# Patient Record
Sex: FEMALE
[De-identification: names, ages, dates, MRNs, and addresses within clinical notes are randomized per-mention and may not be internally consistent; named-entity substitution may affect disease eponyms.]

---

## 2017-02-23 ENCOUNTER — HOSPITAL ENCOUNTER (INPATIENT)
Dept: HOSPITAL 5 - ED | Age: 67
LOS: 2 days | Discharge: HOME | DRG: 312 | End: 2017-02-25
Attending: INTERNAL MEDICINE | Admitting: INTERNAL MEDICINE
Payer: COMMERCIAL

## 2017-02-23 DIAGNOSIS — E66.9: ICD-10-CM

## 2017-02-23 DIAGNOSIS — Z82.49: ICD-10-CM

## 2017-02-23 DIAGNOSIS — K52.9: ICD-10-CM

## 2017-02-23 DIAGNOSIS — J44.9: ICD-10-CM

## 2017-02-23 DIAGNOSIS — Z80.3: ICD-10-CM

## 2017-02-23 DIAGNOSIS — E78.5: ICD-10-CM

## 2017-02-23 DIAGNOSIS — I11.0: ICD-10-CM

## 2017-02-23 DIAGNOSIS — I50.9: ICD-10-CM

## 2017-02-23 DIAGNOSIS — Z87.891: ICD-10-CM

## 2017-02-23 DIAGNOSIS — R55: Primary | ICD-10-CM

## 2017-02-23 LAB
ALBUMIN SERPL-MCNC: 4.4 G/DL (ref 3.9–5)
ALBUMIN/GLOB SERPL: 1.4 %
ALP SERPL-CCNC: 151 UNITS/L (ref 35–129)
ALT SERPL-CCNC: 18 UNITS/L (ref 7–56)
ANION GAP SERPL CALC-SCNC: 18 MMOL/L
BACTERIA #/AREA URNS HPF: (no result) /HPF
BILIRUB SERPL-MCNC: 0.6 MG/DL (ref 0.1–1.2)
BILIRUB UR QL STRIP: (no result)
BLASTOCYTES % (MANUAL): 0 %
BLOOD UR QL VISUAL: (no result)
BUN SERPL-MCNC: 12 MG/DL (ref 7–17)
BUN/CREAT SERPL: 13.33 %
CALCIUM SERPL-MCNC: 8.9 MG/DL (ref 8.4–10.2)
CHLORIDE SERPL-SCNC: 97.1 MMOL/L (ref 98–107)
CHOLEST SERPL-MCNC: 224 MG/DL (ref 50–199)
CK SERPL-CCNC: 73 UNITS/L (ref 30–135)
CO2 SERPL-SCNC: 27 MMOL/L (ref 22–30)
GLUCOSE SERPL-MCNC: 147 MG/DL (ref 65–100)
HCT VFR BLD CALC: 45.7 % (ref 30.3–42.9)
HDLC SERPL-MCNC: 48 MG/DL (ref 40–59)
HGB BLD-MCNC: 15.2 GM/DL (ref 10.1–14.3)
INR PPP: 0.94 (ref 0.87–1.13)
KETONES UR STRIP-MCNC: (no result) MG/DL
LEUKOCYTE ESTERASE UR QL STRIP: (no result)
MAGNESIUM SERPL-MCNC: 3 MG/DL (ref 1.7–2.3)
MCH RBC QN AUTO: 29 PG (ref 28–32)
MCHC RBC AUTO-ENTMCNC: 33 % (ref 30–34)
MCV RBC AUTO: 86 FL (ref 79–97)
NITRITE UR QL STRIP: (no result)
PH UR STRIP: 7 [PH] (ref 5–7)
PLATELET # BLD: 169 K/MM3 (ref 140–440)
POTASSIUM SERPL-SCNC: 4 MMOL/L (ref 3.6–5)
PROT SERPL-MCNC: 7.5 G/DL (ref 6.3–8.2)
PROT UR STRIP-MCNC: (no result) MG/DL
RBC # BLD AUTO: 5.32 M/MM3 (ref 3.65–5.03)
RBC #/AREA URNS HPF: 1 /HPF (ref 0–6)
SODIUM SERPL-SCNC: 138 MMOL/L (ref 137–145)
TOTAL CELLS COUNTED PERCENT: 2
TRIGL SERPL-MCNC: 143 MG/DL (ref 2–149)
UROBILINOGEN UR-MCNC: < 2 MG/DL (ref ?–2)
WBC # BLD AUTO: 7.7 K/MM3 (ref 4.5–11)
WBC #/AREA URNS HPF: < 1 /HPF (ref 0–6)

## 2017-02-23 PROCEDURE — 84443 ASSAY THYROID STIM HORMONE: CPT

## 2017-02-23 PROCEDURE — 71010: CPT

## 2017-02-23 PROCEDURE — 70450 CT HEAD/BRAIN W/O DYE: CPT

## 2017-02-23 PROCEDURE — 93017 CV STRESS TEST TRACING ONLY: CPT

## 2017-02-23 PROCEDURE — 93306 TTE W/DOPPLER COMPLETE: CPT

## 2017-02-23 PROCEDURE — 80061 LIPID PANEL: CPT

## 2017-02-23 PROCEDURE — 80053 COMPREHEN METABOLIC PANEL: CPT

## 2017-02-23 PROCEDURE — A9502 TC99M TETROFOSMIN: HCPCS

## 2017-02-23 PROCEDURE — 36415 COLL VENOUS BLD VENIPUNCTURE: CPT

## 2017-02-23 PROCEDURE — 83880 ASSAY OF NATRIURETIC PEPTIDE: CPT

## 2017-02-23 PROCEDURE — 85007 BL SMEAR W/DIFF WBC COUNT: CPT

## 2017-02-23 PROCEDURE — 82550 ASSAY OF CK (CPK): CPT

## 2017-02-23 PROCEDURE — 83735 ASSAY OF MAGNESIUM: CPT

## 2017-02-23 PROCEDURE — 85379 FIBRIN DEGRADATION QUANT: CPT

## 2017-02-23 PROCEDURE — 71275 CT ANGIOGRAPHY CHEST: CPT

## 2017-02-23 PROCEDURE — 78452 HT MUSCLE IMAGE SPECT MULT: CPT

## 2017-02-23 PROCEDURE — 93970 EXTREMITY STUDY: CPT

## 2017-02-23 PROCEDURE — 85025 COMPLETE CBC W/AUTO DIFF WBC: CPT

## 2017-02-23 PROCEDURE — 93010 ELECTROCARDIOGRAM REPORT: CPT

## 2017-02-23 PROCEDURE — 84484 ASSAY OF TROPONIN QUANT: CPT

## 2017-02-23 PROCEDURE — 81001 URINALYSIS AUTO W/SCOPE: CPT

## 2017-02-23 PROCEDURE — 82553 CREATINE MB FRACTION: CPT

## 2017-02-23 PROCEDURE — 85610 PROTHROMBIN TIME: CPT

## 2017-02-23 PROCEDURE — 93005 ELECTROCARDIOGRAM TRACING: CPT

## 2017-02-23 PROCEDURE — 93880 EXTRACRANIAL BILAT STUDY: CPT

## 2017-02-23 RX ADMIN — FAMOTIDINE SCH MG: 20 TABLET ORAL at 21:11

## 2017-02-23 RX ADMIN — SODIUM CHLORIDE SCH MLS/HR: 0.9 INJECTION, SOLUTION INTRAVENOUS at 21:53

## 2017-02-23 RX ADMIN — ENOXAPARIN SODIUM SCH MG: 100 INJECTION SUBCUTANEOUS at 21:11

## 2017-02-23 NOTE — EMERGENCY DEPARTMENT REPORT
ED General Adult HPI





- General


Chief complaint: Dizziness


Stated complaint: DIZZY


Time Seen by Provider: 17 12:21


Source: patient, EMS (ems notes not available at time of  chart dictation), RN 

notes reviewed


Mode of arrival: Stretcher


Limitations: No Limitations, Physical Limitation





- History of Present Illness


Initial comments: 





This is a 66-year-old female.  She is previously unknown to me.  Her primary 

care doctor is Dr. Hale past medical history includes hypertension, thyroid 

disease, peripheral edema.





Patient presents to the ER with atraumatic left lower extremity pain and 

swelling.





Patient also complains of chest pain last week which since resolved, and 

episode of syncope today.  She also complains of dizziness.  Dizziness is 

described as a sensation of room spinning.  It is constant.





Patient reports that she did not fall or hit her head, she reports that 

somebody caught her.  There is no focal extremity weakness.  There is no focal 

extremity numbness.  Positive mild cough, mild shortness of breath, mild 

wheezing.  Symptoms have been constant.  Worse with physical exertion, they 

decrease with rest.


























-: Gradual


Location: chest, left, lower extremity


Severity scale (0 -10): 0


Quality: aching


Consistency: intermittent


Improves with: rest


Worsens with: movement


Associated Symptoms: chest pain, cough, loss of appetite, shortness of breath, 

syncope, weakness





- Related Data


 Home Medications











 Medication  Instructions  Recorded  Confirmed  Last Taken


 


Unobtainable  17 Unknown











 Allergies











Allergy/AdvReac Type Severity Reaction Status Date / Time


 


No Known Allergies Allergy   Verified 17 13:05














ED Review of Systems


ROS: 


Stated complaint: DIZZY


Other details as noted in HPI





Constitutional: malaise, weakness


Eyes: denies: vision change


ENT: denies: epistaxis


Respiratory: shortness of breath, wheezing


Cardiovascular: chest pain, syncope


Gastrointestinal: denies: abdominal pain


Genitourinary: as per HPI, dysuria


Skin: denies: lesions


Neurological: weakness


Psychiatric: anxiety





ED Past Medical Hx





- Past Medical History


Hx Hypertension: Yes


Hx Congestive Heart Failure: Yes


Hx COPD: Yes





- Surgical History


Past Surgical History?: No





- Social History


Smoking Status: Former Smoker


Substance Use Type: None





- Medications


Home Medications: 


 Home Medications











 Medication  Instructions  Recorded  Confirmed  Last Taken  Type


 


Unobtainable  17 Unknown History














ED Physical Exam





- General


Limitations: Physical Limitation


General appearance: alert, in distress





- Eye


Eye exam: Present: normal appearance, PERRL, EOMI.  Absent: nystagmus





- ENT


ENT exam: Present: normal exam, normal orophraynx, normal external ear exam





- Neck


Neck exam: Present: normal inspection, full ROM.  Absent: tenderness, 

meningismus





- Respiratory


Respiratory exam: Present: wheezes, rhonchi





- Cardiovascular


Cardiovascular Exam: Present: regular rate, normal rhythm, normal heart sounds.

  Absent: bradycardia, tachycardia, irregular rhythm, systolic murmur, 

diastolic murmur, rubs, gallop





- GI/Abdominal


GI/Abdominal exam: Present: soft, normal bowel sounds.  Absent: distended, 

tenderness, guarding, rebound, rigid, pulsatile mass





- Extremities Exam


Extremities exam: Present: full ROM, tenderness, normal capillary refill, pedal 

edema, calf tenderness





- Back Exam


Back exam: Present: normal inspection, full ROM.  Absent: tenderness, CVA 

tenderness (R), CVA tenderness (L), muscle spasm, paraspinal tenderness, 

vertebral tenderness





- Neurological Exam


Neurological exam: Present: alert, oriented X3, other (Extraocular movements 

intact.  Tongue midline.  No facial droop.  Facial sensation intact to light 

touch in the V1, V2, V3 distribution bilaterally.  5 and 5 strength in 4 

extremities..  Sensation is intact to light touch in 4 extremities.).  Absent: 

motor sensory deficit





- Psychiatric


Psychiatric exam: Present: anxious





ED Course


 Vital Signs











  17





  11:43 11:45 16:33


 


Temperature 98 F  


 


Pulse Rate 67  67


 


Respiratory 16 18 16





Rate   


 


Blood Pressure 156/82  139/89





[Left]   


 


O2 Sat by Pulse 95 95 97





Oximetry   














- Reevaluation(s)


Reevaluation #1: 





17 15:00








Differential diagnosis: DVT, pulmonary embolus, acute coronary syndrome, COPD 

exacerbation, pneumonia, peripheral vertigo, subacute stroke





Assessment and plan: 66-year-old female with left lower extremity pain and 

swelling, chest pain, syncope, peripheral vertigo.  Has a GCS of 15, NIH score 

is 0.  No obvious cranial nerve deficits.  Vertigo and symptoms have been 

present for greater than 4.5 hours, therefore not a TPA candidate.  Left lower 

extremity DVT study was negative, CT  of  the chest was negative.  Patient was 

wheezing, was given albuterol, Atrovent, steroids by EMS.  Patient will be 

admitted to the hospital for syncope, chest pain, shortness of breath.  


 case is discussed with the Hospital physician, Dr. Martínez, who accepts the 

patient to his service.





CT scan of the head is negative








17 17:18








ED Medical Decision Making





- Lab Data


Result diagrams: 


 17 12:50





 17 12:50








 Vital Signs











  17





  11:43 11:45


 


Temperature 98 F 


 


Pulse Rate 67 


 


Respiratory 16 18





Rate  


 


Blood Pressure 156/82 





[Left]  


 


O2 Sat by Pulse 95 95





Oximetry  














 Lab Results











  17 Range/Units





  12:50 12:50 12:50 


 


WBC  7.7    (4.5-11.0)  K/mm3


 


RBC  5.32 H    (3.65-5.03)  M/mm3


 


Hgb  15.2 H    (10.1-14.3)  gm/dl


 


Hct  45.7 H    (30.3-42.9)  %


 


MCV  86    (79-97)  fl


 


MCH  29    (28-32)  pg


 


MCHC  33    (30-34)  %


 


RDW  13.9    (13.2-15.2)  %


 


Plt Count  169    (140-440)  K/mm3


 


Add Manual Diff  Complete    


 


Total Counted  100    


 


Seg Neutrophils %  Np    


 


Seg Neuts % (Manual)  87.0 H    (40.0-70.0)  %


 


Band Neutrophils %  8.0    %


 


Lymphocytes % (Manual)  3.0 L    (13.4-35.0)  %


 


Reactive Lymphs % (Man)  0    %


 


Monocytes % (Manual)  1.0    (0.0-7.3)  %


 


Eosinophils % (Manual)  1.0    (0.0-4.3)  %


 


Basophils % (Manual)  0    (0.0-1.8)  %


 


Metamyelocytes %  0    %


 


Myelocytes %  0    %


 


Promyelocytes %  0    %


 


Blast Cells %  0    %


 


Nucleated RBC %  Not Reportable    


 


Seg Neutrophils # Man  6.7    (1.8-7.7)  K/mm3


 


Band Neutrophils #  0.6    K/mm3


 


Lymphocytes # (Manual)  0.2 L    (1.2-5.4)  K/mm3


 


Abs React Lymphs (Man)  0.0    K/mm3


 


Monocytes # (Manual)  0.1    (0.0-0.8)  K/mm3


 


Eosinophils # (Manual)  0.1    (0.0-0.4)  K/mm3


 


Basophils # (Manual)  0.0    (0.0-0.1)  K/mm3


 


Metamyelocytes #  0.0    K/mm3


 


Myelocytes #  0.0    K/mm3


 


Promyelocytes #  0.0    K/mm3


 


Blast Cells #  0.0    K/mm3


 


WBC Morphology  Not Reportable    


 


Hypersegmented Neuts  Not Reportable    


 


Hyposegmented Neuts  Not Reportable    


 


Hypogranular Neuts  Not Reportable    


 


Smudge Cells  Not Reportable    


 


Toxic Granulation  Not Reportable    


 


Toxic Vacuolation  Not Reportable    


 


Dohle Bodies  Not Reportable    


 


Pelger-Huet Anomaly  Not Reportable    


 


Chanelle Rods  Not Reportable    


 


Platelet Estimate  Appears decreased    


 


Clumped Platelets  Not Reportable    


 


Plt Clumps, EDTA  Not Reportable    


 


Large Platelets  Not Reportable    


 


Giant Platelets  Not Reportable    


 


Platelet Satelliting  Not Reportable    


 


Plt Morphology Comment  Not Reportable    


 


RBC Morphology  Normal    


 


Dimorphic RBCs  Not Reportable    


 


Polychromasia  Not Reportable    


 


Hypochromasia  Not Reportable    


 


Poikilocytosis  Not Reportable    


 


Anisocytosis  Not Reportable    


 


Microcytosis  Not Reportable    


 


Macrocytosis  Not Reportable    


 


Spherocytes  Not Reportable    


 


Pappenheimer Bodies  Not Reportable    


 


Sickle Cells  Not Reportable    


 


Target Cells  Not Reportable    


 


Tear Drop Cells  Not Reportable    


 


Ovalocytes  Not Reportable    


 


Helmet Cells  Not Reportable    


 


[place] Bodies  Not Reportable    


 


Cabot Rings  Not Reportable    


 


[place] Cells  Not Reportable    


 


Bite Cells  Not Reportable    


 


Crenated Cell  Not Reportable    


 


Elliptocytes  Not Reportable    


 


Acanthocytes (Spur)  Not Reportable    


 


Rouleaux  Not Reportable    


 


Hemoglobin C Crystals  Not Reportable    


 


Schistocytes  Not Reportable    


 


Malaria parasites  Not Reportable    


 


Charly Bodies  Not Reportable    


 


Hem Pathologist Commnt  No    


 


PT   12.5   (12.2-14.9)  Sec.


 


INR   0.94   (0.87-1.13)  


 


D-Dimer   282.05 H   (0-234)  ng/mlDDU


 


Sodium    138  (137-145)  mmol/L


 


Potassium    4.0  (3.6-5.0)  mmol/L


 


Chloride    97.1 L  ()  mmol/L


 


Carbon Dioxide    27  (22-30)  mmol/L


 


Anion Gap    18  mmol/L


 


BUN    12  (7-17)  mg/dL


 


Creatinine    0.9  (0.7-1.2)  mg/dL


 


Estimated GFR    > 60  ml/min


 


BUN/Creatinine Ratio    13.33  %


 


Glucose    147 H  ()  mg/dL


 


Calcium    8.9  (8.4-10.2)  mg/dL


 


Magnesium    3.0 H  (1.7-2.3)  mg/dL


 


Total Bilirubin    0.6  (0.1-1.2)  mg/dL


 


AST    16  (5-40)  units/L


 


ALT    18  (7-56)  units/L


 


Alkaline Phosphatase    151 H  ()  units/L


 


Troponin T    < 0.010  (0.00-0.029)  ng/mL


 


NT-Pro-B Natriuret Pep     (0-900)  pg/mL


 


Total Protein    7.5  (6.3-8.2)  g/dL


 


Albumin    4.4  (3.9-5)  g/dL


 


Albumin/Globulin Ratio    1.4  %


 


TSH     (0.270-4.200)  mlU/mL


 


Urine Color     (Yellow)  


 


Urine Turbidity     (Clear)  


 


Urine pH     (5.0-7.0)  


 


Ur Specific Gravity     (1.003-1.030)  


 


Urine Protein     (Negative)  mg/dL


 


Urine Glucose (UA)     (Negative)  mg/dL


 


Urine Ketones     (Negative)  mg/dL


 


Urine Blood     (Negative)  


 


Urine Nitrite     (Negative)  


 


Urine Bilirubin     (Negative)  


 


Urine Urobilinogen     (<2.0)  mg/dL


 


Ur Leukocyte Esterase     (Negative)  


 


Urine WBC (Auto)     (0.0-6.0)  /HPF


 


Urine RBC (Auto)     (0.0-6.0)  /HPF


 


U Epithel Cells (Auto)     (0-13.0)  /HPF


 


Urine Bacteria (Auto)     (Negative)  /HPF














  17 Range/Units





  12:50 12:50 13:11 


 


WBC     (4.5-11.0)  K/mm3


 


RBC     (3.65-5.03)  M/mm3


 


Hgb     (10.1-14.3)  gm/dl


 


Hct     (30.3-42.9)  %


 


MCV     (79-97)  fl


 


MCH     (28-32)  pg


 


MCHC     (30-34)  %


 


RDW     (13.2-15.2)  %


 


Plt Count     (140-440)  K/mm3


 


Add Manual Diff     


 


Total Counted     


 


Seg Neutrophils %     


 


Seg Neuts % (Manual)     (40.0-70.0)  %


 


Band Neutrophils %     %


 


Lymphocytes % (Manual)     (13.4-35.0)  %


 


Reactive Lymphs % (Man)     %


 


Monocytes % (Manual)     (0.0-7.3)  %


 


Eosinophils % (Manual)     (0.0-4.3)  %


 


Basophils % (Manual)     (0.0-1.8)  %


 


Metamyelocytes %     %


 


Myelocytes %     %


 


Promyelocytes %     %


 


Blast Cells %     %


 


Nucleated RBC %     


 


Seg Neutrophils # Man     (1.8-7.7)  K/mm3


 


Band Neutrophils #     K/mm3


 


Lymphocytes # (Manual)     (1.2-5.4)  K/mm3


 


Abs React Lymphs (Man)     K/mm3


 


Monocytes # (Manual)     (0.0-0.8)  K/mm3


 


Eosinophils # (Manual)     (0.0-0.4)  K/mm3


 


Basophils # (Manual)     (0.0-0.1)  K/mm3


 


Metamyelocytes #     K/mm3


 


Myelocytes #     K/mm3


 


Promyelocytes #     K/mm3


 


Blast Cells #     K/mm3


 


WBC Morphology     


 


Hypersegmented Neuts     


 


Hyposegmented Neuts     


 


Hypogranular Neuts     


 


Smudge Cells     


 


Toxic Granulation     


 


Toxic Vacuolation     


 


Dohle Bodies     


 


Pelger-Huet Anomaly     


 


Chanelle Rods     


 


Platelet Estimate     


 


Clumped Platelets     


 


Plt Clumps, EDTA     


 


Large Platelets     


 


Giant Platelets     


 


Platelet Satelliting     


 


Plt Morphology Comment     


 


RBC Morphology     


 


Dimorphic RBCs     


 


Polychromasia     


 


Hypochromasia     


 


Poikilocytosis     


 


Anisocytosis     


 


Microcytosis     


 


Macrocytosis     


 


Spherocytes     


 


Pappenheimer Bodies     


 


Sickle Cells     


 


Target Cells     


 


Tear Drop Cells     


 


Ovalocytes     


 


Helmet Cells     


 


Graves-[place]ac [place] Bodies     


 


Cabot Rings     


 


[place] Cells     


 


Bite Cells     


 


Crenated Cell     


 


Elliptocytes     


 


Acanthocytes (Spur)     


 


Rouleaux     


 


Hemoglobin C Crystals     


 


Schistocytes     


 


Malaria parasites     


 


Charly Bodies     


 


Hem Pathologist Commnt     


 


PT     (12.2-14.9)  Sec.


 


INR     (0.87-1.13)  


 


D-Dimer     (0-234)  ng/mlDDU


 


Sodium     (137-145)  mmol/L


 


Potassium     (3.6-5.0)  mmol/L


 


Chloride     ()  mmol/L


 


Carbon Dioxide     (22-30)  mmol/L


 


Anion Gap     mmol/L


 


BUN     (7-17)  mg/dL


 


Creatinine     (0.7-1.2)  mg/dL


 


Estimated GFR     ml/min


 


BUN/Creatinine Ratio     %


 


Glucose     ()  mg/dL


 


Calcium     (8.4-10.2)  mg/dL


 


Magnesium     (1.7-2.3)  mg/dL


 


Total Bilirubin     (0.1-1.2)  mg/dL


 


AST     (5-40)  units/L


 


ALT     (7-56)  units/L


 


Alkaline Phosphatase     ()  units/L


 


Troponin T     (0.00-0.029)  ng/mL


 


NT-Pro-B Natriuret Pep  422.9    (0-900)  pg/mL


 


Total Protein     (6.3-8.2)  g/dL


 


Albumin     (3.9-5)  g/dL


 


Albumin/Globulin Ratio     %


 


TSH   2.310   (0.270-4.200)  mlU/mL


 


Urine Color    Straw  (Yellow)  


 


Urine Turbidity    Clear  (Clear)  


 


Urine pH    7.0  (5.0-7.0)  


 


Ur Specific Gravity    1.006  (1.003-1.030)  


 


Urine Protein    <15 mg/dl  (Negative)  mg/dL


 


Urine Glucose (UA)    Neg  (Negative)  mg/dL


 


Urine Ketones    Neg  (Negative)  mg/dL


 


Urine Blood    Neg  (Negative)  


 


Urine Nitrite    Neg  (Negative)  


 


Urine Bilirubin    Neg  (Negative)  


 


Urine Urobilinogen    < 2.0  (<2.0)  mg/dL


 


Ur Leukocyte Esterase    Neg  (Negative)  


 


Urine WBC (Auto)    < 1.0  (0.0-6.0)  /HPF


 


Urine RBC (Auto)    1.0  (0.0-6.0)  /HPF


 


U Epithel Cells (Auto)    < 1.0  (0-13.0)  /HPF


 


Urine Bacteria (Auto)    1+  (Negative)  /HPF

















- EKG Data


When compared to previous EKG there are: previous EKG unavailable





17 15:01 normal sinus, 79 bpm, left axis deviation, premature ventricular 

contractions, low voltage, abnormal EKG, not morphologically consistent with 

STEMI.




















- Radiology Data


Radiology results: report reviewed, image reviewed








   [facility]   [facility] : 1950  MedRec# [id number]





17 13:38 - Radiology Dept. Note by ES BEASLEY


   [facility] Num: [id number]  : 1950  Patient Age: 66





VASCULAR LAB.PRELIMINARY REPORT.BLE VENOUS DUPLEX DONE.NO EVIDENCE OF DVT/SVT 

IN VESSELS VISUALIZED.





Initialized on 17 13:38 - END OF NOTE














CT angiogram of the chest negative for acute disease.





Chest x-ray negative, chronic findings noted.








Critical care attestation.: 


If time is entered above; I have spent that time in minutes in the direct care 

of this critically ill patient, excluding procedure time.








ED Disposition


Clinical Impression: 


 Syncope, Chest pain, Dizziness





Disposition: OP ADMITTED AS IP TO THIS HOSP


Is pt being admited?: Yes


Condition: Good

## 2017-02-23 NOTE — CAT SCAN REPORT
CT HEAD WITHOUT CONTRAST:



HISTORY:  Syncope.



Serial contiguous axial images were obtained through the cranium.

Intravenous contrast material was not administered.  The ventricles are 

normal in size and appearance.  There is no mass effect or midline 

shift.  No areas of abnormally increased or decreased attenuation are 

seen.  Minimal nonspecific chronic white matter changes are noted in 

both frontal lobes. No mass lesion is seen.



The mastoid air cells and visualized portions of the sinuses are

normal.



IMPRESSION:

Cranial CT scan within normal limits.

## 2017-02-23 NOTE — ADMIT CRITERIA FORM
Admission Criteria Documentation: 





                                                CHEST PAIN





Clinical Indications for Admission to Inpatient Care





                                                                         (Place 

'X' for any and all applicable criteria): 





Admission is indicated for chest pain and ANY ONE of the following(1)(2)(3)(4)(5

): 





[ ]I.    Angina with acute coronary syndrome (Also use Myocardial Infarction or 

Angina guideline)


[ ]II.   Hemodynamic instability


[ ]III.  Angina needing acute intervention as indicated by ALL of the following(

11)(12):


        [ ]a)  Unstable angina is present as indicated by angina that is ANY ONE

 of the following:


                [ ]i)     New onset   


                [ ]ii)    Nocturnal   


                [ ]iii)   Prolonged at rest   


                [ ]iv)   Progressive


        [ ]b)  Angina warrants acute intervention as indicated by ANY ONE of 

the following:


                [ ]i)     Recurrent angina (e.g, not responding as previously 

to treatment)


                [ ]ii)     Angina at rest or with low-level activities despite 

initial medical therapy


                [ ]iii)    New or presumably new ST-segment depression on ECG


                [ ]iv)    Signs or symptoms of heart failure (eg, dyspnea, 

pulmonary edema)


                [ ]v)     New or worsening mitral regurgitation


                [ ]vi)    Hemodynamic instability


                [ ]vii)   Dangerous arrhythmia (eg, sustained ventricular 

tachycardia)          


                [ ]viii)   History of percutaneous coronary intervention within 

6 months          


                [ ]ix)    History of coronary artery bypass graft surgery


                [ ]x)    PANCHITO risk score of 2 or greater[A]


                [ ]xi)    History of Diabetes(14)


                [ ]xii)   High-risk cardiac ischemia findings on noninvasive 

testing (e.g, echocardiogram,


                          treadmill testing, nuclear scan)


                [ ]xiii)  Chronic renal insufficiency (ie, estimated GFR less 

than 60 mL/min/1.732m)


                [ ]xiv)  Left ventricular ejection fraction less than 40%


              


[ ]IV.   Evidence of MI (eg, cardiac biomarkers positive, ST-segment elevation 

on ECG) also use Myocardial Infarction Criteria Form.


[ ]V.    Pulmonary edema 


[ ]VI.   Respiratory distress


[ ]VII.  Chest pain indicative of serious diagnosis other than coronary artery 

disease (eg, aortic dissection)





[ ]VIII. Contraindications and/or Inappropriate clinical situations for 

Observational Care in patients


          with Chest Pain, when ANY ONE of the following is required:


          [ ]a)   Patient with risk factor for pulmonary embolism, acute 

coronary syndrome and myocardial infarction (18)


          [ ]b)   Patient with Pulmonary embolism require an average LOS of 4.3 

days, therefore emergency 


                   department observation management is inappropriate 18,23


          [ ]c)   Painful condition/s in the elderly, have the highest rate of 

recidivism after emergency department observation management (10.8%)  20,21,22


          [ ]d)   Elevated cardiac biomarker requires intensive and exhaustive 

care (19)


[X ]IX.  General contraindications and/or Inappropriate clinical situations for 

Observational Care in patients


          with Chest Pain, when ANY ONE of the following is required:


           [X ]a)   Prediction of prolongation of LOS based on ANY ONE of the 

following may be considered as 


                    a contraindication for observational care 2, 3, 4, 5, 6, 7, 

8, 9, 10, 11


                    [ ]i)    Age > 65 yrs.


                    [X ]ii)   Patient arriving by ambulance


                    [ ]iii)  Patient with high acuity


                    [ ]iv)  Patient requiring vital sign monitoring


                    [ ]v)   Patient on IV medication


           [ ]b)   Systolic blood pressures  180mmHg  3,12


           [ ]c)   Patient with altered mental status including delirium and 

other alteration of consciousness, (3)


           [ ]d)   Patient whose discharge disposition will be to a skilled 

nursing home or rehabilitation home should 


                    not be managed in Emergency Department Observation Unit. 

CMS rule requires 3 days hospital stay before such placement. 3,13


           [ ]e)   Patient with failure to thrive due to broad array of 

etiologies  3,16,17


           [ ]f)    Inability to ambulate  3,14








Extended stay beyond goal length of stay may be needed for (1)(28):


[ ]a)   Specific condition diagnosed after evaluation (eg, pulmonary embolism, 

aortic dissection) 


[ ]b)   Unstable angina


[ ]c)   Continued suspicion of acute coronary syndrome with inability to 

complete needed cardiac evaluation


         (eg, patient clinically unable to undergo stress testing)


[ ]d)   Myocardial infarction








(Contents from ANGINA and CHEST PAIN clinical indications for admission to 

inpatient care have been integrated in this form) 








The original [facility] content created by [facility] has been revised. 


The portions of the content which have been revised are identified through the 

use of italic text or in bold, and [facility] [employer]


has neither reviewed nor approved the modified material. All other unmodified 

content is copyright  [facility].





Please see references footnoted in the original [facility] edition 

2016





Admission Criteria Met: Yes

## 2017-02-23 NOTE — CAT SCAN REPORT
CT angiography of the chest with 3-D reconstructed images.



Findings: There is no evidence of pulmonary emboli. The lungs are 

clear. Mild to moderate emphysematous changes are noted especially in 

the upper lobes. An isolated bleb is seen in the right upper lobe 

measuring 1.9 cm in diameter. There is no pleural fluid.



The mediastinum and hilar structures are normal.



Impression: No evidence of pulmonary emboli. COPD is present.

## 2017-02-23 NOTE — XRAY REPORT
AP CHEST:



HISTORY: Syncope



AP view of the chest demonstrates a normal mediastinal and

cardiac contour with clear lungs and normal bony and soft tissue

structures.



IMPRESSION:

Unremarkable AP chest.

## 2017-02-23 NOTE — VASCULAR LAB REPORT
LOWER EXTREMITY VENOUS DUPLEX:



REASON FOR EXAM: Pain and swelling of the lower extremities.



COMMENTS ON THE RIGHT:

All veins visualized are freely compressible without evidence of

internal echogenicity.  Flow is spontaneous and phasic throughout.



COMMENTS ON THE LEFT:

All veins visualized are freely compressible without evidence of

internal echogenicity.  Flow is spontaneous and phasic throughout.



IMPRESSION:

No evidence of acute or chronic deep venous thrombosis in either

lower extremity.

## 2017-02-23 NOTE — HISTORY AND PHYSICAL REPORT
History of Present Illness


Date of examination: 02/23/17


Date of admission: 


02/23/17 15:02





Chief complaint: 





Syncope


History of present illness: 





This is a  67 y/o  female with h/o COPD, HTN  presented with an episode of 

syncope this morning. She also c/o diarrhea for last couple days. patient 

states that she has been feeling lightheaded for last couple weeks when she 

gets up from sitting and walks aaround, symptom was off and on. today ricky 

were having furniture delivery to their house and she was standing near the 

door when she felt lightheaded, passed out, slided along the wall and fell on 

the floor. She denies any head injury. She states that lasy couple days she was 

having loose stool, without ant rectal bleeding. She c/o nausea but no 

vomiting. She also reported in the ER that she had left leg pain and swelling. 

CT head in the ER was unremarkable. She also also evaluated by doppler US of 

the LLE and CT with PE protocol was normal. She is getting admitted for further 

evaluation of syncope.





Past medical History: h/o COPD, HTN





Past surgical History: None





Social History: Lives with family, denies any smoking, drinking and elicit drug 

abuse. Quit smoking on 2012.





Family History: Significant for HD in parents, and breast cancer in mother.





Review of System:


Constitutional: no fever, no chills, no weight loss, + lightheadedness


Ears, eyes, nose, mouth and throat: no nasal congestion, no nasal discharge, no 

sinus pressure, no vision change, no red eye. + left earache


Neck: No neck pain or rigidity.


Cardiovascular: No chest pain, no orthopnea, no palpitations, no leg swelling


Respiratory: No shortness of breath, no cough, no congestion, no wheezing


Gastrointestinal: no abdominal pain, + nausea, no vomiting, + diarrhea


Genitourinary : no dysuria, no hematuria


Musculoskeletal: no joint swelling or muscle ache 


Integumentary: no rash, no pruritis


Neurological: no parathesias, no numbness, no tingling


Endocrine: no cold or heat intolerance, no polyuria or polydipsia


Hematologic/Lymphatic: no easy bruising, no easy bleeding, no gland swelling


Allergic/Immunologic: no urticaria, no angioedema.





Medications and Allergies


 Allergies











Allergy/AdvReac Type Severity Reaction Status Date / Time


 


No Known Allergies Allergy   Verified 02/23/17 13:05











 Home Medications











 Medication  Instructions  Recorded  Confirmed  Last Taken  Type


 


ALBUTEROL Inhaler [ProAir HFA 2 puff IH QID PRN 02/23/17 02/23/17 02/23/17 

History





Inhaler]     


 


Atenolol [Tenormin] 100 mg PO DAILY 02/23/17 02/23/17 02/23/17 History


 


Furosemide [Lasix TAB] 40 mg PO QDAY 02/23/17 02/23/17 02/23/17 History


 


Lisinopril [Zestril TAB] 40 mg PO QDAY 02/23/17 02/23/17 02/23/17 History


 


Omeprazole 20 mg PO DAILY 02/23/17 02/23/17 02/23/17 History


 


Potassium Chloride [K-Dur] 10 meq PO QDAY 02/23/17 02/23/17 02/23/17 History


 


Umeclidinium Brm/Vilanterol Tr 1 puff PO DAILY 02/23/17 02/23/17 02/23/17 

History





[Anoro Ellipta 62.5-25 Mcg INH]     


 


amLODIPine [Norvasc] 10 mg PO DAILY 02/23/17 02/23/17 02/23/17 History














Exam





- Physical Exam


Narrative exam: 





GENERAL: This is well-developed obese WF lying on bed appeared to be in no 

discomfort. 


HEENT: Normocephalic.  Atraumatic.  Extraocular motions are intact. No 

conjunctival congestion or icterus. Patient has moist mucous membranes. 

External auditory canal and nares patent bilaterally.


NECK: Supple.  Trachea midline. No JVD, thyromagaly or lymphadenopathy.


CHEST/LUNGS: Clear to auscultated bilaterally.  There is no respiratory 

distress noted, breathing nonlabored. No wheezes crackles or rhonchi.


HEART/CARDIOVASCULAR: Regular in rate and rhythm.  PMI at the apex.  There is 

no gallop rub or murmur.


ABDOMEN: Abdomen is soft, nontender.  Patient has normal bowel sounds.  There 

is no abdominal distention. No organomagaly or rigidity.


SKIN: There is no rash,  no erythrema.  There is no diaphoresis. Warm and dry.


NEUROLOGY: The patient is awake, alert, and oriented.  The patient is 

cooperative.   The patient has normal speech. No focal motor deficit.


MUSCULOSKELETAL: No joint effusion or tenderness. Muscle strength equal 

bilaterally. No muscle wasting. 


EXTRIMITY: No edema, cyanosis or clubbing.


PSYCH: No depression or anxiety noted. Cooperative.





- Constitutional


Vitals: 


 











Temp Pulse Resp BP Pulse Ox


 


 98 F   67   16   139/89   97 


 


 02/23/17 11:43  02/23/17 16:33  02/23/17 16:33  02/23/17 16:33  02/23/17 16:33














Results





- Labs


CBC & Chem 7: 


 02/23/17 12:50





 02/23/17 12:50


Labs: 


 Laboratory Last Values











WBC  7.7 K/mm3 (4.5-11.0)   02/23/17  12:50    


 


RBC  5.32 M/mm3 (3.65-5.03)  H  02/23/17  12:50    


 


Hgb  15.2 gm/dl (10.1-14.3)  H  02/23/17  12:50    


 


Hct  45.7 % (30.3-42.9)  H  02/23/17  12:50    


 


MCV  86 fl (79-97)   02/23/17  12:50    


 


MCH  29 pg (28-32)   02/23/17  12:50    


 


MCHC  33 % (30-34)   02/23/17  12:50    


 


RDW  13.9 % (13.2-15.2)   02/23/17  12:50    


 


Plt Count  169 K/mm3 (140-440)   02/23/17  12:50    


 


Add Manual Diff  Complete   02/23/17  12:50    


 


Total Counted  100   02/23/17  12:50    


 


Seg Neutrophils %  Np   02/23/17  12:50    


 


Seg Neuts % (Manual)  87.0 % (40.0-70.0)  H  02/23/17  12:50    


 


Band Neutrophils %  8.0 %  02/23/17  12:50    


 


Lymphocytes % (Manual)  3.0 % (13.4-35.0)  L  02/23/17  12:50    


 


Reactive Lymphs % (Man)  0 %  02/23/17  12:50    


 


Monocytes % (Manual)  1.0 % (0.0-7.3)   02/23/17  12:50    


 


Eosinophils % (Manual)  1.0 % (0.0-4.3)   02/23/17  12:50    


 


Basophils % (Manual)  0 % (0.0-1.8)   02/23/17  12:50    


 


Metamyelocytes %  0 %  02/23/17  12:50    


 


Myelocytes %  0 %  02/23/17  12:50    


 


Promyelocytes %  0 %  02/23/17  12:50    


 


Blast Cells %  0 %  02/23/17  12:50    


 


Nucleated RBC %  Not Reportable   02/23/17  12:50    


 


Seg Neutrophils # Man  6.7 K/mm3 (1.8-7.7)   02/23/17  12:50    


 


Band Neutrophils #  0.6 K/mm3  02/23/17  12:50    


 


Lymphocytes # (Manual)  0.2 K/mm3 (1.2-5.4)  L  02/23/17  12:50    


 


Abs React Lymphs (Man)  0.0 K/mm3  02/23/17  12:50    


 


Monocytes # (Manual)  0.1 K/mm3 (0.0-0.8)   02/23/17  12:50    


 


Eosinophils # (Manual)  0.1 K/mm3 (0.0-0.4)   02/23/17  12:50    


 


Basophils # (Manual)  0.0 K/mm3 (0.0-0.1)   02/23/17  12:50    


 


Metamyelocytes #  0.0 K/mm3  02/23/17  12:50    


 


Myelocytes #  0.0 K/mm3  02/23/17  12:50    


 


Promyelocytes #  0.0 K/mm3  02/23/17  12:50    


 


Blast Cells #  0.0 K/mm3  02/23/17  12:50    


 


WBC Morphology  Not Reportable   02/23/17  12:50    


 


Hypersegmented Neuts  Not Reportable   02/23/17  12:50    


 


Hyposegmented Neuts  Not Reportable   02/23/17  12:50    


 


Hypogranular Neuts  Not Reportable   02/23/17  12:50    


 


Smudge Cells  Not Reportable   02/23/17  12:50    


 


Toxic Granulation  Not Reportable   02/23/17  12:50    


 


Toxic Vacuolation  Not Reportable   02/23/17  12:50    


 


Dohle Bodies  Not Reportable   02/23/17  12:50    


 


Pelger-Huet Anomaly  Not Reportable   02/23/17  12:50    


 


Chanelle Rods  Not Reportable   02/23/17  12:50    


 


Platelet Estimate  Appears decreased   02/23/17  12:50    


 


Clumped Platelets  Not Reportable   02/23/17  12:50    


 


Plt Clumps, EDTA  Not Reportable   02/23/17  12:50    


 


Large Platelets  Not Reportable   02/23/17  12:50    


 


Giant Platelets  Not Reportable   02/23/17  12:50    


 


Platelet Satelliting  Not Reportable   02/23/17  12:50    


 


Plt Morphology Comment  Not Reportable   02/23/17  12:50    


 


RBC Morphology  Normal   02/23/17  12:50    


 


Dimorphic RBCs  Not Reportable   02/23/17  12:50    


 


Polychromasia  Not Reportable   02/23/17  12:50    


 


Hypochromasia  Not Reportable   02/23/17  12:50    


 


Poikilocytosis  Not Reportable   02/23/17  12:50    


 


Anisocytosis  Not Reportable   02/23/17  12:50    


 


Microcytosis  Not Reportable   02/23/17  12:50    


 


Macrocytosis  Not Reportable   02/23/17  12:50    


 


Spherocytes  Not Reportable   02/23/17  12:50    


 


Pappenheimer Bodies  Not Reportable   02/23/17  12:50    


 


Sickle Cells  Not Reportable   02/23/17  12:50    


 


Target Cells  Not Reportable   02/23/17  12:50    


 


Tear Drop Cells  Not Reportable   02/23/17  12:50    


 


Ovalocytes  Not Reportable   02/23/17  12:50    


 


Helmet Cells  Not Reportable   02/23/17  12:50    


 


[place]ves-[place] Bodies  Not Reportable   02/23/17  12:50    


 


Cabot Rings  Not Reportable   02/23/17  12:50    


 


Brad Cells  Not Reportable   02/23/17  12:50    


 


Bite Cells  Not Reportable   02/23/17  12:50    


 


Crenated Cell  Not Reportable   02/23/17  12:50    


 


Elliptocytes  Not Reportable   02/23/17  12:50    


 


Acanthocytes (Spur)  Not Reportable   02/23/17  12:50    


 


Rouleaux  Not Reportable   02/23/17  12:50    


 


Hemoglobin C Crystals  Not Reportable   02/23/17  12:50    


 


Schistocytes  Not Reportable   02/23/17  12:50    


 


Malaria parasites  Not Reportable   02/23/17  12:50    


 


Charly Bodies  Not Reportable   02/23/17  12:50    


 


Hem Pathologist Commnt  No   02/23/17  12:50    


 


PT  12.5 Sec. (12.2-14.9)   02/23/17  12:50    


 


INR  0.94  (0.87-1.13)   02/23/17  12:50    


 


D-Dimer  282.05 ng/mlDDU (0-234)  H  02/23/17  12:50    


 


Sodium  138 mmol/L (137-145)   02/23/17  12:50    


 


Potassium  4.0 mmol/L (3.6-5.0)   02/23/17  12:50    


 


Chloride  97.1 mmol/L ()  L  02/23/17  12:50    


 


Carbon Dioxide  27 mmol/L (22-30)   02/23/17  12:50    


 


Anion Gap  18 mmol/L  02/23/17  12:50    


 


BUN  12 mg/dL (7-17)   02/23/17  12:50    


 


Creatinine  0.9 mg/dL (0.7-1.2)   02/23/17  12:50    


 


Estimated GFR  > 60 ml/min  02/23/17  12:50    


 


BUN/Creatinine Ratio  13.33 %  02/23/17  12:50    


 


Glucose  147 mg/dL ()  H  02/23/17  12:50    


 


Calcium  8.9 mg/dL (8.4-10.2)   02/23/17  12:50    


 


Magnesium  3.0 mg/dL (1.7-2.3)  H  02/23/17  12:50    


 


Total Bilirubin  0.6 mg/dL (0.1-1.2)   02/23/17  12:50    


 


AST  16 units/L (5-40)   02/23/17  12:50    


 


ALT  18 units/L (7-56)   02/23/17  12:50    


 


Alkaline Phosphatase  151 units/L ()  H  02/23/17  12:50    


 


Troponin T  < 0.010 ng/mL (0.00-0.029)   02/23/17  12:50    


 


NT-Pro-B Natriuret Pep  422.9 pg/mL (0-900)   02/23/17  12:50    


 


Total Protein  7.5 g/dL (6.3-8.2)   02/23/17  12:50    


 


Albumin  4.4 g/dL (3.9-5)   02/23/17  12:50    


 


Albumin/Globulin Ratio  1.4 %  02/23/17  12:50    


 


TSH  2.310 mlU/mL (0.270-4.200)   02/23/17  12:50    


 


Urine Color  Straw  (Yellow)   02/23/17  13:11    


 


Urine Turbidity  Clear  (Clear)   02/23/17  13:11    


 


Urine pH  7.0  (5.0-7.0)   02/23/17  13:11    


 


Ur Specific Gravity  1.006  (1.003-1.030)   02/23/17  13:11    


 


Urine Protein  <15 mg/dl mg/dL (Negative)   02/23/17  13:11    


 


Urine Glucose (UA)  Neg mg/dL (Negative)   02/23/17  13:11    


 


Urine Ketones  Neg mg/dL (Negative)   02/23/17  13:11    


 


Urine Blood  Neg  (Negative)   02/23/17  13:11    


 


Urine Nitrite  Neg  (Negative)   02/23/17  13:11    


 


Urine Bilirubin  Neg  (Negative)   02/23/17  13:11    


 


Urine Urobilinogen  < 2.0 mg/dL (<2.0)   02/23/17  13:11    


 


Ur Leukocyte Esterase  Neg  (Negative)   02/23/17  13:11    


 


Urine WBC (Auto)  < 1.0 /HPF (0.0-6.0)   02/23/17  13:11    


 


Urine RBC (Auto)  1.0 /HPF (0.0-6.0)   02/23/17  13:11    


 


U Epithel Cells (Auto)  < 1.0 /HPF (0-13.0)   02/23/17  13:11    


 


Urine Bacteria (Auto)  1+ /HPF (Negative)   02/23/17  13:11    














- Imaging and Cardiology


CT scan - chest: report reviewed (negative for PE)


CT Scan - head: report reviewed (no acute intracranial process)


Venous US: report reviewed (lower extremity venous Doppler was negative for DVT)





Assessment and Plan


Assessment and plan: 





Syncope, likely vasovagal


H/o COPD not in exacerbation


HTN, benign essential


Obesity due to access calorie


Acute diarrhea, likely gastroenteritis








Plan:


Patient will be admitted to medicine


Monitor serial cardiac enzyme and EKG


IV fluid, stool study


Obtain 2-D echocardiogram and carotid Doppler


Cardiology consult


Supportive care, hold BP meds, orthostatic vitals


Provide GI and dvt prophylaxis











Advance Directives: Yes


VTE prophylaxis?: Chemical


Plan of care discussed with patient/family: Yes

## 2017-02-24 LAB — CK SERPL-CCNC: 82 UNITS/L (ref 30–135)

## 2017-02-24 RX ADMIN — SODIUM CHLORIDE SCH MLS/HR: 0.9 INJECTION, SOLUTION INTRAVENOUS at 10:47

## 2017-02-24 RX ADMIN — FAMOTIDINE SCH MG: 20 TABLET ORAL at 10:47

## 2017-02-24 RX ADMIN — ASPIRIN SCH MG: 325 TABLET, COATED ORAL at 10:47

## 2017-02-24 RX ADMIN — FAMOTIDINE SCH MG: 20 TABLET ORAL at 21:34

## 2017-02-24 RX ADMIN — SODIUM CHLORIDE SCH MLS/HR: 0.9 INJECTION, SOLUTION INTRAVENOUS at 21:35

## 2017-02-24 RX ADMIN — ENOXAPARIN SODIUM SCH MG: 100 INJECTION SUBCUTANEOUS at 21:34

## 2017-02-24 NOTE — PROGRESS NOTE
Assessment and Plan


Assessment and plan: 





Syncope, likely vasovagal


H/o COPD not in exacerbation


HTN, benign essential


Obesity due to access calorie


Acute diarrhea, likely gastroenteritis








Plan:


serial cardiac enzyme and EKG normal


IV fluid, stool study pending


follow 2-D echocardiogram report 


carotid Doppler showed <50% stenosis b/l


Cardiology recommended stress test in the am


Supportive care, hold BP meds, orthostatic vitals


Provide GI and dvt prophylaxis














History


Interval history: 





Pt seen and examined


denies any lightheadedness today, no chest pain, diarrhea resolved








Hospitalist Physical





- Physical exam


Narrative exam: 





GENERAL: This is well-developed obese WF lying on bed appeared to be in no 

discomfort. 


HEENT: Normocephalic. No conjunctival congestion or icterus. Patient has moist 

mucous membranes. External auditory canal and nares patent bilaterally.


NECK: Supple.  Trachea midline. 


CHEST/LUNGS: Clear to auscultated bilaterally.  breathing nonlabored. 


HEART/CARDIOVASCULAR: Regular in rate and rhythm.  PMI at the apex.  .


ABDOMEN: Abdomen is soft, nontender.  Patient has normal bowel sounds. 


SKIN: There is no rash, Warm and dry.


NEUROLOGY: The patient is awake, alert, and oriented.  No focal motor deficit.


MUSCULOSKELETAL: No joint effusion or tenderness. 


EXTRIMITY: No edema, cyanosis or clubbing.


PSYCH: No depression or anxiety noted. Cooperative.





- Constitutional


Vitals: 


 











Temp Pulse Resp BP Pulse Ox


 


 98.2 F   69   18   115/59   95 


 


 02/24/17 20:41  02/24/17 20:41  02/24/17 20:59  02/24/17 20:41  02/24/17 20:41











General appearance: Present: no acute distress, obese





Results





- Labs


CBC & Chem 7: 


 02/23/17 12:50





 02/23/17 12:50


Labs: 


 Laboratory Last Values











WBC  7.7 K/mm3 (4.5-11.0)   02/23/17  12:50    


 


RBC  5.32 M/mm3 (3.65-5.03)  H  02/23/17  12:50    


 


Hgb  15.2 gm/dl (10.1-14.3)  H  02/23/17  12:50    


 


Hct  45.7 % (30.3-42.9)  H  02/23/17  12:50    


 


MCV  86 fl (79-97)   02/23/17  12:50    


 


MCH  29 pg (28-32)   02/23/17  12:50    


 


MCHC  33 % (30-34)   02/23/17  12:50    


 


RDW  13.9 % (13.2-15.2)   02/23/17  12:50    


 


Plt Count  169 K/mm3 (140-440)   02/23/17  12:50    


 


Add Manual Diff  Complete   02/23/17  12:50    


 


Total Counted  100   02/23/17  12:50    


 


Seg Neutrophils %  Np   02/23/17  12:50    


 


Seg Neuts % (Manual)  87.0 % (40.0-70.0)  H  02/23/17  12:50    


 


Band Neutrophils %  8.0 %  02/23/17  12:50    


 


Lymphocytes % (Manual)  3.0 % (13.4-35.0)  L  02/23/17  12:50    


 


Reactive Lymphs % (Man)  0 %  02/23/17  12:50    


 


Monocytes % (Manual)  1.0 % (0.0-7.3)   02/23/17  12:50    


 


Eosinophils % (Manual)  1.0 % (0.0-4.3)   02/23/17  12:50    


 


Basophils % (Manual)  0 % (0.0-1.8)   02/23/17  12:50    


 


Metamyelocytes %  0 %  02/23/17  12:50    


 


Myelocytes %  0 %  02/23/17  12:50    


 


Promyelocytes %  0 %  02/23/17  12:50    


 


Blast Cells %  0 %  02/23/17  12:50    


 


Nucleated RBC %  Not Reportable   02/23/17  12:50    


 


Seg Neutrophils # Man  6.7 K/mm3 (1.8-7.7)   02/23/17  12:50    


 


Band Neutrophils #  0.6 K/mm3  02/23/17  12:50    


 


Lymphocytes # (Manual)  0.2 K/mm3 (1.2-5.4)  L  02/23/17  12:50    


 


Abs React Lymphs (Man)  0.0 K/mm3  02/23/17  12:50    


 


Monocytes # (Manual)  0.1 K/mm3 (0.0-0.8)   02/23/17  12:50    


 


Eosinophils # (Manual)  0.1 K/mm3 (0.0-0.4)   02/23/17  12:50    


 


Basophils # (Manual)  0.0 K/mm3 (0.0-0.1)   02/23/17  12:50    


 


Metamyelocytes #  0.0 K/mm3  02/23/17  12:50    


 


Myelocytes #  0.0 K/mm3  02/23/17  12:50    


 


Promyelocytes #  0.0 K/mm3  02/23/17  12:50    


 


Blast Cells #  0.0 K/mm3  02/23/17  12:50    


 


WBC Morphology  Not Reportable   02/23/17  12:50    


 


Hypersegmented Neuts  Not Reportable   02/23/17  12:50    


 


Hyposegmented Neuts  Not Reportable   02/23/17  12:50    


 


Hypogranular Neuts  Not Reportable   02/23/17  12:50    


 


Smudge Cells  Not Reportable   02/23/17  12:50    


 


Toxic Granulation  Not Reportable   02/23/17  12:50    


 


Toxic Vacuolation  Not Reportable   02/23/17  12:50    


 


Dohle Bodies  Not Reportable   02/23/17  12:50    


 


Pelger-Huet Anomaly  Not Reportable   02/23/17  12:50    


 


Chanelle Rods  Not Reportable   02/23/17  12:50    


 


Platelet Estimate  Appears decreased   02/23/17  12:50    


 


Clumped Platelets  Not Reportable   02/23/17  12:50    


 


Plt Clumps, EDTA  Not Reportable   02/23/17  12:50    


 


Large Platelets  Not Reportable   02/23/17  12:50    


 


Giant Platelets  Not Reportable   02/23/17  12:50    


 


Platelet Satelliting  Not Reportable   02/23/17  12:50    


 


Plt Morphology Comment  Not Reportable   02/23/17  12:50    


 


RBC Morphology  Normal   02/23/17  12:50    


 


Dimorphic RBCs  Not Reportable   02/23/17  12:50    


 


Polychromasia  Not Reportable   02/23/17  12:50    


 


Hypochromasia  Not Reportable   02/23/17  12:50    


 


Poikilocytosis  Not Reportable   02/23/17  12:50    


 


Anisocytosis  Not Reportable   02/23/17  12:50    


 


Microcytosis  Not Reportable   02/23/17  12:50    


 


Macrocytosis  Not Reportable   02/23/17  12:50    


 


Spherocytes  Not Reportable   02/23/17  12:50    


 


Pappenheimer Bodies  Not Reportable   02/23/17  12:50    


 


Sickle Cells  Not Reportable   02/23/17  12:50    


 


Target Cells  Not Reportable   02/23/17  12:50    


 


Tear Drop Cells  Not Reportable   02/23/17  12:50    


 


Ovalocytes  Not Reportable   02/23/17  12:50    


 


Helmet Cells  Not Reportable   02/23/17  12:50    


 


[place] Bodies  Not Reportable   02/23/17  12:50    


 


Cabot Rings  Not Reportable   02/23/17  12:50    


 


[place] Cells  Not Reportable   02/23/17  12:50    


 


Bite Cells  Not Reportable   02/23/17  12:50    


 


Crenated Cell  Not Reportable   02/23/17  12:50    


 


Elliptocytes  Not Reportable   02/23/17  12:50    


 


Acanthocytes (Spur)  Not Reportable   02/23/17  12:50    


 


Rouleaux  Not Reportable   02/23/17  12:50    


 


Hemoglobin C Crystals  Not Reportable   02/23/17  12:50    


 


Schistocytes  Not Reportable   02/23/17  12:50    


 


Malaria parasites  Not Reportable   02/23/17  12:50    


 


Charly Bodies  Not Reportable   02/23/17  12:50    


 


Hem Pathologist Commnt  No   02/23/17  12:50    


 


PT  12.5 Sec. (12.2-14.9)   02/23/17  12:50    


 


INR  0.94  (0.87-1.13)   02/23/17  12:50    


 


D-Dimer  282.05 ng/mlDDU (0-234)  H  02/23/17  12:50    


 


Sodium  138 mmol/L (137-145)   02/23/17  12:50    


 


Potassium  4.0 mmol/L (3.6-5.0)   02/23/17  12:50    


 


Chloride  97.1 mmol/L ()  L  02/23/17  12:50    


 


Carbon Dioxide  27 mmol/L (22-30)   02/23/17  12:50    


 


Anion Gap  18 mmol/L  02/23/17  12:50    


 


BUN  12 mg/dL (7-17)   02/23/17  12:50    


 


Creatinine  0.9 mg/dL (0.7-1.2)   02/23/17  12:50    


 


Estimated GFR  > 60 ml/min  02/23/17  12:50    


 


BUN/Creatinine Ratio  13.33 %  02/23/17  12:50    


 


Glucose  147 mg/dL ()  H  02/23/17  12:50    


 


Calcium  8.9 mg/dL (8.4-10.2)   02/23/17  12:50    


 


Magnesium  3.0 mg/dL (1.7-2.3)  H  02/23/17  12:50    


 


Total Bilirubin  0.6 mg/dL (0.1-1.2)   02/23/17  12:50    


 


AST  16 units/L (5-40)   02/23/17  12:50    


 


ALT  18 units/L (7-56)   02/23/17  12:50    


 


Alkaline Phosphatase  151 units/L ()  H  02/23/17  12:50    


 


Total Creatine Kinase  82 units/L ()   02/23/17  23:33    


 


CK-MB (CK-2)  1.5 ng/mL (0.0-4.0)   02/23/17  20:38    


 


CK-MB (CK-2) Rel Index  1.7  (0-4)   02/23/17  23:33    


 


Troponin T  < 0.010 ng/mL (0.00-0.029)   02/23/17  23:33    


 


NT-Pro-B Natriuret Pep  422.9 pg/mL (0-900)   02/23/17  12:50    


 


Total Protein  7.5 g/dL (6.3-8.2)   02/23/17  12:50    


 


Albumin  4.4 g/dL (3.9-5)   02/23/17  12:50    


 


Albumin/Globulin Ratio  1.4 %  02/23/17  12:50    


 


Triglycerides  143 mg/dL (2-149)   02/23/17  19:56    


 


Cholesterol  224 mg/dL ()  H  02/23/17  19:56    


 


LDL Cholesterol Direct  148 mg/dL ()  H  02/23/17  19:56    


 


HDL Cholesterol  48 mg/dL (40-59)   02/23/17  19:56    


 


Cholesterol/HDL Ratio  4.66 %  02/23/17  19:56    


 


TSH  2.310 mlU/mL (0.270-4.200)   02/23/17  12:50    


 


Urine Color  Straw  (Yellow)   02/23/17  13:11    


 


Urine Turbidity  Clear  (Clear)   02/23/17  13:11    


 


Urine pH  7.0  (5.0-7.0)   02/23/17  13:11    


 


Ur Specific Gravity  1.006  (1.003-1.030)   02/23/17  13:11    


 


Urine Protein  <15 mg/dl mg/dL (Negative)   02/23/17  13:11    


 


Urine Glucose (UA)  Neg mg/dL (Negative)   02/23/17  13:11    


 


Urine Ketones  Neg mg/dL (Negative)   02/23/17  13:11    


 


Urine Blood  Neg  (Negative)   02/23/17  13:11    


 


Urine Nitrite  Neg  (Negative)   02/23/17  13:11    


 


Urine Bilirubin  Neg  (Negative)   02/23/17  13:11    


 


Urine Urobilinogen  < 2.0 mg/dL (<2.0)   02/23/17  13:11    


 


Ur Leukocyte Esterase  Neg  (Negative)   02/23/17  13:11    


 


Urine WBC (Auto)  < 1.0 /HPF (0.0-6.0)   02/23/17  13:11    


 


Urine RBC (Auto)  1.0 /HPF (0.0-6.0)   02/23/17  13:11    


 


U Epithel Cells (Auto)  < 1.0 /HPF (0-13.0)   02/23/17  13:11    


 


Urine Bacteria (Auto)  1+ /HPF (Negative)   02/23/17  13:11

## 2017-02-24 NOTE — CONSULTATION
History of Present Illness


Consult date: 02/24/17


Requesting physician: SANTANA NOEL


Consult reason: syncope


History of present illness: 


The patient is a 66 year old female with a history of hypertension, 

hyperlipidemia, COPD who presented following a syncopal episode at home. She 

states that recently her blood pressure has been running low and her PCP made 

some adjustments to her blood pressure medications last week. Since that time, 

she has experienced intermittent dizziness and lightheadness, especially when 

moving from a sitting to standing position. Yesterday, she was standing in the 

kitchen and as she was walking to answer the front door, she felt very 

lightheaded, leaned back against the wall and slid down to the floor. Her 

 reports that she was out for several minutes. She denies any chest pain

, palpitations, shortness of breath, nausea or vomiting. She does report 

several days of diarrhea earlier this week. Troponin negative x 3. Head CT 

unremarkable. Chest CTA negative for PE. Carotids negative. No recent ischemic 

evaluation. 





Past History


Past Medical History: COPD, hypertension, hyperlipidemia


Past Surgical History: No surgical history


Social history: .  denies: smoking, alcohol abuse, prescription drug 

abuse, IV drug use


Family history: CAD





Medications and Allergies


 Allergies











Allergy/AdvReac Type Severity Reaction Status Date / Time


 


No Known Allergies Allergy   Verified 02/23/17 13:05











 Home Medications











 Medication  Instructions  Recorded  Confirmed  Last Taken  Type


 


ALBUTEROL Inhaler [ProAir HFA 2 puff IH QID PRN 02/23/17 02/23/17 02/23/17 

History





Inhaler]     


 


Atenolol [Tenormin] 100 mg PO DAILY 02/23/17 02/23/17 02/23/17 History


 


Furosemide [Lasix TAB] 40 mg PO QDAY 02/23/17 02/23/17 02/23/17 History


 


Lisinopril [Zestril TAB] 40 mg PO QDAY 02/23/17 02/23/17 02/23/17 History


 


Omeprazole 20 mg PO DAILY 02/23/17 02/23/17 02/23/17 History


 


Potassium Chloride [K-Dur] 10 meq PO QDAY 02/23/17 02/23/17 02/23/17 History


 


Umeclidinium Brm/Vilanterol Tr 1 puff PO DAILY 02/23/17 02/23/17 02/23/17 

History





[Anoro Ellipta 62.5-25 Mcg INH]     


 


amLODIPine [Norvasc] 10 mg PO DAILY 02/23/17 02/23/17 02/23/17 History











Active Meds: 


Active Medications





Aspirin (Ecotrin)  325 mg PO QDAY [facility]


   Last Admin: 02/24/17 10:47 Dose:  325 mg


Enoxaparin Sodium (Lovenox)  40 mg SUB-Q QDAY@2200 [facility]


   Last Admin: 02/23/17 21:11 Dose:  40 mg


Famotidine (Pepcid)  20 mg PO BID [facility]


   Last Admin: 02/24/17 10:47 Dose:  20 mg


Sodium Chloride (Nacl 0.9% 1000 Ml)  1,000 mls @ 100 mls/hr IV AS DIRECT [facility]


   Last Admin: 02/24/17 10:47 Dose:  100 mls/hr


Morphine Sulfate (Morphine)  2 mg IV Q5MIN PRN


   PRN Reason: Chest Pain


Sodium Chloride (Sodium Chloride Flush Syringe 10 Ml)  10 ml IV PRN PRN


   PRN Reason: LINE FLUSH











Review of Systems


Constitutional: fatigue, no fever, no chills


Ears, nose, mouth and throat: no nasal congestion, no nasal discharge, no sinus 

pressure


Cardiovascular: syncope, leg edema, no chest pain, no palpitations, no 

shortness of breath


Respiratory: no cough, no shortness of breath, no congestion, no wheezing


Gastrointestinal: diarrhea, no abdominal pain, no nausea, no vomiting


Genitourinary Female: no dysuria, no urgency


Musculoskeletal: no neck stiffness, no neck pain, no myalgias


Integumentary: no rash, no pruritis


Neurological: syncope, no parathesias, no numbness, no tingling, no headaches


Endocrine: no cold intolerance, no heat intolerance


Hematologic/Lymphatic: no easy bruising, no easy bleeding


Allergic/Immunologic: no urticaria, no wheezing





Physical Examination


 Vital Signs











Temp Pulse Resp BP Pulse Ox


 


 98 F   67   16   156/82   95 


 


 02/23/17 11:43  02/23/17 11:43  02/23/17 11:43  02/23/17 11:43  02/23/17 11:43











General appearance: no acute distress, obese


HEENT: Positive: PERRL, Normocephaly, Mucus Membranes Moist


Neck: Positive: neck supple, trachea midline


Cardiac: Positive: Reg Rate and Rhythm, S1/S2


Lungs: Positive: clear to auscultation


Neuro: Positive: Grossly Intact


Abdomen: Positive: Soft, Active Bowel Sounds.  Negative: Tender


Skin: Positive: Clear.  Negative: Rash


Extremities: Present: +1 Edema (left lower leg)





Results





 02/23/17 12:50





 02/23/17 12:50


 Cardiac Enzymes











  02/23/17 Range/Units





  20:38 


 


CK-MB (CK-2)  1.5  (0.0-4.0)  ng/mL








 Lipids











  02/23/17 Range/Units





  19:56 


 


Triglycerides  143  (2-149)  mg/dL


 


Cholesterol  224 H  ()  mg/dL


 


HDL Cholesterol  48  (40-59)  mg/dL


 


Cholesterol/HDL Ratio  4.66  %














- Imaging and Cardiology


Echo: pending


EKG: image reviewed





EKG interpretations





- Telemetry


EKG Rhythm: Sinus Rhythm





- EKG


Sinus rhythms and dysrhythmias: sinus rhythm





Assessment and Plan


Syncope


   head CT negative


   chest CTA negative for PE


   carotids negative


   obtain orthostatics


   await echo findings


   Lexiscan thallium stress test in am


   will continue to observe on the monitor





Hypertension





Hyperlipidemia





COPD








Obtain orthostatics. Await echo findings. Lexiscan thallium stress test in am. 

Will continue to observe on the monitor. 





The patient has been seen in conjunction with Dr. West who agrees with the 

assessment and plan of care.





Thank you Dr. Noel for allowing us to participate in the care of this 

patient.

## 2017-02-25 VITALS — DIASTOLIC BLOOD PRESSURE: 81 MMHG | SYSTOLIC BLOOD PRESSURE: 164 MMHG

## 2017-02-25 RX ADMIN — ASPIRIN SCH MG: 325 TABLET, COATED ORAL at 12:54

## 2017-02-25 RX ADMIN — FAMOTIDINE SCH MG: 20 TABLET ORAL at 12:54

## 2017-02-25 NOTE — DISCHARGE SUMMARY
Providers





- Providers


Date of Admission: 


02/23/17 15:02





Date of discharge: 02/25/17


Attending physician: 


SANTANA NOEL





 





02/23/17


Consult to Cardiac Rehabilitation [CONS] Routine 


   Reason For Exam: Phase I





02/24/17 12:45


Consult to Physician [CONS] Routine 


   Consulting Provider: IFEANYI MENA


   Reason For Exam: syncope


   Place consult to:: cardiology on call


   Notified:: TED


   Was contact made?: Yes











Primary care physician: 


PRIMARY CARE MD








Hospitalization


Condition: Good


Hospital course: 





Discharge Diagnosis:


Syncope with prodrome, likley vasovagal:


   head CT negative


   chest CTA negative for PE


   carotids negative


   tte - nl lv fxn, no sig valvulopathy or pericardial effusion


   Lexiscan thallium stress test this reveals no ischemia/infarct and nl lv fxn


   tele unremarkable


H/o COPD not in exacerbation


HTN, benign essential


Obesity due to access calorie


Acute diarrhea, likely gastroenteritis, resolved











Disposition: DISCHARGED TO HOME OR SELFCARE


Time spent for discharge: 32 minutes





Core Measure Documentation





- Palliative Care


Palliative Care/ Comfort Measures: Not Applicable





- Core Measures


Any of the following diagnoses?: none





Exam





- Physical Exam


Narrative exam: 





GENERAL: This is well-developed obese WF lying on bed appeared to be in no 

discomfort. 


HEENT: Normocephalic. No conjunctival congestion or icterus. Patient has moist 

mucous membranes. External auditory canal and nares patent bilaterally.


NECK: Supple.  Trachea midline. 


CHEST/LUNGS: Clear to auscultated bilaterally.  breathing nonlabored. 


HEART/CARDIOVASCULAR: Regular in rate and rhythm.  PMI at the apex.  .


ABDOMEN: Abdomen is soft, nontender.  Patient has normal bowel sounds. 


SKIN: There is no rash, Warm and dry.


NEUROLOGY: The patient is awake, alert, and oriented.  No focal motor deficit.


MUSCULOSKELETAL: No joint effusion or tenderness. 


EXTRIMITY: No edema, cyanosis or clubbing.


PSYCH: No depression or anxiety noted. Cooperative.





- Constitutional


Vitals: 


 











Temp Pulse Resp BP Pulse Ox


 


 97.6 F   95 H  20   164/81   95 


 


 02/25/17 08:00  02/25/17 12:00  02/25/17 08:00  02/25/17 11:07  02/25/17 08:00














Plan


Activity: advance as tolerated, fall precautions


Weight Bearing Status: Non-Weight Bearing


Diet: low cholesterol, low salt


Follow up with: 


PRIMARY CARE,MD [Primary Care Provider] - 3-5 Days

## 2017-02-25 NOTE — PROGRESS NOTE
Assessment and Plan





67yo WF:





Syncope with prodrome:


   head CT negative


   chest CTA negative for PE


   carotids negative


   tte - nl lv fxn, no sig valvulopathy or pericardial effusion


   Lexiscan thallium stress test this reveals no ischemia/infarct and nl lv fxn


   tele unremarkable





Hypertension





Hyperlipidemia





COPD





stable cv status


f/u with us in office in 2 weeks








Subjective


Date of service: 02/25/17


Interval history: 


seen in stress lab 


feels well








Objective


 Vital Signs











  Temp Pulse Pulse Resp BP Pulse Ox


 


 02/25/17 08:00  97.6 F   67  20  144/75  95


 


 02/25/17 04:58  97.4 F L   65  20  108/60  96


 


 02/25/17 00:16  98.0 F   72  20  113/60  92


 


 02/25/17 00:00   80    


 


 02/24/17 20:59     18  


 


 02/24/17 20:41  98.2 F   69  18  115/59  95


 


 02/24/17 16:00  97.9 F   81  20  119/68  92


 


 02/24/17 12:15   86    


 


 02/24/17 12:00  98.0 F   78  18  118/59  93














- Physical Examination


HEENT: Positive: PERRL, Normocephaly, Mucus Membranes Moist


Neck: Positive: neck supple, trachea midline


Neuro: Positive: Grossly Intact


Abdomen: Positive: Soft, Active Bowel Sounds.  Negative: Tender


Skin: Positive: Clear.  Negative: Rash


Extremities: Present: +1 Edema (left lower leg)





- Imaging and Cardiology


EKG: image reviewed


Echo: pending





- EKG


Sinus rhythms and dysrhythmias: sinus rhythm

## 2017-02-25 NOTE — TREADMILL REPORT
NUCLEAR PERFUSION SCAN



REFERRING PHYSICIAN:  Hospitalist service _____.



PROTOCOL:  The patient was brought to the stress lab in a post-absorptive state,

given 10 mCi of technetium 99m at rest.  The patient underwent rest imaging. 

The patient underwent Lexiscan stress test.  At peak stress, the patient was

given 26 mCi of technetium 99m.  Shortly thereafter, the patient underwent

stress imaging.  Raw imaging reveals mild GI artifact.  No significant motion

artifact.  SPECT imaging examined carefully in the horizontal long axis,

vertical long axis, and short axis views.  There is normal homogenous uptake of

radioisotope in all reported segments.  No evidence of significant fixed or

reversible perfusion defect suggestive of prior infarction or ischemia.  Gated

wall motion reveals normal systolic thickening.  Calculated ejection fraction of

61%.  No TID.



CONCLUSIONS:

1.  Normal myocardial perfusion scan without evidence of active ischemia or

prior infarction.

2.  Normal left ventricular systolic performance without evidence of transient

ischemic dilatation or stress-induced segmental wall motion abnormalities.

3.  Lexiscan stress test is reported separately.





DD: 02/25/2017 11:58

DT: 02/25/2017 12:49

JOB# [id number]  [id number]

SBM/NTS

## 2018-04-20 ENCOUNTER — HOSPITAL ENCOUNTER (OUTPATIENT)
Dept: HOSPITAL 5 - MAMMO | Age: 68
Discharge: HOME | End: 2018-04-20
Attending: HOSPITALIST
Payer: MEDICARE

## 2018-04-20 DIAGNOSIS — Z12.31: Primary | ICD-10-CM

## 2018-04-20 PROCEDURE — 77067 SCR MAMMO BI INCL CAD: CPT

## 2018-04-20 NOTE — MAMMOGRAPHY REPORT
BILATERAL DIGITAL SCREENING MAMMOGRAM with CAD: 04/20/18 08:27:00



CLINICAL: Routine screening.



COMPARISON:06/29/15



FINDINGS: The breasts are almost entirely fatty.Stable bilateral 

heavily calcified oval circumscribed masses. No new mass, architectural 

distortion or suspicious calcifications.



IMPRESSION: No mammographic evidence of malignancy.



BI-RADS CATEGORY:  2 -- Benign



RECOMMENDATION: Routine mammographic screening in one year.





COMMENT:

Patient follow-up letters are generated by our [employer] application.

## 2019-02-28 ENCOUNTER — HOSPITAL ENCOUNTER (INPATIENT)
Dept: HOSPITAL 5 - ED | Age: 69
LOS: 7 days | Discharge: NURSING, REHAB OR LONG-TERM CARE | DRG: 61 | End: 2019-03-07
Attending: INTERNAL MEDICINE | Admitting: INTERNAL MEDICINE
Payer: MEDICARE

## 2019-02-28 DIAGNOSIS — I50.9: ICD-10-CM

## 2019-02-28 DIAGNOSIS — Z79.899: ICD-10-CM

## 2019-02-28 DIAGNOSIS — G81.91: ICD-10-CM

## 2019-02-28 DIAGNOSIS — E66.9: ICD-10-CM

## 2019-02-28 DIAGNOSIS — R47.01: ICD-10-CM

## 2019-02-28 DIAGNOSIS — I11.0: ICD-10-CM

## 2019-02-28 DIAGNOSIS — K80.20: ICD-10-CM

## 2019-02-28 DIAGNOSIS — J44.9: ICD-10-CM

## 2019-02-28 DIAGNOSIS — Z99.81: ICD-10-CM

## 2019-02-28 DIAGNOSIS — Z82.49: ICD-10-CM

## 2019-02-28 DIAGNOSIS — E78.5: ICD-10-CM

## 2019-02-28 DIAGNOSIS — J96.01: ICD-10-CM

## 2019-02-28 DIAGNOSIS — I63.9: Primary | ICD-10-CM

## 2019-02-28 LAB
ALBUMIN SERPL-MCNC: 4.4 G/DL (ref 3.9–5)
ALT SERPL-CCNC: 14 UNITS/L (ref 7–56)
APTT BLD: 20.1 SEC. (ref 24.2–36.6)
BASOPHILS # (AUTO): 0 K/MM3 (ref 0–0.1)
BASOPHILS NFR BLD AUTO: 0.1 % (ref 0–1.8)
BENZODIAZEPINES SCREEN,URINE: (no result)
BILIRUB UR QL STRIP: (no result)
BLOOD UR QL VISUAL: (no result)
BUN SERPL-MCNC: 24 MG/DL (ref 7–17)
BUN/CREAT SERPL: 24 %
CALCIUM SERPL-MCNC: 9.3 MG/DL (ref 8.4–10.2)
EOSINOPHIL # BLD AUTO: 0 K/MM3 (ref 0–0.4)
EOSINOPHIL NFR BLD AUTO: 0 % (ref 0–4.3)
HCT VFR BLD CALC: 48.3 % (ref 30.3–42.9)
HEMOLYSIS INDEX: 34
HGB BLD-MCNC: 16.3 GM/DL (ref 10.1–14.3)
INR PPP: 0.96 (ref 0.87–1.13)
LYMPHOCYTES # BLD AUTO: 0.6 K/MM3 (ref 1.2–5.4)
LYMPHOCYTES NFR BLD AUTO: 6.8 % (ref 13.4–35)
MCHC RBC AUTO-ENTMCNC: 34 % (ref 30–34)
MCV RBC AUTO: 86 FL (ref 79–97)
METHADONE SCREEN,URINE: (no result)
MONOCYTES # (AUTO): 0.4 K/MM3 (ref 0–0.8)
MONOCYTES % (AUTO): 4.4 % (ref 0–7.3)
MUCOUS THREADS #/AREA URNS HPF: (no result) /HPF
OPIATE SCREEN,URINE: (no result)
PH UR STRIP: 7 [PH] (ref 5–7)
PLATELET # BLD: 214 K/MM3 (ref 140–440)
PROT UR STRIP-MCNC: (no result) MG/DL
RBC # BLD AUTO: 5.62 M/MM3 (ref 3.65–5.03)
RBC #/AREA URNS HPF: 1 /HPF (ref 0–6)
UROBILINOGEN UR-MCNC: < 2 MG/DL (ref ?–2)
WBC #/AREA URNS HPF: < 1 /HPF (ref 0–6)

## 2019-02-28 PROCEDURE — 71045 X-RAY EXAM CHEST 1 VIEW: CPT

## 2019-02-28 PROCEDURE — 84484 ASSAY OF TROPONIN QUANT: CPT

## 2019-02-28 PROCEDURE — 96361 HYDRATE IV INFUSION ADD-ON: CPT

## 2019-02-28 PROCEDURE — 80048 BASIC METABOLIC PNL TOTAL CA: CPT

## 2019-02-28 PROCEDURE — 93306 TTE W/DOPPLER COMPLETE: CPT

## 2019-02-28 PROCEDURE — 36415 COLL VENOUS BLD VENIPUNCTURE: CPT

## 2019-02-28 PROCEDURE — 70551 MRI BRAIN STEM W/O DYE: CPT

## 2019-02-28 PROCEDURE — 82803 BLOOD GASES ANY COMBINATION: CPT

## 2019-02-28 PROCEDURE — 36600 WITHDRAWAL OF ARTERIAL BLOOD: CPT

## 2019-02-28 PROCEDURE — 74176 CT ABD & PELVIS W/O CONTRAST: CPT

## 2019-02-28 PROCEDURE — 82550 ASSAY OF CK (CPK): CPT

## 2019-02-28 PROCEDURE — 96374 THER/PROPH/DIAG INJ IV PUSH: CPT

## 2019-02-28 PROCEDURE — 3E05317 INTRODUCTION OF OTHER THROMBOLYTIC INTO PERIPHERAL ARTERY, PERCUTANEOUS APPROACH: ICD-10-PCS | Performed by: INTERNAL MEDICINE

## 2019-02-28 PROCEDURE — 70496 CT ANGIOGRAPHY HEAD: CPT

## 2019-02-28 PROCEDURE — 82553 CREATINE MB FRACTION: CPT

## 2019-02-28 PROCEDURE — 94640 AIRWAY INHALATION TREATMENT: CPT

## 2019-02-28 PROCEDURE — 93010 ELECTROCARDIOGRAM REPORT: CPT

## 2019-02-28 PROCEDURE — 74018 RADEX ABDOMEN 1 VIEW: CPT

## 2019-02-28 PROCEDURE — 80307 DRUG TEST PRSMV CHEM ANLYZR: CPT

## 2019-02-28 PROCEDURE — 93005 ELECTROCARDIOGRAM TRACING: CPT

## 2019-02-28 PROCEDURE — 85670 THROMBIN TIME PLASMA: CPT

## 2019-02-28 PROCEDURE — 85610 PROTHROMBIN TIME: CPT

## 2019-02-28 PROCEDURE — 99292 CRITICAL CARE ADDL 30 MIN: CPT

## 2019-02-28 PROCEDURE — 76700 US EXAM ABDOM COMPLETE: CPT

## 2019-02-28 PROCEDURE — 80061 LIPID PANEL: CPT

## 2019-02-28 PROCEDURE — 96375 TX/PRO/DX INJ NEW DRUG ADDON: CPT

## 2019-02-28 PROCEDURE — 81001 URINALYSIS AUTO W/SCOPE: CPT

## 2019-02-28 PROCEDURE — 85730 THROMBOPLASTIN TIME PARTIAL: CPT

## 2019-02-28 PROCEDURE — 70450 CT HEAD/BRAIN W/O DYE: CPT

## 2019-02-28 PROCEDURE — 83036 HEMOGLOBIN GLYCOSYLATED A1C: CPT

## 2019-02-28 PROCEDURE — 94660 CPAP INITIATION&MGMT: CPT

## 2019-02-28 PROCEDURE — 80053 COMPREHEN METABOLIC PANEL: CPT

## 2019-02-28 PROCEDURE — 85025 COMPLETE CBC W/AUTO DIFF WBC: CPT

## 2019-02-28 PROCEDURE — 70498 CT ANGIOGRAPHY NECK: CPT

## 2019-02-28 PROCEDURE — 93880 EXTRACRANIAL BILAT STUDY: CPT

## 2019-02-28 PROCEDURE — 70544 MR ANGIOGRAPHY HEAD W/O DYE: CPT

## 2019-02-28 PROCEDURE — 94760 N-INVAS EAR/PLS OXIMETRY 1: CPT

## 2019-02-28 PROCEDURE — 83690 ASSAY OF LIPASE: CPT

## 2019-02-28 NOTE — CONSULTATION
History of Present Illness


Consult date: 02/28/19


Chief complaint: 





right sided weakness


History of present illness: 





This is a 67 YO F who sudeenly became weak at her pulmonologists's office and 

was brought to the ED.  Pt evaluated by teleneuro and given Alteplase.  On my 

arrival pt is sleepy but is moving all extremities.  Family is at bedside but 

not much additional history given.





Past History


Past Medical History: COPD, heart failure, hypertension


Past Surgical History: No surgical history, Other (reviewed)


Social history: , lives with family.  denies: smoking, alcohol abuse, 

prescription drug abuse


Family history: hypertension





Medications and Allergies


                                    Allergies











Allergy/AdvReac Type Severity Reaction Status Date / Time


 


No Known Allergies Allergy   Verified 02/23/17 13:05











                                Home Medications











 Medication  Instructions  Recorded  Confirmed  Last Taken  Type


 


Furosemide [Lasix TAB] 40 mg PO DAILY 02/23/17 02/28/19 02/23/17 History


 


Potassium Chloride [K-Dur] 10 meq PO DAILY 02/23/17 02/28/19 02/23/17 History


 


Albuterol Sulfate [Ventolin HFA] 1 puff IH PRN PRN 02/28/19 02/28/19 Unknown 

History


 


Atenolol [Tenormin] 100 mg PO DAILY 02/28/19 02/28/19 Unknown History


 


Lisinopril [Zestril] 40 mg PO DAILY 02/28/19 02/28/19 Unknown History


 


Loratadine [Claritin] 10 mg PO DAILY 02/28/19 02/28/19 Unknown History


 


Omeprazole 40 mg PO DAILY 02/28/19 02/28/19 Unknown History


 


levoFLOXacin [Levaquin] 750 mg PO DAILY 02/28/19 02/28/19 Unknown History


 


predniSONE [Deltasone] 60 mg PO DAILY 02/28/19 02/28/19 Unknown History











Active Meds: 


Active Medications





Acetaminophen (Tylenol)  650 mg PO Q4H PRN


   PRN Reason: Pain, Mild (1-3)


Aspirin (Aspirin)  325 mg PO QDAY [facility]


Atorvastatin Calcium (Lipitor)  40 mg PO QHS [facility]


Bisacodyl (Dulcolax)  10 mg GA QDAY PRN


   PRN Reason: Constipation


Furosemide (Lasix)  40 mg PO DAILY [facility]


Magnesium Hydroxide (Milk Of Magnesia)  30 ml PO Q4H PRN


   PRN Reason: Constipation


Metoclopramide HCl (Reglan)  10 mg PO Q6H PRN


   PRN Reason: Nausea And Vomiting


Ondansetron HCl (Zofran)  4 mg IV Q8H PRN


   PRN Reason: Nausea And Vomiting


Potassium Chloride (K-Dur)  10 meq PO DAILY [facility]


Prednisone (Deltasone)  60 mg PO DAILY [facility]


Promethazine HCl (Phenergan)  25 mg GA Q6H PRN


   PRN Reason: Nausea And Vomiting


Sodium Chloride (Sodium Chloride Flush Syringe 10 Ml)  10 ml IV PRN PRN


   PRN Reason: LINE FLUSH











Review of Systems


ROS unobtainable: due to mental status





Physical Examination





- Vital Signs


Vital Signs: 


                                   Vital Signs











Temp Pulse Resp BP Pulse Ox


 


 98.3 F   74   26 H  167/93   98 


 


 02/28/19 13:10  02/28/19 13:10  02/28/19 13:10  02/28/19 13:10  02/28/19 13:10














- EENT


EENT: Present: PERRL, mucous membranes moist





- Respiratory


Respiratory: Present: lungs clear





- Cardiovascular


Cardiovascular: Present: regular rate


Extremities: Present: no peripheral edema bilatateraly





- Gastrointestinal


Gastrointestinal: Present: normoactive bowel sounds





- Neurologic


Cranial nerve examination: PERRL, EOMI, face symmetric, tongue midline


Speech examination: other (sparse speech but follows commands)


Detailed motor examination: other (will move all extremities just against 

gravity, might be slightly weaker on the right)


Reflexes: 1+: ankle, bicep, knee, tricep





- Assessment


Assessment Interval: Baseline





- Level of Consciousness


1a. Level of Consciousness: arousable/minor stimuli





- LOC Questions


1b. LOC Questions: answers no questions correctly





- LOC Command


1c. LOC Commands: performs tasks correctly





- Best Gaze


2. Best Gaze: normal





- Visual


3. Visual: no visual loss





- Facial Palsy


4. Facial Palsy: normal symmetrical movement





- Motor Arm


5b. Motor Arm Right: drift


5a. Motor Arm Left: drift





- Motor Leg


6b. Motor Leg Right: drift


6a. Motor Leg Left: drift





- Limb Ataxia


7. Limb Ataxia: present 2 limbs





- Sensory


8. Sensory: normal





- Best Language


9. Best Language: mute/global aphasia





- Dysarthria


10. Dysarthria: mute/anarrthric





- Extinction and Inattention


11. Extinction/Inattention: no abnormality





- Scoring


Total Score: 14


Stroke Severity: Moderate Stroke





Results





- Laboratory Findings


CBC and BMP: 


                                 02/28/19 13:20





                                 02/28/19 13:20


Abnormal Lab Findings: 


                                  Abnormal Labs











  02/28/19 02/28/19 02/28/19





  13:20 13:20 13:20


 


RBC  5.62 H  


 


Hgb  16.3 H  


 


Hct  48.3 H  


 


Lymph % (Auto)  6.8 L  


 


Lymph #  0.6 L  


 


Seg Neutrophils %  88.7 H  


 


Seg Neutrophils #  8.1 H  


 


APTT   20.1 L 


 


Chloride    96.9 L


 


BUN    24 H


 


Glucose    163 H














- Diagnostic Findings


Additional findings: 





CT head no bleed





Assessment and Plan





This is a 67 YO F with acute onset of right sided weakness, treated with 

Alteplase


REcommend:


ICU admission with post Alteplase protocol


Repeat CT head 24 hrs post Alteplase


MRI Brain, CTA head, neck noted, echo pending


PT/OT/ST


VTE prophylaxis


No anticoagulation or antiplatelet therapy until pt is cleared with 24 hr CT 

head


COntinue care for all medical issues as you are doing


POC discussed with pt and fmaily at bedside


Call with questions.

## 2019-02-28 NOTE — CAT SCAN REPORT
CT HEAD WITHOUT CONTRAST:



HISTORY:  Stroke symptoms.



TECHNIQUE:  Sequential 2.5mm CT images.



COMPARISON: 2/23/17.



FINDINGS:



Cerebral Parenchyma: Mild nonspecific chronic white matter changes are 

again noted. The remaining brain parenchyma demonstrates normal 

attenuation.



Cerebellum:  Within normal limits.



Brainstem:  Within normal limits.



Ventricles: Normal.



Sella:  Normal.



Extra-axial spaces:  Normal.



Basal Cisterns:  Normal.



Intracranial Hemorrhage:  None.



Midline Shift:  None.



Calvarium: Normal.



Sinuses: Normal.



Mastoid Air Cells:  Normal.



Visualized Orbits:  Normal.







IMPRESSION:

Nonspecific chronic white matter changes most likely representing 

chronic microangiopathy.

No acute intracranial process is identified.



These findings were discussed with Dr. Mejia in the emergency 

department at 1318 hrs.

## 2019-02-28 NOTE — CAT SCAN REPORT
PROCEDURE: CT ANGIO NECK 

 

TECHNIQUE:  Following administration of IV contrast axial helical imaging was performed through the n
shaji with sagittal and coronal reformatted images and maximum intensity projection images obtained. 

 

HISTORY: CVA 

 

COMPARISONS: CT angiogram brain also performed today  

 

FINDINGS: 

There is normal enhancement of the cervical carotid and vertebral arteries without evidence of occlus
ion, aneurysm or dissection. 

There is approximately 60% stenosis of the proximal left internal carotid artery. 

The right internal carotid artery is tortuous. 

There is no other evidence of stenosis. 

There is atherosclerotic vascular calcification at the carotid bifurcation bilaterally. 

There is normal enhancement of the major cervical venous structures. 

The bony structures are notable for cervical spondylosis. 

The visualized portions of the lungs are notable for the appearance of pulmonary emphysema. 

 

IMPRESSION: 

1. Approximately 60% stenosis proximal left internal carotid artery. 

2. Atherosclerotic vascular calcification carotid bifurcation bilaterally. 

3. Cervical spondylosis. 

4. Pulmonary emphysema. 

 

This document is electronically signed by Isi Ash MD., February 28 2019 06:45:50 PM ET

## 2019-02-28 NOTE — CAT SCAN REPORT
PROCEDURE: CT HEAD/BRAIN WO CON 

 

TECHNIQUE:  Axial images of the head obtained without intravenous contrast 

 

HISTORY: CVA, worsening post-TPA 

 

COMPARISONS: Prior examination from 1:08 PM 

 

FINDINGS: 

 

There is mild contrast enhancement secondary to residual contrast from prior CTA of the head and neck
. 

Ventricles are normal in size and configuration. 

No intracranial hemorrhage or hematoma. 

No evidence of intracranial mass or mass effect. 

No edema or sulcal effacement. Small air-fluid level in the left maxillary sinus again noted. 

No other significant interval change. 

 

IMPRESSION:  

 

No evidence of acute intracranial pathology. 

Mild contrast enhancement secondary to residual contrast from prior CTA of the head and neck.. 

 

This document is electronically signed by Micky Elaine MD., February 28 2019 10:06:57 PM ET

## 2019-02-28 NOTE — HISTORY AND PHYSICAL REPORT
History of Present Illness


Chief complaint: 





She had a stroke


History of present illness: 


67 YO Female with Obesity, HTN, COPD, CHF presents to ED for evaluation. Pt is 

lethargic and unable to provide history. Pt family is at bedside during exam and

interview and provides history. As per family, the patient experienced an acute 

onset of right arm and leg weakness, and became unresponsive while at a routine 

appointment at her pulmonologist's office. EMS was notified and upon arrival the

patient was found to have neurologic deficit. A code stroke was called and the 

patient was transported to [facility]. Pt seen and evaluated in ED and found to have 

Acute CVA and was terated with TPA in ED. Teleneurology consulted. Pt admitted 

to ICU and initiated on CVA protocol. No reports of fever, chills, CP, 

Palpitations, NVD, Trauma, productive cough, or recent ill contacts. 





Past History


Past Medical History: COPD, heart failure, hypertension


Past Surgical History: No surgical history, Other (reviewed)


Social history: , lives with family.  denies: smoking, alcohol abuse, 

prescription drug abuse


Family history: hypertension





Medications and Allergies


                                    Allergies











Allergy/AdvReac Type Severity Reaction Status Date / Time


 


No Known Allergies Allergy   Verified 02/23/17 13:05











                                Home Medications











 Medication  Instructions  Recorded  Confirmed  Last Taken  Type


 


Furosemide [Lasix TAB] 40 mg PO DAILY 02/23/17 02/28/19 02/23/17 History


 


Potassium Chloride [K-Dur] 10 meq PO DAILY 02/23/17 02/28/19 02/23/17 History


 


Albuterol Sulfate [Ventolin HFA] 1 puff IH PRN PRN 02/28/19 02/28/19 Unknown 

History


 


Atenolol [Tenormin] 100 mg PO DAILY 02/28/19 02/28/19 Unknown History


 


Lisinopril [Zestril] 40 mg PO DAILY 02/28/19 02/28/19 Unknown History


 


Loratadine [Claritin] 10 mg PO DAILY 02/28/19 02/28/19 Unknown History


 


Omeprazole 40 mg PO DAILY 02/28/19 02/28/19 Unknown History


 


levoFLOXacin [Levaquin] 750 mg PO DAILY 02/28/19 02/28/19 Unknown History


 


predniSONE [Deltasone] 60 mg PO DAILY 02/28/19 02/28/19 Unknown History














Review of Systems


ROS unobtainable: due to mental status





Exam





- Constitutional


Vitals: 


                                        











Temp Pulse Resp BP Pulse Ox


 


 98.3 F   84   19   128/71   94 


 


 02/28/19 13:10  02/28/19 15:31  02/28/19 15:31  02/28/19 15:31  02/28/19 15:31











General appearance: Present: mild distress, obese





- EENT


Eyes: Present: miosis





- Neck


Neck: Present: supple, normal ROM





- Respiratory


Respiratory effort: normal


Respiratory: bilateral: CTA





- Cardiovascular


Heart Sounds: Present: S1 & S2.  Absent: rub, click





- Extremities


Extremities: pulses symmetrical, No edema


Peripheral Pulses: within normal limits





- Abdominal


General gastrointestinal: Present: soft, non-tender, non-distended, normal bowel

 sounds


Female genitourinary: Present: normal





- Integumentary


Integumentary: Present: clear, dry, decreased turgor





- Musculoskeletal


Musculoskeletal: right sided weakness





- Psychiatric


Psychiatric: no appropriate mood/affect, no intact judgment & insight, no memory

 intact





- Neurologic


Neurologic: no CNII-XII intact, focal deficits, no moves all extremities, no 

gait normal





Results





- Labs


CBC & Chem 7: 


                                 02/28/19 13:20





                                 02/28/19 13:20


Labs: 


                              Abnormal lab results











  02/28/19 02/28/19 02/28/19 Range/Units





  13:20 13:20 13:20 


 


RBC  5.62 H    (3.65-5.03)  M/mm3


 


Hgb  16.3 H    (10.1-14.3)  gm/dl


 


Hct  48.3 H    (30.3-42.9)  %


 


Lymph % (Auto)  6.8 L    (13.4-35.0)  %


 


Lymph #  0.6 L    (1.2-5.4)  K/mm3


 


Seg Neutrophils %  88.7 H    (40.0-70.0)  %


 


Seg Neutrophils #  8.1 H    (1.8-7.7)  K/mm3


 


APTT   20.1 L   (24.2-36.6)  Sec.


 


Chloride    96.9 L  ()  mmol/L


 


BUN    24 H  (7-17)  mg/dL


 


Glucose    163 H  ()  mg/dL














Assessment and Plan





- Patient Problems


(1) CVA (cerebral vascular accident)


Current Visit: Yes   Status: Acute   


Qualifiers: 


   Precerebral and cerebral artery: middle cerebral artery   Laterality of 

affected vessel: left 


Plan to address problem: 


Admit to ICU, S/P TPA, Neurology consulted, neuro checks, CT head, MRI Brain, 

MRA Brain, Echo, Carotid doppler, statin therapy, lipid panel, PT/OT/Speech 

Therapy. Teleneurology consulted in ED





The high probability of a clinically significant, sudden or life threatening 

deterioration of the [neuro, respiratory] system(s) required my full and direct 

attention, intervention and personal management. The aggregate critical care 

time was [65] minutes. This time is in addition to time spent performing 

reported procedures but includes the following: 





[x] Data Review and interpretation 





[x] Patient assessment and monitoring of vital signs 





[x] Documentation 





[x] Medication orders and management








(2) HTN (hypertension)


Current Visit: Yes   Status: Acute   


Qualifiers: 


   Hypertension type: essential hypertension   Qualified Code(s): I10 - 

Essential (primary) hypertension   


Plan to address problem: 


monitor bp q shift, permissive htn overnight. 








(3) Heart failure


Current Visit: Yes   Status: Acute   


Qualifiers: 


   Heart failure chronicity: chronic 


Plan to address problem: 


strict I/O, daily weight, afterload reduction, 








(4) COPD (chronic obstructive pulmonary disease)


Current Visit: Yes   Status: Acute   


Qualifiers: 


   COPD type: chronic bronchitis 


Plan to address problem: 


supplemental oxygen, nebulizer therapy, chest x ray, pulse oximetry.








(5) DVT prophylaxis


Current Visit: Yes   Status: Acute   


Plan to address problem: 


SCD to ble while in bed.

## 2019-02-28 NOTE — XRAY REPORT
PROCEDURE: XR CHEST 1V AP 

 

TECHNIQUE:  Single frontal view of the chest 

 

HISTORY: SOB 

 

COMPARISONS: None.  

 

FINDINGS: 

 

The cardiomediastinal silhouette is normal in appearance. 

 

There is blunting of the left costophrenic angle. There is no focal consolidation. 

 

No acute bony or soft tissue abnormality. 

 

IMPRESSION:  

 

Blunting of the left costophrenic angle that may represent a small pleural effusion. 

 

This document is electronically signed by Sneha Raza MD., February 28 2019 04:37:41 PM ET

## 2019-02-28 NOTE — CAT SCAN REPORT
PROCEDURE: CT ANGIO HEAD 

 

TECHNIQUE:  Following administration of IV contrast axial helical imaging was performed through the b
rain with sagittal and coronal reformatted images and maximum intensity projection images obtained. 

 

HISTORY: CVA 

 

COMPARISONS: CT angiogram neck also performed today  

 

FINDINGS:  

There is normal enhancement of the major intracranial arteries without evidence of occlusion or aneur
ysm and no definite evidence of hemodynamically significant stenosis. However, segments of stenoses c
ould be missed due to technique. 

The predominant supply to the posterior cerebral arteries bilaterally is from the internal carotid ar
teries via the posterior communicating arteries (normal variant). 

There is atherosclerotic vascular calcification of the cavernous and supraclinoid segments of the int
ernal carotid arteries 

There is normal enhancement of the major intracranial venous structures. 

 

IMPRESSION: 

1. No evidence of occlusion or aneurysm and no definite evidence of hemodynamically significant steno
sis of the major intracranial arteries. However, segments of stenoses could be missed due to techniqu
e. 

 

This document is electronically signed by Isi Ash MD., February 28 2019 06:07:06 PM ET

## 2019-02-28 NOTE — EMERGENCY DEPARTMENT REPORT
HPI





- General


Time Seen by Provider: 02/28/19 13:10





- HPI


HPI: 





68-year-old  female presents to the emergency department via EMS from 

her pulmonologist office as a code stroke.  Apparently, at 12:29 PM, the patient

had some acute right-sided weakness and decreased responsiveness.  She has a 

past medical history of hypertension and COPD.  She last smoked in 2012.  No pre

vious history of any CVA.  The patient is currently a poor historian secondary 

to her acute medical condition.  She received some albuterol in route with EMS 

secondary to her wheezing.  She doesn't having a few days of wheezing and 

shortness of breath, which is what brought her to the pulmonologist office.  

Some of this information was obtained through EMS and from previous records at 

this hospital.





ED Past Medical Hx





- Past Medical History


Hx Hypertension: Yes


Hx Congestive Heart Failure: Yes


Hx COPD: Yes





- Social History


Smoking Status: Former Smoker





- Medications


Home Medications: 


                                Home Medications











 Medication  Instructions  Recorded  Confirmed  Last Taken  Type


 


Furosemide [Lasix TAB] 40 mg PO DAILY 02/23/17 02/28/19 02/23/17 History


 


Potassium Chloride [K-Dur] 10 meq PO DAILY 02/23/17 02/28/19 02/23/17 History


 


Albuterol Sulfate [Ventolin HFA] 1 puff IH PRN PRN 02/28/19 02/28/19 Unknown 

History


 


Atenolol [Tenormin] 100 mg PO DAILY 02/28/19 02/28/19 Unknown History


 


Lisinopril [Zestril] 40 mg PO DAILY 02/28/19 02/28/19 Unknown History


 


Loratadine [Claritin] 10 mg PO DAILY 02/28/19 02/28/19 Unknown History


 


Omeprazole 40 mg PO DAILY 02/28/19 02/28/19 Unknown History


 


levoFLOXacin [Levaquin] 750 mg PO DAILY 02/28/19 02/28/19 Unknown History


 


predniSONE [Deltasone] 60 mg PO DAILY 02/28/19 02/28/19 Unknown History














ED Review of Systems


ROS: 


Stated complaint: STROKE


Other details as noted in HPI





Comment: Unobtainable due to pts medical conditions





Physical Exam





- Physical Exam


Physical Exam: 





GENERAL: The patient is well-developed well-nourished.


HEENT: Normocephalic.  Atraumatic.   Patient has moist mucous membranes.


EYES:  Extraocular motions are intact.  Pupils are equal and reactive to light 

bilaterally.


NECK: Supple.  Trachea is midline.


CHEST/LUNGS: Clear to auscultation.  There is no respiratory distress noted.  


HEART/CARDIOVASCULAR: Regular.  There is no tachycardia.  There is no obvious 

murmur.


ABDOMEN: Abdomen is soft, nontender.  Patient has normal bowel sounds.  There is

 no abdominal distention.


SKIN: Skin is warm and dry.


NEURO: Patient is awake but otherwise not following commands.  She is nonverbal.

  Patient has right-sided facial droop.  She has right-sided hemiparesis with 

some left lower extremity weakness.


MUSCULOSKELETAL: There is no tenderness or deformity.  There is no evidence of 

acute injury.





ED Course





- Consultations


Consultation #1: 





02/28/19 13:30


I spoke with the telemedicine neurologist, Dr Fox, who will beam into the 

monitor for evaluation of this patient for CVA and possible TPA.


02/28/19 13:53


Patient has been seen by Dr. Fox.  The family is at bedside.  The decision 

to give TPA has been made by the family, neurologist and myself.  Family is 

aware of the risks and benefits of TPA administration.  The patient will have a 

CT angiography of the head and neck after the bolus of TPA has been given.





ED Medical Decision Making





- Lab Data


Result diagrams: 


                                 02/28/19 13:20





                                 02/28/19 13:20





- EKG Data


-: EKG Interpreted by Me


EKG shows normal: sinus rhythm, axis, intervals (prolonged QT and QTC 

intervals), QRS complexes (Q waves to the septal leads), ST-T waves


Rate: normal





- EKG Data


When compared to previous EKG there are: previous EKG unavailable


Interpretation: other (sinus rhythm, Q waves to the septal leads, prolonged QT 

and QTC intervals)





- Radiology Data


Radiology results: report reviewed





CT HEAD WITHOUT CONTRAST: 





HISTORY: Stroke symptoms. 





TECHNIQUE: Sequential 2.5mm CT images. 





COMPARISON: 2/23/17. 





FINDINGS: 





Cerebral Parenchyma: Mild nonspecific chronic white matter changes are 


again noted. The remaining brain parenchyma demonstrates normal 


attenuation. 





Cerebellum: Within normal limits. 





Brainstem: Within normal limits. 





Ventricles: Normal. 





Sella: Normal. 





Extra-axial spaces: Normal. 





Basal Cisterns: Normal. 





Intracranial Hemorrhage: None. 





Midline Shift: None. 





Calvarium: Normal. 





Sinuses: Normal. 





Mastoid Air Cells: Normal. 





Visualized Orbits: Normal. 











IMPRESSION: 


Nonspecific chronic white matter changes most likely representing 


chronic microangiopathy. 


No acute intracranial process is identified. 





These findings were discussed with Dr. Mejia in the emergency 


department at 1318 hrs. 





Transcribed By: TTR 


Dictated By: RAINA COOPER JR, MD 


Electronically Authenticated By: RAINA COOPER JR, MD 


Signed Date/Time: 02/28/19 1320 








PROCEDURE: CT ANGIO NECK 





TECHNIQUE: Following administration of IV contrast axial helical imaging was 

performed through the


neck with sagittal and coronal reformatted images and maximum intensity 

projection images obtained. 





HISTORY: CVA 





COMPARISONS: CT angiogram brain also performed today 





FINDINGS: 


There is normal enhancement of the cervical carotid and vertebral arteries 

without evidence of 


occlusion, aneurysm or dissection. 


There is approximately 60% stenosis of the proximal left internal carotid artery

. 


The right internal carotid artery is tortuous. 


There is no other evidence of stenosis. 


There is atherosclerotic vascular calcification at the carotid bifurcation 

bilaterally. 


There is normal enhancement of the major cervical venous structures. 


The bony structures are notable for cervical spondylosis. 


The visualized portions of the lungs are notable for the appearance of pulmonary

 emphysema. 





IMPRESSION: 


1. Approximately 60% stenosis proximal left internal carotid artery. 


2. Atherosclerotic vascular calcification carotid bifurcation bilaterally. 


3. Cervical spondylosis. 


4. Pulmonary emphysema. 





This document is electronically signed by Isi Ash MD., February 28 2019 06:45:50 PM ET 





Transcribed By: ED 


Dictated By: ISI ASH MD 


Electronically Authenticated By: ISI ASH MD 


Signed Date/Time: 02/28/19 9047 











PROCEDURE: CT ANGIO HEAD 





TECHNIQUE: Following administration of IV contrast axial helical imaging was 

performed through the


brain with sagittal and coronal reformatted images and maximum intensity 

projection images obtained.








HISTORY: CVA 





COMPARISONS: CT angiogram neck also performed today 





FINDINGS: 


There is normal enhancement of the major intracranial arteries without evidence 

of occlusion or 


aneurysm and no definite evidence of hemodynamically significant stenosis. 

However, segments of 


stenoses could be missed due to technique. 


The predominant supply to the posterior cerebral arteries bilaterally is from 

the internal carotid 


arteries via the posterior communicating arteries (normal variant). 


There is atherosclerotic vascular calcification of the cavernous and 

supraclinoid segments of the 


internal carotid arteries 


There is normal enhancement of the major intracranial venous structures. 





IMPRESSION: 


1. No evidence of occlusion or aneurysm and no definite evidence of 

hemodynamically significant 


stenosis of the major intracranial arteries. However, segments of stenoses could

 be missed due to 


technique. 





This document is electronically signed by Isi Ash MD., February 28 2019 06:07:06 PM ET 





Transcribed By: ED 


Dictated By: ISI ASH MD 


Electronically Authenticated By: ISI ASH MD 


Signed Date/Time: 02/28/19 1809 








- Medical Decision Making


Patient went to the pulmonologist today secondary to some bronchospasm and/or 

possible COPD exacerbation.  While there the patient started having strokelike 

symptoms including right-sided weakness, aphasia, a right-sided facial droop. 

She has a NIHSS of 22. CT head did not show any bleed, shift, mass, ischemia or 

any acute process. The patient was within the TPA window without any obvious 

contraindications. She was evaluated by myself, the telemedicine neurologist and

 after discussion with the family regarding risks vs benefits, TPA was 

administered.  After the TPA was given, the patient went for a CT angiography of

 the head and neck.  The patient was reevaluated after the CT angiography 

imaging and appeared to show some improvement.  She began moving all her 

extremities and speaking to family.  The CT angiography imaging was read by 

neurosurgery/stroke attending did not see any obvious signs of a large vessel 

occlusion.  About this time, the patient started having some regression back to 

her original strokelike symptoms.  Therefore, the patient was sent for another C

T scan of the head without contrast to make sure that there was no hemorrhagic 

conversion but there does not appear to be any acute bleed.  Patient's labs have

 been unremarkable and do not show any etiology for symptoms.  She was given 

some breathing treatments earlier for the bronchospasm.  Patient will be 

admitted to the hospital for MRI, carotid ultrasounds, further evaluation and 

treatment and the patient was accepted for admission by the hospitalist, Dr. Montilla.





Critical Care Time: Yes


Critical care time in (mins) excluding proc time.: 75


Critical care attestation.: 


If time is entered above; I have spent that time in minutes in the direct care 

of this critically ill patient, excluding procedure time. Critical care time was

 spent on this patient in doing her initial evaluation, multiple re-evaluations,

 ordering and interpretation of labs and imaging, TPA administration, discussion

 with the patient and her family, consult with telemedicine neurology. 





Critical Care Time: 





75 minutes





ED Disposition


Clinical Impression: 


CVA (cerebral vascular accident)


Qualifiers:


 Precerebral and cerebral artery: middle cerebral artery Laterality of affected 

vessel: left 





HTN (hypertension)


Qualifiers:


 Hypertension type: essential hypertension Qualified Code(s): I10 - Essential 

(primary) hypertension





COPD (chronic obstructive pulmonary disease)


Qualifiers:


 COPD type: chronic bronchitis 





Disposition: DC-09 OP ADMIT IP TO THIS HOSP


Is pt being admited?: Yes


Condition: Serious


Time of Disposition: 20:26





- Assessment


Assessment Interval: Baseline





- Level of Consciousness


1a. Level of Consciousness: alert/keenly responsive





- LOC Questions


1b. LOC Questions: aphasic





- LOC Command


1c. LOC Commands: performs no tasks correctly





- Best Gaze


2. Best Gaze: normal





- Visual


3. Visual: no visual loss





- Facial Palsy


4. Facial Palsy: partial paralysis





- Motor Arm


5b. Motor Arm Right: some gravity effort


5a. Motor Arm Left: some gravity effort





- Motor Leg


6b. Motor Leg Right: no movement


6a. Motor Leg Left: drift





- Limb Ataxia


7. Limb Ataxia: absent





- Sensory


8. Sensory: normal





- Best Language


9. Best Language: mute/global aphasia





- Dysarthria


10. Dysarthria: mute/anarrthric





- Extinction and Inattention


11. Extinction/Inattention: profound inattention





- Scoring


Total Score: 22


Stroke Severity: Severe Stroke

## 2019-03-01 LAB — HDLC SERPL-MCNC: 30 MG/DL (ref 40–59)

## 2019-03-01 RX ADMIN — IPRATROPIUM BROMIDE AND ALBUTEROL SULFATE SCH AMPUL: .5; 3 SOLUTION RESPIRATORY (INHALATION) at 16:23

## 2019-03-01 RX ADMIN — IPRATROPIUM BROMIDE AND ALBUTEROL SULFATE SCH AMPUL: .5; 3 SOLUTION RESPIRATORY (INHALATION) at 19:27

## 2019-03-01 RX ADMIN — IPRATROPIUM BROMIDE AND ALBUTEROL SULFATE SCH AMPUL: .5; 3 SOLUTION RESPIRATORY (INHALATION) at 07:45

## 2019-03-01 NOTE — CAT SCAN REPORT
PROCEDURE:  CT HEAD/BRAIN WO CON 

 

TECHNIQUE:  Computerized tomography of the head was performed without contrast material.  

 

HISTORY: follow up post TPA rule out hemorrhage 

 

FINDINGS: Unenhanced CT of the brain was performed and compared to the prior head CT of February 28 a
s well as to the MRI examination of March 1. 

 

These images demonstrate no acute intracranial hemorrhage, extra-axial fluid collection, midline shif
t or mass effect. The ventricles and basal cisterns are not effaced. 

 

The patient's left basal ganglia and left periventricular white matter infarct is now seen on CT. It 
was not previously apparent on the prior CT examination, but is not significantly changed from the MR
I examination measuring approximately 2.5 x 1.9 cm. The very small left cerebellar infarcts also note
d on MRI are not seen on CT. 

 

There is an old right basilar lacunar infarct. 

 

The right mastoid air cells appear clear. There is partial opacification left mastoid air cells. Ther
e is an air-fluid level in the left maxillary sinus consistent with acute sinusitis. 

 

IMPRESSION: No acute intracranial hemorrhage 

Subacute left basal ganglia and left periventricular white matter infarct, as depicted on MRI of bruce
ier in the day 

 

This document is electronically signed by Sam Suárez MD., March 1 2019 07:53:01 PM ET

## 2019-03-01 NOTE — VASCULAR LAB REPORT
PROCEDURE:  VL CAROTID DUPLEX BILAT 

 

TECHNIQUE:  Duplex Doppler ultrasound of the common, internal and external carotid arteries and the v
ertebral arteries was performed bilaterally. Gray scale imaging, velocity spectral waveform analysis,
 and color flow Doppler were employed.  

 

HISTORY: stroke 

 

COMPARISONS:  None . 

 

Note: Measurement of carotid stenosis is based on flow velocity values that correlate with the [place] 
[place] Symptomatic Carotid Endarterectomy Trial (NASCET) based stenosis criteria using the internal
 carotid artery diameter as the denominator for stenosis calculation. 

 

FINDINGS:  

 

RIGHT carotid artery: 

Velocities:  ICA PSV: 115 cm/sec  ICA End diastolic: 34 cm/sec  CCA PSV: 51 cm/sec   IC/CC ratio: 2.2
5     

Plaque: Mild heterogeneous plaque noted. 

 

RIGHT vertebral artery:  Antegrade systolic and diastolic flow 

 

LEFT carotid artery: 

Velocities:  ICA PSV: 85 cm/sec  ICA End diastolic: 48 cm/sec  CCA PSV: 60 cm/sec   IC/CC ratio: 1.4.
     

Plaque:  Mild degree of calcified plaque is noted. 

 

LEFT vertebral artery:  Antegrade systolic and diastolic flow 

 

IMPRESSION:   

 

1.  RIGHT carotid:  50-69% internal carotid artery stenosis. 

2.  LEFT carotid:  <50% internal carotid arterial stenosis. 

3.  Vertebral arteries:  Bilaterally antegrade. 

 

 

 

This document is electronically signed by Justino Weaver MD., March 1 2019 08:30:54 PM ET

## 2019-03-01 NOTE — PROGRESS NOTE
Assessment and Plan





This is a 67 YO F with acute onset of right sided weakness, treated with 

Alteplase, looks like L MCA stroke.  Also L ICA stenosis


REcommend:


Urgent follow up CT head to rule out hemorrhage then start antiplatelet therapy.

 high intensity statin once taking PO, LDL > 100


MRI Brain, CTA head, neck noted, echo all reviewed, A1C pending


PT/OT/ST


VTE prophylaxis, ok for heparin/lovenox once CT reviewed


No anticoagulation or antiplatelet therapy until pt is cleared with 24 hr CT 

head


Will need continued surveillance with vascular surgery outpatient for ICA 

stenosis


COntinue care for all medical issues as you are doing


POC discussed with pt and family at bedside.








Subjective


Date of service: 03/01/19


Interval history: 





Pt is about the same as yesterday.  Might be moving the right side a little 

more.  Continues with expressive aphasia, might have a little receptive aphasia 

as well.





Objective





- Vital Sign


                               Vital Signs - 12hr











  03/01/19 03/01/19 03/01/19





  05:41 05:51 06:01


 


Temperature   


 


Pulse Rate 79 79 72


 


Pulse Rate [   





Anterior   





Bilateral   





Throughout]   


 


Pulse Rate [   





Left Arm]   


 


Respiratory 24 23 21





Rate   


 


Respiratory   





Rate [Anterior   





Bilateral   





Throughout]   


 


Respiratory   





Rate [Left Arm]   


 


Blood Pressure 159/91 159/91 159/91


 


Blood Pressure   





[Left Arm]   


 


O2 Sat by Pulse 96 96 97





Oximetry   


 


O2 Sat by Pulse   





Oximetry [Left   





Arm]   














  03/01/19 03/01/19 03/01/19





  06:03 06:11 06:21


 


Temperature   


 


Pulse Rate  78 73


 


Pulse Rate [   





Anterior   





Bilateral   





Throughout]   


 


Pulse Rate [   





Left Arm]   


 


Respiratory  19 21





Rate   


 


Respiratory   





Rate [Anterior   





Bilateral   





Throughout]   


 


Respiratory   





Rate [Left Arm]   


 


Blood Pressure  159/91 159/91


 


Blood Pressure 166/86  





[Left Arm]   


 


O2 Sat by Pulse  96 96





Oximetry   


 


O2 Sat by Pulse   





Oximetry [Left   





Arm]   














  03/01/19 03/01/19 03/01/19





  06:31 06:41 06:51


 


Temperature   


 


Pulse Rate 74 74 65


 


Pulse Rate [   





Anterior   





Bilateral   





Throughout]   


 


Pulse Rate [   





Left Arm]   


 


Respiratory 21 19 19





Rate   


 


Respiratory   





Rate [Anterior   





Bilateral   





Throughout]   


 


Respiratory   





Rate [Left Arm]   


 


Blood Pressure 166/86 166/86 166/86


 


Blood Pressure   





[Left Arm]   


 


O2 Sat by Pulse 97 97 96





Oximetry   


 


O2 Sat by Pulse   





Oximetry [Left   





Arm]   














  03/01/19 03/01/19 03/01/19





  07:00 07:01 07:11


 


Temperature   


 


Pulse Rate  65 76


 


Pulse Rate [   





Anterior   





Bilateral   





Throughout]   


 


Pulse Rate [ 68  





Left Arm]   


 


Respiratory  20 20





Rate   


 


Respiratory   





Rate [Anterior   





Bilateral   





Throughout]   


 


Respiratory 14  





Rate [Left Arm]   


 


Blood Pressure  166/86 166/86


 


Blood Pressure 166/85  





[Left Arm]   


 


O2 Sat by Pulse  97 97





Oximetry   


 


O2 Sat by Pulse 97  





Oximetry [Left   





Arm]   














  03/01/19 03/01/19 03/01/19





  07:21 07:31 07:41


 


Temperature   


 


Pulse Rate 65 68 65


 


Pulse Rate [   





Anterior   





Bilateral   





Throughout]   


 


Pulse Rate [   





Left Arm]   


 


Respiratory 20 13 19





Rate   


 


Respiratory   





Rate [Anterior   





Bilateral   





Throughout]   


 


Respiratory   





Rate [Left Arm]   


 


Blood Pressure 166/86 166/86 186/78


 


Blood Pressure   





[Left Arm]   


 


O2 Sat by Pulse 97 95 98





Oximetry   


 


O2 Sat by Pulse   





Oximetry [Left   





Arm]   














  03/01/19 03/01/19 03/01/19





  07:47 07:51 08:00


 


Temperature   


 


Pulse Rate  73 


 


Pulse Rate [ 71  77





Anterior   





Bilateral   





Throughout]   


 


Pulse Rate [   67





Left Arm]   


 


Respiratory  21 20





Rate   


 


Respiratory 19  20





Rate [Anterior   





Bilateral   





Throughout]   


 


Respiratory   19





Rate [Left Arm]   


 


Blood Pressure  186/78 


 


Blood Pressure   186/78





[Left Arm]   


 


O2 Sat by Pulse 95 98 96





Oximetry   


 


O2 Sat by Pulse   95





Oximetry [Left   





Arm]   














  03/01/19 03/01/19 03/01/19





  08:01 08:07 08:11


 


Temperature  98.3 F 


 


Pulse Rate 72  79


 


Pulse Rate [   





Anterior   





Bilateral   





Throughout]   


 


Pulse Rate [   





Left Arm]   


 


Respiratory 20  20





Rate   


 


Respiratory   





Rate [Anterior   





Bilateral   





Throughout]   


 


Respiratory   





Rate [Left Arm]   


 


Blood Pressure 178/73  178/73


 


Blood Pressure   





[Left Arm]   


 


O2 Sat by Pulse 95  97





Oximetry   


 


O2 Sat by Pulse   





Oximetry [Left   





Arm]   














  03/01/19 03/01/19 03/01/19





  08:21 08:31 08:41


 


Temperature   


 


Pulse Rate 82 79 76


 


Pulse Rate [   





Anterior   





Bilateral   





Throughout]   


 


Pulse Rate [   





Left Arm]   


 


Respiratory 19 21 16





Rate   


 


Respiratory   





Rate [Anterior   





Bilateral   





Throughout]   


 


Respiratory   





Rate [Left Arm]   


 


Blood Pressure 178/73 178/73 178/73


 


Blood Pressure   





[Left Arm]   


 


O2 Sat by Pulse 96 95 96





Oximetry   


 


O2 Sat by Pulse   





Oximetry [Left   





Arm]   














  03/01/19 03/01/19 03/01/19





  08:51 09:00 09:11


 


Temperature   


 


Pulse Rate 81 78 87


 


Pulse Rate [   





Anterior   





Bilateral   





Throughout]   


 


Pulse Rate [  83 





Left Arm]   


 


Respiratory 20 14 23





Rate   


 


Respiratory   





Rate [Anterior   





Bilateral   





Throughout]   


 


Respiratory  20 





Rate [Left Arm]   


 


Blood Pressure 178/73 178/73 170/72


 


Blood Pressure  170/72 





[Left Arm]   


 


O2 Sat by Pulse 97 96 96





Oximetry   


 


O2 Sat by Pulse  95 





Oximetry [Left   





Arm]   














  03/01/19 03/01/19 03/01/19





  09:21 09:31 09:41


 


Temperature   


 


Pulse Rate 81 81 80


 


Pulse Rate [   





Anterior   





Bilateral   





Throughout]   


 


Pulse Rate [   





Left Arm]   


 


Respiratory 18 20 24





Rate   


 


Respiratory   





Rate [Anterior   





Bilateral   





Throughout]   


 


Respiratory   





Rate [Left Arm]   


 


Blood Pressure 170/72 170/72 170/72


 


Blood Pressure   





[Left Arm]   


 


O2 Sat by Pulse 97 96 94





Oximetry   


 


O2 Sat by Pulse   





Oximetry [Left   





Arm]   














  03/01/19 03/01/19 03/01/19





  09:51 10:00 10:01


 


Temperature   


 


Pulse Rate 79 89 87


 


Pulse Rate [   





Anterior   





Bilateral   





Throughout]   


 


Pulse Rate [  86 





Left Arm]   


 


Respiratory 23  25 H





Rate   


 


Respiratory   





Rate [Anterior   





Bilateral   





Throughout]   


 


Respiratory  23 





Rate [Left Arm]   


 


Blood Pressure 170/72  182/90


 


Blood Pressure  182/90 





[Left Arm]   


 


O2 Sat by Pulse 95  93





Oximetry   


 


O2 Sat by Pulse  98 





Oximetry [Left   





Arm]   














  03/01/19 03/01/19 03/01/19





  10:11 10:21 10:31


 


Temperature   


 


Pulse Rate 78 82 81


 


Pulse Rate [   





Anterior   





Bilateral   





Throughout]   


 


Pulse Rate [   





Left Arm]   


 


Respiratory 24 23 24





Rate   


 


Respiratory   





Rate [Anterior   





Bilateral   





Throughout]   


 


Respiratory   





Rate [Left Arm]   


 


Blood Pressure 182/90 182/90 182/90


 


Blood Pressure   





[Left Arm]   


 


O2 Sat by Pulse 95 96 95





Oximetry   


 


O2 Sat by Pulse   





Oximetry [Left   





Arm]   














  03/01/19 03/01/19 03/01/19





  10:41 10:50 11:00


 


Temperature   


 


Pulse Rate 83 83 


 


Pulse Rate [   





Anterior   





Bilateral   





Throughout]   


 


Pulse Rate [   84





Left Arm]   


 


Respiratory 23 21 





Rate   


 


Respiratory   





Rate [Anterior   





Bilateral   





Throughout]   


 


Respiratory   22





Rate [Left Arm]   


 


Blood Pressure 182/90 182/90 


 


Blood Pressure   198/95





[Left Arm]   


 


O2 Sat by Pulse 96 96 





Oximetry   


 


O2 Sat by Pulse   95





Oximetry [Left   





Arm]   














  03/01/19 03/01/19 03/01/19





  11:01 11:11 11:21


 


Temperature   


 


Pulse Rate 82 86 79


 


Pulse Rate [   





Anterior   





Bilateral   





Throughout]   


 


Pulse Rate [   





Left Arm]   


 


Respiratory 21 23 23





Rate   


 


Respiratory   





Rate [Anterior   





Bilateral   





Throughout]   


 


Respiratory   





Rate [Left Arm]   


 


Blood Pressure 194/94 182/90 182/90


 


Blood Pressure   





[Left Arm]   


 


O2 Sat by Pulse 94 96 96





Oximetry   


 


O2 Sat by Pulse   





Oximetry [Left   





Arm]   














  03/01/19 03/01/19 03/01/19





  11:31 11:41 11:51


 


Temperature   


 


Pulse Rate 85 87 82


 


Pulse Rate [   





Anterior   





Bilateral   





Throughout]   


 


Pulse Rate [   





Left Arm]   


 


Respiratory 21 26 H 17





Rate   


 


Respiratory   





Rate [Anterior   





Bilateral   





Throughout]   


 


Respiratory   





Rate [Left Arm]   


 


Blood Pressure 198/95 198/95 198/95


 


Blood Pressure   





[Left Arm]   


 


O2 Sat by Pulse 95 96 96





Oximetry   


 


O2 Sat by Pulse   





Oximetry [Left   





Arm]   














  03/01/19 03/01/19 03/01/19





  12:00 12:01 12:11


 


Temperature   


 


Pulse Rate  83 81


 


Pulse Rate [   





Anterior   





Bilateral   





Throughout]   


 


Pulse Rate [ 81  





Left Arm]   


 


Respiratory  23 21





Rate   


 


Respiratory   





Rate [Anterior   





Bilateral   





Throughout]   


 


Respiratory 21  





Rate [Left Arm]   


 


Blood Pressure  171/83 171/83


 


Blood Pressure 171/83  





[Left Arm]   


 


O2 Sat by Pulse  95 97





Oximetry   


 


O2 Sat by Pulse 96  





Oximetry [Left   





Arm]   














  03/01/19 03/01/19 03/01/19





  12:13 12:21 13:00


 


Temperature 98.8 F  


 


Pulse Rate  79 


 


Pulse Rate [   





Anterior   





Bilateral   





Throughout]   


 


Pulse Rate [   93 H





Left Arm]   


 


Respiratory  21 





Rate   


 


Respiratory   





Rate [Anterior   





Bilateral   





Throughout]   


 


Respiratory   24





Rate [Left Arm]   


 


Blood Pressure  160/79 


 


Blood Pressure   160/79





[Left Arm]   


 


O2 Sat by Pulse  95 





Oximetry   


 


O2 Sat by Pulse   92





Oximetry [Left   





Arm]   














  03/01/19 03/01/19 03/01/19





  16:24 16:27 16:34


 


Temperature   


 


Pulse Rate   


 


Pulse Rate [ 90  89





Anterior   





Bilateral   





Throughout]   


 


Pulse Rate [   





Left Arm]   


 


Respiratory   





Rate   


 


Respiratory 23  23





Rate [Anterior   





Bilateral   





Throughout]   


 


Respiratory   





Rate [Left Arm]   


 


Blood Pressure   


 


Blood Pressure   





[Left Arm]   


 


O2 Sat by Pulse  97 





Oximetry   


 


O2 Sat by Pulse   





Oximetry [Left   





Arm]   














- General Apperance


Constitutional: comfortable





- EENT


EENT: PERRL, mucous membranes dry





- Respiratory


Respiratory: lungs clear





- Cardiovascular


Cardiovascular: regular rate


Extremities: no peripheral edema bilat





- Gastrointestinal


Gastrointestinal: normoactive bowel sounds





- Neurologic


Cranial nerve examination: PERRL, EOMI, tongue midline, facial droop


Motor examination - right side: 3/5: biceps, triceps, wrist flexion, wrist 

extension, , hip flexors, knee extensors, 4/5: dorsiflexion, toe extension 

(EHL), plantarflexion


Motor examination - left side: 4/5: biceps, triceps, wrist flexion, wrist 

extension, , 5/5: hip flexors, knee extensors, dorsiflexion, toe extension 

(EHL), plantarflexion


Detailed sensory examination: light touch


Reflexes: 1+: ankle, bicep, knee, tricep





- Laboratory Findings


CBC and BMP: 


                                 02/28/19 13:20





                                 02/28/19 13:20


Abnormal Lab Findings: 


                                  Abnormal Labs











  02/28/19 02/28/19 02/28/19





  13:20 13:20 13:20


 


RBC  5.62 H  


 


Hgb  16.3 H  


 


Hct  48.3 H  


 


Lymph % (Auto)  6.8 L  


 


Lymph #  0.6 L  


 


Seg Neutrophils %  88.7 H  


 


Seg Neutrophils #  8.1 H  


 


APTT   20.1 L 


 


Chloride    96.9 L


 


BUN    24 H


 


Glucose    163 H


 


Triglycerides   


 


LDL Cholesterol Direct   


 


HDL Cholesterol   














  03/01/19





  05:30


 


RBC 


 


Hgb 


 


Hct 


 


Lymph % (Auto) 


 


Lymph # 


 


Seg Neutrophils % 


 


Seg Neutrophils # 


 


APTT 


 


Chloride 


 


BUN 


 


Glucose 


 


Triglycerides  277 H


 


LDL Cholesterol Direct  137 H


 


HDL Cholesterol  30 L














- Diagnostic Findings


Additional findings: 





MRI official report pending, personally reviewed, left MCA stroke, primarily 

subcortical


echo- ? bubble, good EF no thrombus


head CTA- no LVO


Neck CTA- 60% L ICA stenosis

## 2019-03-02 LAB
BASOPHILS # (AUTO): 0 K/MM3 (ref 0–0.1)
BASOPHILS NFR BLD AUTO: 0.2 % (ref 0–1.8)
BUN SERPL-MCNC: 20 MG/DL (ref 7–17)
BUN/CREAT SERPL: 29 %
CALCIUM SERPL-MCNC: 8.1 MG/DL (ref 8.4–10.2)
EOSINOPHIL # BLD AUTO: 0 K/MM3 (ref 0–0.4)
EOSINOPHIL NFR BLD AUTO: 0.5 % (ref 0–4.3)
HCT VFR BLD CALC: 49.1 % (ref 30.3–42.9)
HEMOLYSIS INDEX: 64
HGB BLD-MCNC: 16.7 GM/DL (ref 10.1–14.3)
LYMPHOCYTES # BLD AUTO: 0.6 K/MM3 (ref 1.2–5.4)
LYMPHOCYTES NFR BLD AUTO: 6.4 % (ref 13.4–35)
MCHC RBC AUTO-ENTMCNC: 34 % (ref 30–34)
MCV RBC AUTO: 86 FL (ref 79–97)
MONOCYTES # (AUTO): 0.7 K/MM3 (ref 0–0.8)
MONOCYTES % (AUTO): 7.8 % (ref 0–7.3)
PLATELET # BLD: 169 K/MM3 (ref 140–440)
RBC # BLD AUTO: 5.68 M/MM3 (ref 3.65–5.03)

## 2019-03-02 PROCEDURE — 5A09457 ASSISTANCE WITH RESPIRATORY VENTILATION, 24-96 CONSECUTIVE HOURS, CONTINUOUS POSITIVE AIRWAY PRESSURE: ICD-10-PCS | Performed by: INTERNAL MEDICINE

## 2019-03-02 PROCEDURE — 4A033R1 MEASUREMENT OF ARTERIAL SATURATION, PERIPHERAL, PERCUTANEOUS APPROACH: ICD-10-PCS | Performed by: INTERNAL MEDICINE

## 2019-03-02 RX ADMIN — IPRATROPIUM BROMIDE AND ALBUTEROL SULFATE SCH AMPUL: .5; 3 SOLUTION RESPIRATORY (INHALATION) at 19:40

## 2019-03-02 RX ADMIN — IPRATROPIUM BROMIDE AND ALBUTEROL SULFATE SCH AMPUL: .5; 3 SOLUTION RESPIRATORY (INHALATION) at 07:26

## 2019-03-02 RX ADMIN — ALBUTEROL SULFATE PRN MG: 2.5 SOLUTION RESPIRATORY (INHALATION) at 01:49

## 2019-03-02 RX ADMIN — ASPIRIN SCH: 300 SUPPOSITORY RECTAL at 02:51

## 2019-03-02 RX ADMIN — ASPIRIN SCH MG: 300 SUPPOSITORY RECTAL at 09:40

## 2019-03-02 RX ADMIN — IPRATROPIUM BROMIDE AND ALBUTEROL SULFATE SCH AMPUL: .5; 3 SOLUTION RESPIRATORY (INHALATION) at 14:27

## 2019-03-02 NOTE — EVENT NOTE
Date: 03/02/19





Patient admitted yesterday to ICU for Post TPA therapy.  Consult not placed so 

patient did not appear on my list but we did round on her yesterday.  Called 

last evening about respiratory distress, no desaturations.  Patient has 

diastolic dysfunction on echo and was hypertensive yesterday.  Gave lasix IV 20 

last night and responded well.  Will give an additional 40 today.  Patient is 

stable for transfer out of unit.  If any further pulmonary issues arise, please 

place consult and will be happy to see patient.

## 2019-03-02 NOTE — CAT SCAN REPORT
CT HEAD/BRAIN WO CON 

 

CLINICAL INDICATION: 

Female, 68 years of age. cva s/p tpa 

 

COMPARISON: 

CT of the head and MRI of the brain from yesterday. 

 

TECHNIQUE: 

Contiguous axial images were obtained from the vertex through the skull base.This CT exam was perform
ed using one or more of the following dose reduction techniques: automated exposure control, adjustme
nt of the mA and/or kV according to patient size, or use of iterative reconstruction technique.  

 

FINDINGS: 

Persistent focal area of low attenuation left basal ganglia corresponding to patient's acute or early
 subacute infarct. No acute intracranial hemorrhage. Stable configuration of the ventricular system a
nd mild chronic small vessel ischemic disease. Remainder of the gray-white differentiation maintained
. Prior cataract surgery. Calvarium is grossly intact. 

 

Small air-fluid level left maxillary sinus. Mastoid air cells are clear. 

 

IMPRESSION: 

 

 

1. Stable appearance of subacute left basal ganglia infarct. No acute intracranial hemorrhage. 

2. Stable mild chronic small vessel ischemic disease. 

 

  

 

This document is electronically signed by Genoveva Abad DO., March 2 2019 07:54:14 PM ET

## 2019-03-02 NOTE — PROGRESS NOTE
Assessment and Plan


Assessment and plan: 


Patient is a 67 yo woman with a history of Obesity, HTN, COPD, and CHF who 

presented with acute onset of right sided weakness, treated with Alteplase





-Acute Ischemic Stroke with Right hemiparesis and expressive aphasia s/p tPA: 

stroke protocol but hold oral statin due to speech/swallow impairment, use 

rectal ASA unless passes swallow evaluation, Neurology is following


-Accelerated HTN due to above: permissive for now, Neurology is following 


-Dyslipidemia: liptor


-Chronic Heart failure: strict I/O, daily weight, afterload reduction, 


-COPD (chronic obstructive pulmonary disease): supplemental oxygen as needed, 

nebulizer therapy prn





repeat CT head


passed bedside swallow today. start diet


transfer out of ICU





History


Interval history: 


Patient was seen and examined. Follow-up on current diagnosis of CVA. Overnight 

uneventful. Patient denies any chest pain, shortness breath, nausea/vomiting or 

severe headaches. Imaging, nursing note, chart, labs and old chart reviewed. 

Discussed with patient. 





Hospitalist Physical





- Physical exam


Narrative exam: 


Gen: WDWN, NAD, Awake, Alert, appears Orientated 


HEENT: NCAT, EOMI, PERRL, OP Clear 


Neck: supple, no adenopathy, no thyromegaly, no JVD 


CVS/Heart: RRR, normal S1S2, pulses present bilaterally 


Chest/Lungs: CTA B, Symmetrical chest expansion, good air entry bilaterally 


GI/Abdomen: soft, NTND, good bowel sounds, no guarding or rebound 


/Bladder: no suprapubic tenderness, no CVA or paraspinal tenderness 


Extermity/Skin: no c/c/e, no obvious rash 


MSK: FROM x 4, but weaker on right ext


Neuro: CN 2-12 grossly intact except speech, right sided hemiparesis, upper and 

lower


Psych: calm 








- Constitutional


Vitals: 


                                        











Temp Pulse Resp BP Pulse Ox


 


 98.8 F   112 H  21   161/92   95 


 


 03/02/19 03:08  03/02/19 10:01  03/02/19 10:01  03/02/19 10:01  03/02/19 10:01











General appearance: Present: obese





Results





- Labs


CBC & Chem 7: 


                                 03/02/19 02:00





                                 03/02/19 02:00


Labs: 


                             Laboratory Last Values











WBC  9.5 K/mm3 (4.5-11.0)   03/02/19  02:00    


 


RBC  5.68 M/mm3 (3.65-5.03)  H  03/02/19  02:00    


 


Hgb  16.7 gm/dl (10.1-14.3)  H  03/02/19  02:00    


 


Hct  49.1 % (30.3-42.9)  H  03/02/19  02:00    


 


MCV  86 fl (79-97)   03/02/19  02:00    


 


MCH  29 pg (28-32)   03/02/19  02:00    


 


MCHC  34 % (30-34)   03/02/19  02:00    


 


RDW  14.9 % (13.2-15.2)   03/02/19  02:00    


 


Plt Count  169 K/mm3 (140-440)   03/02/19  02:00    


 


Lymph % (Auto)  6.4 % (13.4-35.0)  L  03/02/19  02:00    


 


Mono % (Auto)  7.8 % (0.0-7.3)  H  03/02/19  02:00    


 


Eos % (Auto)  0.5 % (0.0-4.3)   03/02/19  02:00    


 


Baso % (Auto)  0.2 % (0.0-1.8)   03/02/19  02:00    


 


Lymph #  0.6 K/mm3 (1.2-5.4)  L  03/02/19  02:00    


 


Mono #  0.7 K/mm3 (0.0-0.8)   03/02/19  02:00    


 


Eos #  0.0 K/mm3 (0.0-0.4)   03/02/19  02:00    


 


Baso #  0.0 K/mm3 (0.0-0.1)   03/02/19  02:00    


 


Seg Neutrophils %  85.1 % (40.0-70.0)  H  03/02/19  02:00    


 


Seg Neutrophils #  8.1 K/mm3 (1.8-7.7)  H  03/02/19  02:00    


 


PT  13.4 Sec. (12.2-14.9)   02/28/19  13:20    


 


INR  0.96  (0.87-1.13)   02/28/19  13:20    


 


APTT  20.1 Sec. (24.2-36.6)  L  02/28/19  13:20    


 


Thrombin Time  19.6 Sec. (15.1-19.6)   02/28/19  13:20    


 


POC ABG pH  7.394  (7.35-7.45)   03/02/19  02:26    


 


POC ABG pCO2  46.6  (35-45)  H  03/02/19  02:26    


 


POC ABG pO2  90  ()   03/02/19  02:26    


 


POC ABG HCO3  28.5   03/02/19  02:26    


 


POC ABG Total CO2  30   03/02/19  02:26    


 


POC ABG O2 Sat  97   03/02/19  02:26    


 


POC ABG Base Excess  4   03/02/19  02:26    


 


FiO2  35 %  03/02/19  02:26    


 


Sodium  145 mmol/L (137-145)   03/02/19  02:00    


 


Potassium  3.7 mmol/L (3.6-5.0)   03/02/19  02:00    


 


Chloride  105.0 mmol/L ()   03/02/19  02:00    


 


Carbon Dioxide  27 mmol/L (22-30)   03/02/19  02:00    


 


Anion Gap  17 mmol/L  03/02/19  02:00    


 


BUN  20 mg/dL (7-17)  H  03/02/19  02:00    


 


Creatinine  0.7 mg/dL (0.7-1.2)   03/02/19  02:00    


 


Estimated GFR  > 60 ml/min  03/02/19  02:00    


 


BUN/Creatinine Ratio  29 %  03/02/19  02:00    


 


Glucose  128 mg/dL ()  H  03/02/19  02:00    


 


Hemoglobin A1c  6.7 % (4-6)  H  03/01/19  18:00    


 


Calcium  8.1 mg/dL (8.4-10.2)  L  03/02/19  02:00    


 


Total Bilirubin  0.60 mg/dL (0.1-1.2)   02/28/19  13:20    


 


AST  16 units/L (5-40)   02/28/19  13:20    


 


ALT  14 units/L (7-56)   02/28/19  13:20    


 


Alkaline Phosphatase  120 units/L ()   02/28/19  13:20    


 


Total Creatine Kinase  69 units/L ()   02/28/19  13:20    


 


CK-MB (CK-2)  1.9 ng/mL (0.0-4.0)   02/28/19  13:20    


 


CK-MB (CK-2) Rel Index  2.7  (0-4)   02/28/19  13:20    


 


Troponin T  < 0.010 ng/mL (0.00-0.029)   02/28/19  13:20    


 


Total Protein  7.0 g/dL (6.3-8.2)   02/28/19  13:20    


 


Albumin  4.4 g/dL (3.9-5)   02/28/19  13:20    


 


Albumin/Globulin Ratio  1.7 %  02/28/19  13:20    


 


Triglycerides  277 mg/dL (2-149)  H  03/01/19  05:30    


 


Cholesterol  193 mg/dL ()   03/01/19  05:30    


 


LDL Cholesterol Direct  137 mg/dL ()  H  03/01/19  05:30    


 


HDL Cholesterol  30 mg/dL (40-59)  L  03/01/19  05:30    


 


Cholesterol/HDL Ratio  6.43 %  03/01/19  05:30    


 


Urine Color  Straw  (Yellow)   02/28/19  15:41    


 


Urine Turbidity  Clear  (Clear)   02/28/19  15:41    


 


Urine pH  7.0  (5.0-7.0)   02/28/19  15:41    


 


Ur Specific Gravity  1.018  (1.003-1.030)   02/28/19  15:41    


 


Urine Protein  <15 mg/dl mg/dL (Negative)   02/28/19  15:41    


 


Urine Glucose (UA)  Neg mg/dL (Negative)   02/28/19  15:41    


 


Urine Ketones  Neg mg/dL (Negative)   02/28/19  15:41    


 


Urine Blood  Neg  (Negative)   02/28/19  15:41    


 


Urine Nitrite  Neg  (Negative)   02/28/19  15:41    


 


Urine Bilirubin  Neg  (Negative)   02/28/19  15:41    


 


Urine Urobilinogen  < 2.0 mg/dL (<2.0)   02/28/19  15:41    


 


Ur Leukocyte Esterase  Neg  (Negative)   02/28/19  15:41    


 


Urine WBC (Auto)  < 1.0 /HPF (0.0-6.0)   02/28/19  15:41    


 


Urine RBC (Auto)  1.0 /HPF (0.0-6.0)   02/28/19  15:41    


 


Urine Mucus  Few /HPF  02/28/19  15:41    


 


Urine Opiates Screen  Presumptive negative   02/28/19  15:41    


 


Urine Methadone Screen  Presumptive negative   02/28/19  15:41    


 


Ur Barbiturates Screen  Presumptive negative   02/28/19  15:41    


 


Ur Phencyclidine Scrn  Presumptive negative   02/28/19  15:41    


 


Ur Amphetamines Screen  Presumptive negative   02/28/19  15:41    


 


U Benzodiazepines Scrn  Presumptive negative   02/28/19  15:41    


 


Urine Cocaine Screen  Presumptive negative   02/28/19  15:41    


 


U Marijuana (THC) Screen  Presumptive negative   02/28/19  15:41    


 


Drugs of Abuse Note  Disclamer   02/28/19  15:41

## 2019-03-03 RX ADMIN — BISACODYL PRN MG: 10 SUPPOSITORY RECTAL at 15:48

## 2019-03-03 RX ADMIN — IPRATROPIUM BROMIDE AND ALBUTEROL SULFATE SCH AMPUL: .5; 3 SOLUTION RESPIRATORY (INHALATION) at 12:38

## 2019-03-03 RX ADMIN — IPRATROPIUM BROMIDE AND ALBUTEROL SULFATE SCH AMPUL: .5; 3 SOLUTION RESPIRATORY (INHALATION) at 06:29

## 2019-03-03 RX ADMIN — IPRATROPIUM BROMIDE AND ALBUTEROL SULFATE SCH AMPUL: .5; 3 SOLUTION RESPIRATORY (INHALATION) at 19:41

## 2019-03-03 RX ADMIN — IPRATROPIUM BROMIDE AND ALBUTEROL SULFATE SCH AMPUL: .5; 3 SOLUTION RESPIRATORY (INHALATION) at 07:31

## 2019-03-03 RX ADMIN — IPRATROPIUM BROMIDE AND ALBUTEROL SULFATE SCH: .5; 3 SOLUTION RESPIRATORY (INHALATION) at 13:03

## 2019-03-03 RX ADMIN — ALBUTEROL SULFATE PRN MG: 2.5 SOLUTION RESPIRATORY (INHALATION) at 03:13

## 2019-03-03 NOTE — PROGRESS NOTE
Assessment and Plan


Assessment and plan: 


Patient is a 67 yo woman with a history of Obesity, HTN, COPD, and CHF who 

presented with acute onset of right sided weakness, treated with Alteplase





-Acute Ischemic Stroke with Right hemiparesis and expressive aphasia s/p tPA: 

stroke protocol but hold oral statin due to speech/swallow impairment, use 

rectal ASA unless passes swallow evaluation, Neurology is following


-Accelerated HTN due to above: permissive for now, Neurology is following 


-Dyslipidemia: liptor


-Chronic Heart failure: strict I/O, daily weight, afterload reduction, 


-COPD (chronic obstructive pulmonary disease): supplemental oxygen as needed, 

nebulizer therapy prn





repeat CT head


passed bedside swallow today. start diet


transferred out of ICU





History


Interval history: 


Patient was seen and examined. Follow-up on current diagnosis of CVA. Overnight 

uneventful. Patient denies any chest pain, shortness breath, nausea/vomiting or 

severe headaches. Imaging, nursing note, chart, labs and old chart reviewed. 

Discussed with patient. 





Hospitalist Physical





- Physical exam


Narrative exam: 


Gen: WDWN, NAD, Awake, Alert, appears Orientated 


HEENT: NCAT, EOMI, PERRL, OP Clear 


Neck: supple, no adenopathy, no thyromegaly, no JVD 


CVS/Heart: RRR, normal S1S2, pulses present bilaterally 


Chest/Lungs: CTA B, Symmetrical chest expansion, good air entry bilaterally 


GI/Abdomen: soft, NTND, good bowel sounds, no guarding or rebound 


/Bladder: no suprapubic tenderness, no CVA or paraspinal tenderness 


Extermity/Skin: no c/c/e, no obvious rash 


MSK: FROM x 4, but weaker on right ext


Neuro: CN 2-12 grossly intact except speech, right sided hemiparesis, upper and 

lower


Psych: calm 








- Constitutional


Vitals: 


                                        











Temp Pulse Resp BP Pulse Ox


 


 97.1 F L  102 H  22   134/89   92 


 


 03/03/19 07:21  03/03/19 07:32  03/03/19 07:40  03/03/19 07:21  03/03/19 07:32











General appearance: Present: obese





Results





- Labs


CBC & Chem 7: 


                                 03/02/19 02:00





                                 03/02/19 02:00


Labs: 


                             Laboratory Last Values











WBC  9.5 K/mm3 (4.5-11.0)   03/02/19  02:00    


 


RBC  5.68 M/mm3 (3.65-5.03)  H  03/02/19  02:00    


 


Hgb  16.7 gm/dl (10.1-14.3)  H  03/02/19  02:00    


 


Hct  49.1 % (30.3-42.9)  H  03/02/19  02:00    


 


MCV  86 fl (79-97)   03/02/19  02:00    


 


MCH  29 pg (28-32)   03/02/19  02:00    


 


MCHC  34 % (30-34)   03/02/19  02:00    


 


RDW  14.9 % (13.2-15.2)   03/02/19  02:00    


 


Plt Count  169 K/mm3 (140-440)   03/02/19  02:00    


 


Lymph % (Auto)  6.4 % (13.4-35.0)  L  03/02/19  02:00    


 


Mono % (Auto)  7.8 % (0.0-7.3)  H  03/02/19  02:00    


 


Eos % (Auto)  0.5 % (0.0-4.3)   03/02/19  02:00    


 


Baso % (Auto)  0.2 % (0.0-1.8)   03/02/19  02:00    


 


Lymph #  0.6 K/mm3 (1.2-5.4)  L  03/02/19  02:00    


 


Mono #  0.7 K/mm3 (0.0-0.8)   03/02/19  02:00    


 


Eos #  0.0 K/mm3 (0.0-0.4)   03/02/19  02:00    


 


Baso #  0.0 K/mm3 (0.0-0.1)   03/02/19  02:00    


 


Seg Neutrophils %  85.1 % (40.0-70.0)  H  03/02/19  02:00    


 


Seg Neutrophils #  8.1 K/mm3 (1.8-7.7)  H  03/02/19  02:00    


 


PT  13.4 Sec. (12.2-14.9)   02/28/19  13:20    


 


INR  0.96  (0.87-1.13)   02/28/19  13:20    


 


APTT  20.1 Sec. (24.2-36.6)  L  02/28/19  13:20    


 


Thrombin Time  19.6 Sec. (15.1-19.6)   02/28/19  13:20    


 


POC ABG pH  7.440  (7.35-7.45)   03/03/19  03:39    


 


POC ABG pCO2  42.0  (35-45)   03/03/19  03:39    


 


POC ABG pO2  73  ()  L  03/03/19  03:39    


 


POC ABG HCO3  28.5   03/03/19  03:39    


 


POC ABG Total CO2  30   03/03/19  03:39    


 


POC ABG O2 Sat  95   03/03/19  03:39    


 


POC ABG Base Excess  4   03/03/19  03:39    


 


FiO2  40 %  03/03/19  03:39    


 


Sodium  145 mmol/L (137-145)   03/02/19  02:00    


 


Potassium  3.7 mmol/L (3.6-5.0)   03/02/19  02:00    


 


Chloride  105.0 mmol/L ()   03/02/19  02:00    


 


Carbon Dioxide  27 mmol/L (22-30)   03/02/19  02:00    


 


Anion Gap  17 mmol/L  03/02/19  02:00    


 


BUN  20 mg/dL (7-17)  H  03/02/19  02:00    


 


Creatinine  0.7 mg/dL (0.7-1.2)   03/02/19  02:00    


 


Estimated GFR  > 60 ml/min  03/02/19  02:00    


 


BUN/Creatinine Ratio  29 %  03/02/19  02:00    


 


Glucose  128 mg/dL ()  H  03/02/19  02:00    


 


Hemoglobin A1c  6.7 % (4-6)  H  03/01/19  18:00    


 


Calcium  8.1 mg/dL (8.4-10.2)  L  03/02/19  02:00    


 


Total Bilirubin  0.60 mg/dL (0.1-1.2)   02/28/19  13:20    


 


AST  16 units/L (5-40)   02/28/19  13:20    


 


ALT  14 units/L (7-56)   02/28/19  13:20    


 


Alkaline Phosphatase  120 units/L ()   02/28/19  13:20    


 


Total Creatine Kinase  69 units/L ()   02/28/19  13:20    


 


CK-MB (CK-2)  1.9 ng/mL (0.0-4.0)   02/28/19  13:20    


 


CK-MB (CK-2) Rel Index  2.7  (0-4)   02/28/19  13:20    


 


Troponin T  < 0.010 ng/mL (0.00-0.029)   02/28/19  13:20    


 


Total Protein  7.0 g/dL (6.3-8.2)   02/28/19  13:20    


 


Albumin  4.4 g/dL (3.9-5)   02/28/19  13:20    


 


Albumin/Globulin Ratio  1.7 %  02/28/19  13:20    


 


Triglycerides  277 mg/dL (2-149)  H  03/01/19  05:30    


 


Cholesterol  193 mg/dL ()   03/01/19  05:30    


 


LDL Cholesterol Direct  137 mg/dL ()  H  03/01/19  05:30    


 


HDL Cholesterol  30 mg/dL (40-59)  L  03/01/19  05:30    


 


Cholesterol/HDL Ratio  6.43 %  03/01/19  05:30    


 


Urine Color  Straw  (Yellow)   02/28/19  15:41    


 


Urine Turbidity  Clear  (Clear)   02/28/19  15:41    


 


Urine pH  7.0  (5.0-7.0)   02/28/19  15:41    


 


Ur Specific Gravity  1.018  (1.003-1.030)   02/28/19  15:41    


 


Urine Protein  <15 mg/dl mg/dL (Negative)   02/28/19  15:41    


 


Urine Glucose (UA)  Neg mg/dL (Negative)   02/28/19  15:41    


 


Urine Ketones  Neg mg/dL (Negative)   02/28/19  15:41    


 


Urine Blood  Neg  (Negative)   02/28/19  15:41    


 


Urine Nitrite  Neg  (Negative)   02/28/19  15:41    


 


Urine Bilirubin  Neg  (Negative)   02/28/19  15:41    


 


Urine Urobilinogen  < 2.0 mg/dL (<2.0)   02/28/19  15:41    


 


Ur Leukocyte Esterase  Neg  (Negative)   02/28/19  15:41    


 


Urine WBC (Auto)  < 1.0 /HPF (0.0-6.0)   02/28/19  15:41    


 


Urine RBC (Auto)  1.0 /HPF (0.0-6.0)   02/28/19  15:41    


 


Urine Mucus  Few /HPF  02/28/19  15:41    


 


Urine Opiates Screen  Presumptive negative   02/28/19  15:41    


 


Urine Methadone Screen  Presumptive negative   02/28/19  15:41    


 


Ur Barbiturates Screen  Presumptive negative   02/28/19  15:41    


 


Ur Phencyclidine Scrn  Presumptive negative   02/28/19  15:41    


 


Ur Amphetamines Screen  Presumptive negative   02/28/19  15:41    


 


U Benzodiazepines Scrn  Presumptive negative   02/28/19  15:41    


 


Urine Cocaine Screen  Presumptive negative   02/28/19  15:41    


 


U Marijuana (THC) Screen  Presumptive negative   02/28/19  15:41    


 


Drugs of Abuse Note  Disclamer   02/28/19  15:41

## 2019-03-04 RX ADMIN — IPRATROPIUM BROMIDE AND ALBUTEROL SULFATE SCH AMPUL: .5; 3 SOLUTION RESPIRATORY (INHALATION) at 07:44

## 2019-03-04 RX ADMIN — IPRATROPIUM BROMIDE AND ALBUTEROL SULFATE SCH AMPUL: .5; 3 SOLUTION RESPIRATORY (INHALATION) at 19:45

## 2019-03-04 RX ADMIN — BISACODYL PRN MG: 10 SUPPOSITORY RECTAL at 20:35

## 2019-03-04 RX ADMIN — IPRATROPIUM BROMIDE AND ALBUTEROL SULFATE SCH: .5; 3 SOLUTION RESPIRATORY (INHALATION) at 03:06

## 2019-03-04 RX ADMIN — IPRATROPIUM BROMIDE AND ALBUTEROL SULFATE SCH AMPUL: .5; 3 SOLUTION RESPIRATORY (INHALATION) at 13:29

## 2019-03-04 NOTE — MAGNETIC RESONANCE REPORT
PROCEDURE:  MR BRAIN WO CON 

 

TECHNIQUE:  Magnetic resonance imaging of the brain was performed without contrast material.  

 

HISTORY: stroke 

 

FINDINGS: MRI of the brain was performed using sagittal T1, axial diffusion, axial T2, axial FLAIR, a
xial T2*gradient echo, coronal FLAIR and axial T1-weighted images. 

 

These images demonstrate an acute infarct of the left periventricular white matter extending into the
 left basal ganglia,  diffusion and ADC images 16-21, 2.6 x 1.7 cm 

 

Additionally, there are 4 small foci of increased  diffusion, low ADC signal in the left cerebel
lum, images 7-8, each approximately 0.3 cm consistent with acute infarcts. 

 

There is normal gray-white differentiation. There are moderate chronic appearing small vessel ischemi
c white matter changes in the subcortical and periventricular white matter. 

 

There are normal flow voids in the vertebral arteries, the basilar artery and both internal carotid a
rteries. 

 

There is partial opacification of left mastoid air cells. The right mastoid air cells appear clear. T
here is an air-fluid level left maxillary sinus consistent with sinusitis. 

 

IMPRESSION: 

Acute left basal ganglia/left periventricular white matter infarct 

Small acute infarcts in left cerebellum 

 

This document is electronically signed by Sam Suárez MD., March 1 2019 05:08:33 PM ET

## 2019-03-04 NOTE — MAGNETIC RESONANCE REPORT
PROCEDURE: MR MRA/MRV HEAD WO CON 

 

HISTORY: stroke 

 

FINDINGS: 

MRA of the intracranial arterial vasculature was performed and data was reformatted into multiple pro
jections. 

 

These images demonstrate, in the posterior circulation, that both vertebral arteries are small. The b
asilar artery is small but patent. Both posterior cerebral arteries appear patent. 

 

In the anterior circulation, there is a moderate stenosis of the distal aspect of the M1 segment of t
he right middle cerebral artery. The left middle cerebral artery appears patent. Both anterior cerebr
al arteries appear patent. 

 

No aneurysm is seen. 

 

IMPRESSION: The intracranial arterial vasculature appears patent. The vertebral arteries and basilar 
artery all appear small. There is a moderate stenosis of the distal M1 portion of the right middle ce
rebral artery 

 

This document is electronically signed by Sam Suárez MD., March 1 2019 05:11:05 PM ET

## 2019-03-04 NOTE — PROGRESS NOTE
Assessment and Plan


Assessment and plan: 


Patient is a 67 yo woman with a history of Obesity, HTN, COPD, and CHF who 

presented with acute onset of right sided weakness, treated with Alteplase





-Acute Ischemic Stroke with Right hemiparesis and expressive aphasia s/p tPA: 

stroke protocol but hold oral statin due to speech/swallow impairment, use 

rectal ASA unless passes swallow evaluation, Neurology is following


-Accelerated HTN due to above: permissive for now, Neurology is following 


-Dyslipidemia: liptor


-Chronic Heart failure: strict I/O, daily weight, afterload reduction, 


-COPD (chronic obstructive pulmonary disease): supplemental oxygen as needed, 

nebulizer therapy prn





spoke with greg Miranda at bedside


passed bedside swallow today. start diet








History


Interval history: 


Patient was seen and examined. Follow-up on current diagnosis of CVA. Overnight 

uneventful. Patient denies any chest pain, shortness breath, nausea/vomiting or 

severe headaches. Imaging, nursing note, chart, labs and old chart reviewed. 

Discussed with patient. 





Hospitalist Physical





- Physical exam


Narrative exam: 


Gen: WDWN, NAD, Awake, Alert, appears Orientated 


HEENT: NCAT, EOMI, PERRL, OP Clear 


Neck: supple, no adenopathy, no thyromegaly, no JVD 


CVS/Heart: RRR, normal S1S2, pulses present bilaterally 


Chest/Lungs: CTA B, Symmetrical chest expansion, good air entry bilaterally 


GI/Abdomen: soft, NTND, good bowel sounds, no guarding or rebound 


/Bladder: no suprapubic tenderness, no CVA or paraspinal tenderness 


Extermity/Skin: no c/c/e, no obvious rash 


MSK: FROM x 4, but weaker on right ext


Neuro: CN 2-12 grossly intact except speech, right sided hemiparesis, upper and 

lower


Psych: calm 








- Constitutional


Vitals: 


                                        











Temp Pulse Resp BP Pulse Ox


 


 97.7 F   103 H  22   122/70   91 


 


 03/04/19 08:04  03/04/19 13:38  03/04/19 13:38  03/04/19 08:04  03/04/19 08:04











General appearance: Present: obese





Results





- Labs


CBC & Chem 7: 


                                 03/02/19 02:00





                                 03/02/19 02:00


Labs: 


                             Laboratory Last Values











WBC  9.5 K/mm3 (4.5-11.0)   03/02/19  02:00    


 


RBC  5.68 M/mm3 (3.65-5.03)  H  03/02/19  02:00    


 


Hgb  16.7 gm/dl (10.1-14.3)  H  03/02/19  02:00    


 


Hct  49.1 % (30.3-42.9)  H  03/02/19  02:00    


 


MCV  86 fl (79-97)   03/02/19  02:00    


 


MCH  29 pg (28-32)   03/02/19  02:00    


 


MCHC  34 % (30-34)   03/02/19  02:00    


 


RDW  14.9 % (13.2-15.2)   03/02/19  02:00    


 


Plt Count  169 K/mm3 (140-440)   03/02/19  02:00    


 


Lymph % (Auto)  6.4 % (13.4-35.0)  L  03/02/19  02:00    


 


Mono % (Auto)  7.8 % (0.0-7.3)  H  03/02/19  02:00    


 


Eos % (Auto)  0.5 % (0.0-4.3)   03/02/19  02:00    


 


Baso % (Auto)  0.2 % (0.0-1.8)   03/02/19  02:00    


 


Lymph #  0.6 K/mm3 (1.2-5.4)  L  03/02/19  02:00    


 


Mono #  0.7 K/mm3 (0.0-0.8)   03/02/19  02:00    


 


Eos #  0.0 K/mm3 (0.0-0.4)   03/02/19  02:00    


 


Baso #  0.0 K/mm3 (0.0-0.1)   03/02/19  02:00    


 


Seg Neutrophils %  85.1 % (40.0-70.0)  H  03/02/19  02:00    


 


Seg Neutrophils #  8.1 K/mm3 (1.8-7.7)  H  03/02/19  02:00    


 


PT  13.4 Sec. (12.2-14.9)   02/28/19  13:20    


 


INR  0.96  (0.87-1.13)   02/28/19  13:20    


 


APTT  20.1 Sec. (24.2-36.6)  L  02/28/19  13:20    


 


Thrombin Time  19.6 Sec. (15.1-19.6)   02/28/19  13:20    


 


POC ABG pH  7.440  (7.35-7.45)   03/03/19  03:39    


 


POC ABG pCO2  42.0  (35-45)   03/03/19  03:39    


 


POC ABG pO2  73  ()  L  03/03/19  03:39    


 


POC ABG HCO3  28.5   03/03/19  03:39    


 


POC ABG Total CO2  30   03/03/19  03:39    


 


POC ABG O2 Sat  95   03/03/19  03:39    


 


POC ABG Base Excess  4   03/03/19  03:39    


 


FiO2  40 %  03/03/19  03:39    


 


Sodium  145 mmol/L (137-145)   03/02/19  02:00    


 


Potassium  3.7 mmol/L (3.6-5.0)   03/02/19  02:00    


 


Chloride  105.0 mmol/L ()   03/02/19  02:00    


 


Carbon Dioxide  27 mmol/L (22-30)   03/02/19  02:00    


 


Anion Gap  17 mmol/L  03/02/19  02:00    


 


BUN  20 mg/dL (7-17)  H  03/02/19  02:00    


 


Creatinine  0.7 mg/dL (0.7-1.2)   03/02/19  02:00    


 


Estimated GFR  > 60 ml/min  03/02/19  02:00    


 


BUN/Creatinine Ratio  29 %  03/02/19  02:00    


 


Glucose  128 mg/dL ()  H  03/02/19  02:00    


 


Hemoglobin A1c  6.7 % (4-6)  H  03/01/19  18:00    


 


Calcium  8.1 mg/dL (8.4-10.2)  L  03/02/19  02:00    


 


Total Bilirubin  0.60 mg/dL (0.1-1.2)   02/28/19  13:20    


 


AST  16 units/L (5-40)   02/28/19  13:20    


 


ALT  14 units/L (7-56)   02/28/19  13:20    


 


Alkaline Phosphatase  120 units/L ()   02/28/19  13:20    


 


Total Creatine Kinase  69 units/L ()   02/28/19  13:20    


 


CK-MB (CK-2)  1.9 ng/mL (0.0-4.0)   02/28/19  13:20    


 


CK-MB (CK-2) Rel Index  2.7  (0-4)   02/28/19  13:20    


 


Troponin T  < 0.010 ng/mL (0.00-0.029)   02/28/19  13:20    


 


Total Protein  7.0 g/dL (6.3-8.2)   02/28/19  13:20    


 


Albumin  4.4 g/dL (3.9-5)   02/28/19  13:20    


 


Albumin/Globulin Ratio  1.7 %  02/28/19  13:20    


 


Triglycerides  277 mg/dL (2-149)  H  03/01/19  05:30    


 


Cholesterol  193 mg/dL ()   03/01/19  05:30    


 


LDL Cholesterol Direct  137 mg/dL ()  H  03/01/19  05:30    


 


HDL Cholesterol  30 mg/dL (40-59)  L  03/01/19  05:30    


 


Cholesterol/HDL Ratio  6.43 %  03/01/19  05:30    


 


Urine Color  Straw  (Yellow)   02/28/19  15:41    


 


Urine Turbidity  Clear  (Clear)   02/28/19  15:41    


 


Urine pH  7.0  (5.0-7.0)   02/28/19  15:41    


 


Ur Specific Gravity  1.018  (1.003-1.030)   02/28/19  15:41    


 


Urine Protein  <15 mg/dl mg/dL (Negative)   02/28/19  15:41    


 


Urine Glucose (UA)  Neg mg/dL (Negative)   02/28/19  15:41    


 


Urine Ketones  Neg mg/dL (Negative)   02/28/19  15:41    


 


Urine Blood  Neg  (Negative)   02/28/19  15:41    


 


Urine Nitrite  Neg  (Negative)   02/28/19  15:41    


 


Urine Bilirubin  Neg  (Negative)   02/28/19  15:41    


 


Urine Urobilinogen  < 2.0 mg/dL (<2.0)   02/28/19  15:41    


 


Ur Leukocyte Esterase  Neg  (Negative)   02/28/19  15:41    


 


Urine WBC (Auto)  < 1.0 /HPF (0.0-6.0)   02/28/19  15:41    


 


Urine RBC (Auto)  1.0 /HPF (0.0-6.0)   02/28/19  15:41    


 


Urine Mucus  Few /HPF  02/28/19  15:41    


 


Urine Opiates Screen  Presumptive negative   02/28/19  15:41    


 


Urine Methadone Screen  Presumptive negative   02/28/19  15:41    


 


Ur Barbiturates Screen  Presumptive negative   02/28/19  15:41    


 


Ur Phencyclidine Scrn  Presumptive negative   02/28/19  15:41    


 


Ur Amphetamines Screen  Presumptive negative   02/28/19  15:41    


 


U Benzodiazepines Scrn  Presumptive negative   02/28/19  15:41    


 


Urine Cocaine Screen  Presumptive negative   02/28/19  15:41    


 


U Marijuana (THC) Screen  Presumptive negative   02/28/19  15:41    


 


Drugs of Abuse Note  Disclamer   02/28/19  15:41

## 2019-03-05 RX ADMIN — MORPHINE SULFATE PRN MG: 2 INJECTION, SOLUTION INTRAMUSCULAR; INTRAVENOUS at 16:12

## 2019-03-05 RX ADMIN — IPRATROPIUM BROMIDE AND ALBUTEROL SULFATE SCH AMPUL: .5; 3 SOLUTION RESPIRATORY (INHALATION) at 02:11

## 2019-03-05 RX ADMIN — IPRATROPIUM BROMIDE AND ALBUTEROL SULFATE SCH AMPUL: .5; 3 SOLUTION RESPIRATORY (INHALATION) at 13:42

## 2019-03-05 RX ADMIN — IPRATROPIUM BROMIDE AND ALBUTEROL SULFATE SCH AMPUL: .5; 3 SOLUTION RESPIRATORY (INHALATION) at 09:28

## 2019-03-05 RX ADMIN — Medication PRN ML: at 21:24

## 2019-03-05 RX ADMIN — IPRATROPIUM BROMIDE AND ALBUTEROL SULFATE SCH AMPUL: .5; 3 SOLUTION RESPIRATORY (INHALATION) at 20:19

## 2019-03-05 RX ADMIN — ASPIRIN SCH MG: 325 TABLET ORAL at 10:59

## 2019-03-05 NOTE — PROGRESS NOTE
Assessment and Plan


Assessment and plan: 


Patient is a 69 yo woman with a history of Obesity, HTN, COPD, and CHF who 

presented with acute onset of right sided weakness, treated with Alteplase





-Acute Ischemic Stroke with Right hemiparesis and expressive aphasia s/p tPA: 

stroke protocol but hold oral statin due to speech/swallow impairment, use 

rectal ASA unless passes swallow evaluation, Neurology is following


-Accelerated HTN due to above: permissive for now, Neurology is following 


-Dyslipidemia: liptor


-Chronic Heart failure: strict I/O, daily weight, afterload reduction, 


-COPD (chronic obstructive pulmonary disease): supplemental oxygen as needed, 

nebulizer therapy prn





spoke with greg Miranda at bedside yesterday


passed bedside swallow today, start diet


placement pending








History


Interval history: 


Patient was seen and examined. Follow-up on current diagnosis of CVA. Overnight 

uneventful. Patient denies any chest pain, shortness breath, nausea/vomiting or 

severe headaches. Imaging, nursing note, chart, labs and old chart reviewed. 

Discussed with patient. 





Hospitalist Physical





- Physical exam


Narrative exam: 


Gen: WDWN, NAD, Awake, Alert, appears Orientated 


HEENT: NCAT, EOMI, PERRL, OP Clear 


Neck: supple, no adenopathy, no thyromegaly, no JVD 


CVS/Heart: RRR, normal S1S2, pulses present bilaterally 


Chest/Lungs: CTA B, Symmetrical chest expansion, good air entry bilaterally 


GI/Abdomen: soft, NTND, good bowel sounds, no guarding or rebound 


/Bladder: no suprapubic tenderness, no CVA or paraspinal tenderness 


Extermity/Skin: no c/c/e, no obvious rash 


MSK: FROM x 4, but weaker on right ext


Neuro: CN 2-12 grossly intact except speech, right sided hemiparesis, upper and 

lower


Psych: calm 








- Constitutional


Vitals: 


                                        











Temp Pulse Resp BP Pulse Ox


 


 97.5 F L  97 H  18   123/65   97 


 


 03/04/19 13:19  03/05/19 09:42  03/05/19 09:42  03/05/19 02:00  03/05/19 09:31











General appearance: Present: obese





Results





- Labs


CBC & Chem 7: 


                                 03/02/19 02:00





                                 03/02/19 02:00


Labs: 


                             Laboratory Last Values











WBC  9.5 K/mm3 (4.5-11.0)   03/02/19  02:00    


 


RBC  5.68 M/mm3 (3.65-5.03)  H  03/02/19  02:00    


 


Hgb  16.7 gm/dl (10.1-14.3)  H  03/02/19  02:00    


 


Hct  49.1 % (30.3-42.9)  H  03/02/19  02:00    


 


MCV  86 fl (79-97)   03/02/19  02:00    


 


MCH  29 pg (28-32)   03/02/19  02:00    


 


MCHC  34 % (30-34)   03/02/19  02:00    


 


RDW  14.9 % (13.2-15.2)   03/02/19  02:00    


 


Plt Count  169 K/mm3 (140-440)   03/02/19  02:00    


 


Lymph % (Auto)  6.4 % (13.4-35.0)  L  03/02/19  02:00    


 


Mono % (Auto)  7.8 % (0.0-7.3)  H  03/02/19  02:00    


 


Eos % (Auto)  0.5 % (0.0-4.3)   03/02/19  02:00    


 


Baso % (Auto)  0.2 % (0.0-1.8)   03/02/19  02:00    


 


Lymph #  0.6 K/mm3 (1.2-5.4)  L  03/02/19  02:00    


 


Mono #  0.7 K/mm3 (0.0-0.8)   03/02/19  02:00    


 


Eos #  0.0 K/mm3 (0.0-0.4)   03/02/19  02:00    


 


Baso #  0.0 K/mm3 (0.0-0.1)   03/02/19  02:00    


 


Seg Neutrophils %  85.1 % (40.0-70.0)  H  03/02/19  02:00    


 


Seg Neutrophils #  8.1 K/mm3 (1.8-7.7)  H  03/02/19  02:00    


 


PT  13.4 Sec. (12.2-14.9)   02/28/19  13:20    


 


INR  0.96  (0.87-1.13)   02/28/19  13:20    


 


APTT  20.1 Sec. (24.2-36.6)  L  02/28/19  13:20    


 


Thrombin Time  19.6 Sec. (15.1-19.6)   02/28/19  13:20    


 


POC ABG pH  7.440  (7.35-7.45)   03/03/19  03:39    


 


POC ABG pCO2  42.0  (35-45)   03/03/19  03:39    


 


POC ABG pO2  73  ()  L  03/03/19  03:39    


 


POC ABG HCO3  28.5   03/03/19  03:39    


 


POC ABG Total CO2  30   03/03/19  03:39    


 


POC ABG O2 Sat  95   03/03/19  03:39    


 


POC ABG Base Excess  4   03/03/19  03:39    


 


FiO2  40 %  03/03/19  03:39    


 


Sodium  145 mmol/L (137-145)   03/02/19  02:00    


 


Potassium  3.7 mmol/L (3.6-5.0)   03/02/19  02:00    


 


Chloride  105.0 mmol/L ()   03/02/19  02:00    


 


Carbon Dioxide  27 mmol/L (22-30)   03/02/19  02:00    


 


Anion Gap  17 mmol/L  03/02/19  02:00    


 


BUN  20 mg/dL (7-17)  H  03/02/19  02:00    


 


Creatinine  0.7 mg/dL (0.7-1.2)   03/02/19  02:00    


 


Estimated GFR  > 60 ml/min  03/02/19  02:00    


 


BUN/Creatinine Ratio  29 %  03/02/19  02:00    


 


Glucose  128 mg/dL ()  H  03/02/19  02:00    


 


Hemoglobin A1c  6.7 % (4-6)  H  03/01/19  18:00    


 


Calcium  8.1 mg/dL (8.4-10.2)  L  03/02/19  02:00    


 


Total Bilirubin  0.60 mg/dL (0.1-1.2)   02/28/19  13:20    


 


AST  16 units/L (5-40)   02/28/19  13:20    


 


ALT  14 units/L (7-56)   02/28/19  13:20    


 


Alkaline Phosphatase  120 units/L ()   02/28/19  13:20    


 


Total Creatine Kinase  69 units/L ()   02/28/19  13:20    


 


CK-MB (CK-2)  1.9 ng/mL (0.0-4.0)   02/28/19  13:20    


 


CK-MB (CK-2) Rel Index  2.7  (0-4)   02/28/19  13:20    


 


Troponin T  < 0.010 ng/mL (0.00-0.029)   02/28/19  13:20    


 


Total Protein  7.0 g/dL (6.3-8.2)   02/28/19  13:20    


 


Albumin  4.4 g/dL (3.9-5)   02/28/19  13:20    


 


Albumin/Globulin Ratio  1.7 %  02/28/19  13:20    


 


Triglycerides  277 mg/dL (2-149)  H  03/01/19  05:30    


 


Cholesterol  193 mg/dL ()   03/01/19  05:30    


 


LDL Cholesterol Direct  137 mg/dL ()  H  03/01/19  05:30    


 


HDL Cholesterol  30 mg/dL (40-59)  L  03/01/19  05:30    


 


Cholesterol/HDL Ratio  6.43 %  03/01/19  05:30    


 


Urine Color  Straw  (Yellow)   02/28/19  15:41    


 


Urine Turbidity  Clear  (Clear)   02/28/19  15:41    


 


Urine pH  7.0  (5.0-7.0)   02/28/19  15:41    


 


Ur Specific Gravity  1.018  (1.003-1.030)   02/28/19  15:41    


 


Urine Protein  <15 mg/dl mg/dL (Negative)   02/28/19  15:41    


 


Urine Glucose (UA)  Neg mg/dL (Negative)   02/28/19  15:41    


 


Urine Ketones  Neg mg/dL (Negative)   02/28/19  15:41    


 


Urine Blood  Neg  (Negative)   02/28/19  15:41    


 


Urine Nitrite  Neg  (Negative)   02/28/19  15:41    


 


Urine Bilirubin  Neg  (Negative)   02/28/19  15:41    


 


Urine Urobilinogen  < 2.0 mg/dL (<2.0)   02/28/19  15:41    


 


Ur Leukocyte Esterase  Neg  (Negative)   02/28/19  15:41    


 


Urine WBC (Auto)  < 1.0 /HPF (0.0-6.0)   02/28/19  15:41    


 


Urine RBC (Auto)  1.0 /HPF (0.0-6.0)   02/28/19  15:41    


 


Urine Mucus  Few /HPF  02/28/19  15:41    


 


Urine Opiates Screen  Presumptive negative   02/28/19  15:41    


 


Urine Methadone Screen  Presumptive negative   02/28/19  15:41    


 


Ur Barbiturates Screen  Presumptive negative   02/28/19  15:41    


 


Ur Phencyclidine Scrn  Presumptive negative   02/28/19  15:41    


 


Ur Amphetamines Screen  Presumptive negative   02/28/19  15:41    


 


U Benzodiazepines Scrn  Presumptive negative   02/28/19  15:41    


 


Urine Cocaine Screen  Presumptive negative   02/28/19  15:41    


 


U Marijuana (THC) Screen  Presumptive negative   02/28/19  15:41    


 


Drugs of Abuse Note  Disclamer   02/28/19  15:41

## 2019-03-05 NOTE — CAT SCAN REPORT
PROCEDURE: CT ABDOMEN PELVIS WO CON 

 

TECHNIQUE:  Computerized axial tomography of the abdomen and pelvis was performed without intravenous
 contrast. This study is performed without intravascular contrast material and its sensitivity for ab
dominal and pelvic pathology, including neoplasms, inflammation, abscess, free fluid, thrombosis, art
erial dissection and infarction, is reduced compared with a contrast enhanced study.  

HISTORY: severe abd pains 

 

COMPARISONS:  None . 

 

FINDINGS: 

 

Lower Lung fields:  There are linear bands of atelectasis in the lung bases. A small benign-appearing
 air cyst is visualized in the right lung base. 

 

Upper Abdomen:  Multiple moderate-sized calcified gallstones are visualized partially filling the gal
lbladder. Gallbladder is otherwise unremarkable. The unenhanced images of the liver are unremarkable.
 The adrenal glands, the pancreas and spleen are unremarkable. There is a mild ill-defined increased 
density around the tail the pancreas. Some of this could be related to breathing motion artifact. Cor
relation with serum amylase and lipase recommended to exclude mild acute pancreatitis. The pancreas i
s otherwise unremarkable. The findings are seen on images 32-42 series 2 axial images. 

 

Kidneys, Ureters and Urinary bladder:  No abnormalities are seen. Urinary bladder is moderately diste
nded and otherwise unremarkable. 

 

Retroperitoneum: Atherosclerotic changes seen in the abdominal aorta. There is mild aneurysmal dilata
tion infrarenal abdominal aorta measuring 3.8 x 3.9 cm. There is also mild ectasia of the left common
 iliac artery measuring up to 1.5 cm in diameter. 

 

Nonspecific subcentimeter lymph nodes are seen in the retroperitoneum.  No pathologically enlarged ly
mph nodes are identified. 

 

Bowel:  No abnormalities are identified. There is no evidence of bowel obstruction ascites or free in
traperitoneal gas. Normal-appearing appendix is seen in the right lower quadrant. 

 

Reproductive organs:  The uterus is deviated to the right of midline and otherwise unremarkable. No a
bnormal adnexal masses are seen. 

 

Other: Mild lumbar scoliosis visualized convex to the left. Moderate diffuse degenerative disc change
s seen in the lumbar spine as well as facet arthritis. No acute bone abnormalities are identified. 

 

IMPRESSION: 

 

Cholelithiasis. 

 

Slight ill-definition soft tissues adjacent to the tail of the pancreas as described. Some of this ma
y be related to breathing motion artifact. I cannot exclude mild acute inflammatory change, pancreati
tis. Correlation with serum amylase and lipase levels is recommended. The pancreas is otherwise unrem
arkable. 

 

Mild aneurysmal dilatation infrarenal abdominal aorta. 

 

Lumbar scoliosis and degenerative changes as described. No acute bone abnormalities are visualized 

 

This document is electronically signed by Camden Meyers MD., March 5 2019 06:46:07 PM ET

## 2019-03-05 NOTE — XRAY REPORT
AP ABDOMEN:



HISTORY: Abdominal pain.



The abdominal gas pattern is unremarkable.  No masses or

organomegaly is identified and there is no gross evidence of free air 

or fluid.  No significant soft tissue calcifications are noted.



IMPRESSION:

Unremarkable abdomen.

## 2019-03-06 RX ADMIN — IPRATROPIUM BROMIDE AND ALBUTEROL SULFATE SCH AMPUL: .5; 3 SOLUTION RESPIRATORY (INHALATION) at 07:56

## 2019-03-06 RX ADMIN — IPRATROPIUM BROMIDE AND ALBUTEROL SULFATE SCH: .5; 3 SOLUTION RESPIRATORY (INHALATION) at 02:25

## 2019-03-06 RX ADMIN — Medication PRN ML: at 23:18

## 2019-03-06 RX ADMIN — IPRATROPIUM BROMIDE AND ALBUTEROL SULFATE SCH AMPUL: .5; 3 SOLUTION RESPIRATORY (INHALATION) at 14:36

## 2019-03-06 RX ADMIN — MORPHINE SULFATE PRN MG: 2 INJECTION, SOLUTION INTRAMUSCULAR; INTRAVENOUS at 15:10

## 2019-03-06 RX ADMIN — ASPIRIN SCH MG: 325 TABLET ORAL at 10:09

## 2019-03-06 RX ADMIN — IPRATROPIUM BROMIDE AND ALBUTEROL SULFATE SCH AMPUL: .5; 3 SOLUTION RESPIRATORY (INHALATION) at 19:46

## 2019-03-06 RX ADMIN — MORPHINE SULFATE PRN MG: 2 INJECTION, SOLUTION INTRAMUSCULAR; INTRAVENOUS at 03:50

## 2019-03-06 NOTE — ULTRASOUND REPORT
Abdominal sonogram:



History: Abdominal pain.



Findings:



Aortic diameter 1.8 cm.

Gallbladder wall thickness 2.6 mm. Multiple large calculi identified 

within the gallbladder. No pericholecystic fluid. Common bile duct 

diameter 3.7 mm. No intrahepatic or extrahepatic duct dilatation.



Fatty liver. Normal spleen. Pancreas is obscured by bowel gas.

Right kidney 10.2 x 4.7 x 4.9 cm. Cortical thickness 1.1 cm.

Left kidney 10.3 x 5 x 5.5 cm. Cortical thickness is 1.4 cm.



Impression:



Fatty liver. Multiple large calculi within the gallbladder .

## 2019-03-06 NOTE — PROGRESS NOTE
Assessment and Plan


Assessment and plan: 


Patient is a 69 yo woman with a history of Obesity, HTN, COPD, and CHF who 

presented with acute onset of right sided weakness, treated with Alteplase





-Acute Ischemic Stroke with Right hemiparesis and expressive aphasia s/p tPA: 

stroke protocol but hold oral statin due to speech/swallow impairment, use 

rectal ASA unless passes swallow evaluation, Neurology is following


-Accelerated HTN due to above: permissive for now, Neurology is following 


-Dyslipidemia: liptor


-Chronic Heart failure: strict I/O, daily weight, afterload reduction, 


-COPD (chronic obstructive pulmonary disease): supplemental oxygen as needed, 

nebulizer therapy prn


-Abdominal pains with cholelithiasis: consult Surgery and GI, ordered Lipase





placement pending








History


Interval history: 


Patient was seen and examined. Follow-up on current diagnosis of CVA. Overnight 

uneventful. Patient denies any chest pain, shortness breath, nausea/vomiting or 

severe headaches. Imaging, nursing note, chart, labs and old chart reviewed. 

Discussed with patient. She complains of abd pains 





Hospitalist Physical





- Physical exam


Narrative exam: 


Gen: WDWN, NAD, Awake, Alert, appears Orientated 


HEENT: NCAT, EOMI, PERRL, OP Clear 


Neck: supple, no adenopathy, no thyromegaly, no JVD 


CVS/Heart: RRR, normal S1S2, pulses present bilaterally 


Chest/Lungs: CTA B, Symmetrical chest expansion, good air entry bilaterally 


GI/Abdomen: soft, NTND, good bowel sounds, no guarding or rebound 


/Bladder: no suprapubic tenderness, no CVA or paraspinal tenderness 


Extermity/Skin: no c/c/e, no obvious rash 


MSK: FROM x 4, but weaker on right ext


Neuro: CN 2-12 grossly intact except speech, right sided hemiparesis, upper and 

lower


Psych: calm 








- Constitutional


Vitals: 


                                        











Temp Pulse Resp BP Pulse Ox


 


 97.4 F L  105 H  24   118/72   97 


 


 03/06/19 13:23  03/06/19 14:45  03/06/19 15:10  03/06/19 13:23  03/06/19 13:23











General appearance: Present: obese





Results





- Labs


CBC & Chem 7: 


                                 03/02/19 02:00





                                 03/02/19 02:00


Labs: 


                             Laboratory Last Values











WBC  9.5 K/mm3 (4.5-11.0)   03/02/19  02:00    


 


RBC  5.68 M/mm3 (3.65-5.03)  H  03/02/19  02:00    


 


Hgb  16.7 gm/dl (10.1-14.3)  H  03/02/19  02:00    


 


Hct  49.1 % (30.3-42.9)  H  03/02/19  02:00    


 


MCV  86 fl (79-97)   03/02/19  02:00    


 


MCH  29 pg (28-32)   03/02/19  02:00    


 


MCHC  34 % (30-34)   03/02/19  02:00    


 


RDW  14.9 % (13.2-15.2)   03/02/19  02:00    


 


Plt Count  169 K/mm3 (140-440)   03/02/19  02:00    


 


Lymph % (Auto)  6.4 % (13.4-35.0)  L  03/02/19  02:00    


 


Mono % (Auto)  7.8 % (0.0-7.3)  H  03/02/19  02:00    


 


Eos % (Auto)  0.5 % (0.0-4.3)   03/02/19  02:00    


 


Baso % (Auto)  0.2 % (0.0-1.8)   03/02/19  02:00    


 


Lymph #  0.6 K/mm3 (1.2-5.4)  L  03/02/19  02:00    


 


Mono #  0.7 K/mm3 (0.0-0.8)   03/02/19  02:00    


 


Eos #  0.0 K/mm3 (0.0-0.4)   03/02/19  02:00    


 


Baso #  0.0 K/mm3 (0.0-0.1)   03/02/19  02:00    


 


Seg Neutrophils %  85.1 % (40.0-70.0)  H  03/02/19  02:00    


 


Seg Neutrophils #  8.1 K/mm3 (1.8-7.7)  H  03/02/19  02:00    


 


PT  13.4 Sec. (12.2-14.9)   02/28/19  13:20    


 


INR  0.96  (0.87-1.13)   02/28/19  13:20    


 


APTT  20.1 Sec. (24.2-36.6)  L  02/28/19  13:20    


 


Thrombin Time  19.6 Sec. (15.1-19.6)   02/28/19  13:20    


 


POC ABG pH  7.440  (7.35-7.45)   03/03/19  03:39    


 


POC ABG pCO2  42.0  (35-45)   03/03/19  03:39    


 


POC ABG pO2  73  ()  L  03/03/19  03:39    


 


POC ABG HCO3  28.5   03/03/19  03:39    


 


POC ABG Total CO2  30   03/03/19  03:39    


 


POC ABG O2 Sat  95   03/03/19  03:39    


 


POC ABG Base Excess  4   03/03/19  03:39    


 


FiO2  40 %  03/03/19  03:39    


 


Sodium  145 mmol/L (137-145)   03/02/19  02:00    


 


Potassium  3.7 mmol/L (3.6-5.0)   03/02/19  02:00    


 


Chloride  105.0 mmol/L ()   03/02/19  02:00    


 


Carbon Dioxide  27 mmol/L (22-30)   03/02/19  02:00    


 


Anion Gap  17 mmol/L  03/02/19  02:00    


 


BUN  20 mg/dL (7-17)  H  03/02/19  02:00    


 


Creatinine  0.7 mg/dL (0.7-1.2)   03/02/19  02:00    


 


Estimated GFR  > 60 ml/min  03/02/19  02:00    


 


BUN/Creatinine Ratio  29 %  03/02/19  02:00    


 


Glucose  128 mg/dL ()  H  03/02/19  02:00    


 


Hemoglobin A1c  6.7 % (4-6)  H  03/01/19  18:00    


 


Calcium  8.1 mg/dL (8.4-10.2)  L  03/02/19  02:00    


 


Total Bilirubin  0.60 mg/dL (0.1-1.2)   02/28/19  13:20    


 


AST  16 units/L (5-40)   02/28/19  13:20    


 


ALT  14 units/L (7-56)   02/28/19  13:20    


 


Alkaline Phosphatase  120 units/L ()   02/28/19  13:20    


 


Total Creatine Kinase  69 units/L ()   02/28/19  13:20    


 


CK-MB (CK-2)  1.9 ng/mL (0.0-4.0)   02/28/19  13:20    


 


CK-MB (CK-2) Rel Index  2.7  (0-4)   02/28/19  13:20    


 


Troponin T  < 0.010 ng/mL (0.00-0.029)   02/28/19  13:20    


 


Total Protein  7.0 g/dL (6.3-8.2)   02/28/19  13:20    


 


Albumin  4.4 g/dL (3.9-5)   02/28/19  13:20    


 


Albumin/Globulin Ratio  1.7 %  02/28/19  13:20    


 


Triglycerides  277 mg/dL (2-149)  H  03/01/19  05:30    


 


Cholesterol  193 mg/dL ()   03/01/19  05:30    


 


LDL Cholesterol Direct  137 mg/dL ()  H  03/01/19  05:30    


 


HDL Cholesterol  30 mg/dL (40-59)  L  03/01/19  05:30    


 


Cholesterol/HDL Ratio  6.43 %  03/01/19  05:30    


 


Urine Color  Straw  (Yellow)   02/28/19  15:41    


 


Urine Turbidity  Clear  (Clear)   02/28/19  15:41    


 


Urine pH  7.0  (5.0-7.0)   02/28/19  15:41    


 


Ur Specific Gravity  1.018  (1.003-1.030)   02/28/19  15:41    


 


Urine Protein  <15 mg/dl mg/dL (Negative)   02/28/19  15:41    


 


Urine Glucose (UA)  Neg mg/dL (Negative)   02/28/19  15:41    


 


Urine Ketones  Neg mg/dL (Negative)   02/28/19  15:41    


 


Urine Blood  Neg  (Negative)   02/28/19  15:41    


 


Urine Nitrite  Neg  (Negative)   02/28/19  15:41    


 


Urine Bilirubin  Neg  (Negative)   02/28/19  15:41    


 


Urine Urobilinogen  < 2.0 mg/dL (<2.0)   02/28/19  15:41    


 


Ur Leukocyte Esterase  Neg  (Negative)   02/28/19  15:41    


 


Urine WBC (Auto)  < 1.0 /HPF (0.0-6.0)   02/28/19  15:41    


 


Urine RBC (Auto)  1.0 /HPF (0.0-6.0)   02/28/19  15:41    


 


Urine Mucus  Few /HPF  02/28/19  15:41    


 


Urine Opiates Screen  Presumptive negative   02/28/19  15:41    


 


Urine Methadone Screen  Presumptive negative   02/28/19  15:41    


 


Ur Barbiturates Screen  Presumptive negative   02/28/19  15:41    


 


Ur Phencyclidine Scrn  Presumptive negative   02/28/19  15:41    


 


Ur Amphetamines Screen  Presumptive negative   02/28/19  15:41    


 


U Benzodiazepines Scrn  Presumptive negative   02/28/19  15:41    


 


Urine Cocaine Screen  Presumptive negative   02/28/19  15:41    


 


U Marijuana (THC) Screen  Presumptive negative   02/28/19  15:41    


 


Drugs of Abuse Note  Disclamer   02/28/19  15:41

## 2019-03-07 ENCOUNTER — HOSPITAL ENCOUNTER (INPATIENT)
Dept: HOSPITAL 5 - 3A | Age: 69
LOS: 14 days | Discharge: HOME | DRG: 56 | End: 2019-03-21
Attending: PHYSICAL MEDICINE & REHABILITATION | Admitting: PHYSICAL MEDICINE & REHABILITATION
Payer: MEDICARE

## 2019-03-07 VITALS — SYSTOLIC BLOOD PRESSURE: 148 MMHG | DIASTOLIC BLOOD PRESSURE: 82 MMHG

## 2019-03-07 DIAGNOSIS — Z79.899: ICD-10-CM

## 2019-03-07 DIAGNOSIS — R26.81: ICD-10-CM

## 2019-03-07 DIAGNOSIS — J96.00: ICD-10-CM

## 2019-03-07 DIAGNOSIS — I11.0: ICD-10-CM

## 2019-03-07 DIAGNOSIS — I50.9: ICD-10-CM

## 2019-03-07 DIAGNOSIS — J44.1: ICD-10-CM

## 2019-03-07 DIAGNOSIS — R13.10: ICD-10-CM

## 2019-03-07 DIAGNOSIS — I69.351: Primary | ICD-10-CM

## 2019-03-07 DIAGNOSIS — E66.9: ICD-10-CM

## 2019-03-07 DIAGNOSIS — D69.6: ICD-10-CM

## 2019-03-07 DIAGNOSIS — Z80.9: ICD-10-CM

## 2019-03-07 DIAGNOSIS — Z82.49: ICD-10-CM

## 2019-03-07 DIAGNOSIS — R47.1: ICD-10-CM

## 2019-03-07 DIAGNOSIS — E78.5: ICD-10-CM

## 2019-03-07 DIAGNOSIS — I63.9: ICD-10-CM

## 2019-03-07 PROCEDURE — 83735 ASSAY OF MAGNESIUM: CPT

## 2019-03-07 PROCEDURE — 84134 ASSAY OF PREALBUMIN: CPT

## 2019-03-07 PROCEDURE — 80048 BASIC METABOLIC PNL TOTAL CA: CPT

## 2019-03-07 PROCEDURE — 83880 ASSAY OF NATRIURETIC PEPTIDE: CPT

## 2019-03-07 PROCEDURE — 94760 N-INVAS EAR/PLS OXIMETRY 1: CPT

## 2019-03-07 PROCEDURE — 36415 COLL VENOUS BLD VENIPUNCTURE: CPT

## 2019-03-07 PROCEDURE — 85027 COMPLETE CBC AUTOMATED: CPT

## 2019-03-07 PROCEDURE — 80053 COMPREHEN METABOLIC PANEL: CPT

## 2019-03-07 PROCEDURE — 94640 AIRWAY INHALATION TREATMENT: CPT

## 2019-03-07 PROCEDURE — 85025 COMPLETE CBC W/AUTO DIFF WBC: CPT

## 2019-03-07 PROCEDURE — 71046 X-RAY EXAM CHEST 2 VIEWS: CPT

## 2019-03-07 RX ADMIN — MORPHINE SULFATE PRN MG: 2 INJECTION, SOLUTION INTRAMUSCULAR; INTRAVENOUS at 11:30

## 2019-03-07 RX ADMIN — IPRATROPIUM BROMIDE AND ALBUTEROL SULFATE SCH AMPUL: .5; 3 SOLUTION RESPIRATORY (INHALATION) at 08:28

## 2019-03-07 RX ADMIN — ALBUTEROL SULFATE PRN MG: 2.5 SOLUTION RESPIRATORY (INHALATION) at 22:45

## 2019-03-07 RX ADMIN — IPRATROPIUM BROMIDE AND ALBUTEROL SULFATE SCH AMPUL: .5; 3 SOLUTION RESPIRATORY (INHALATION) at 02:13

## 2019-03-07 RX ADMIN — ASPIRIN SCH MG: 325 TABLET ORAL at 10:09

## 2019-03-07 RX ADMIN — IPRATROPIUM BROMIDE AND ALBUTEROL SULFATE SCH AMPUL: .5; 3 SOLUTION RESPIRATORY (INHALATION) at 15:35

## 2019-03-07 NOTE — PROGRESS NOTE
Assessment and Plan


Assessment and plan: 


Patient is a 69 yo woman with a history of Obesity, HTN, COPD, and CHF who 

presented with acute onset of right sided weakness, treated with Alteplase





-Acute Ischemic Stroke with Right hemiparesis and expressive aphasia s/p tPA: 

stroke protocol but hold oral statin due to speech/swallow impairment, use 

rectal ASA unless passes swallow evaluation, Neurology is following


-Accelerated HTN due to above: permissive for now, Neurology is following 


-Dyslipidemia: liptor


-Chronic Heart failure: strict I/O, daily weight, afterload reduction, 


-COPD (chronic obstructive pulmonary disease): supplemental oxygen as needed, 

nebulizer therapy prn


-Abdominal pains with cholelithiasis: consult Surgery and GI, ordered Lipase











Hospitalist Physical





- Constitutional


Vitals: 


                                        











Temp Pulse Resp BP Pulse Ox


 


 97.6 F   114 H  22   138/85   97 


 


 03/07/19 08:14  03/07/19 08:14  03/07/19 08:14  03/07/19 08:14  03/07/19 08:14











General appearance: Present: obese





Results





- Labs


CBC & Chem 7: 


                                 03/02/19 02:00





                                 03/02/19 02:00


Labs: 


                             Laboratory Last Values











WBC  9.5 K/mm3 (4.5-11.0)   03/02/19  02:00    


 


RBC  5.68 M/mm3 (3.65-5.03)  H  03/02/19  02:00    


 


Hgb  16.7 gm/dl (10.1-14.3)  H  03/02/19  02:00    


 


Hct  49.1 % (30.3-42.9)  H  03/02/19  02:00    


 


MCV  86 fl (79-97)   03/02/19  02:00    


 


MCH  29 pg (28-32)   03/02/19  02:00    


 


MCHC  34 % (30-34)   03/02/19  02:00    


 


RDW  14.9 % (13.2-15.2)   03/02/19  02:00    


 


Plt Count  169 K/mm3 (140-440)   03/02/19  02:00    


 


Lymph % (Auto)  6.4 % (13.4-35.0)  L  03/02/19  02:00    


 


Mono % (Auto)  7.8 % (0.0-7.3)  H  03/02/19  02:00    


 


Eos % (Auto)  0.5 % (0.0-4.3)   03/02/19  02:00    


 


Baso % (Auto)  0.2 % (0.0-1.8)   03/02/19  02:00    


 


Lymph #  0.6 K/mm3 (1.2-5.4)  L  03/02/19  02:00    


 


Mono #  0.7 K/mm3 (0.0-0.8)   03/02/19  02:00    


 


Eos #  0.0 K/mm3 (0.0-0.4)   03/02/19  02:00    


 


Baso #  0.0 K/mm3 (0.0-0.1)   03/02/19  02:00    


 


Seg Neutrophils %  85.1 % (40.0-70.0)  H  03/02/19  02:00    


 


Seg Neutrophils #  8.1 K/mm3 (1.8-7.7)  H  03/02/19  02:00    


 


PT  13.4 Sec. (12.2-14.9)   02/28/19  13:20    


 


INR  0.96  (0.87-1.13)   02/28/19  13:20    


 


APTT  20.1 Sec. (24.2-36.6)  L  02/28/19  13:20    


 


Thrombin Time  19.6 Sec. (15.1-19.6)   02/28/19  13:20    


 


POC ABG pH  7.440  (7.35-7.45)   03/03/19  03:39    


 


POC ABG pCO2  42.0  (35-45)   03/03/19  03:39    


 


POC ABG pO2  73  ()  L  03/03/19  03:39    


 


POC ABG HCO3  28.5   03/03/19  03:39    


 


POC ABG Total CO2  30   03/03/19  03:39    


 


POC ABG O2 Sat  95   03/03/19  03:39    


 


POC ABG Base Excess  4   03/03/19  03:39    


 


FiO2  40 %  03/03/19  03:39    


 


Sodium  145 mmol/L (137-145)   03/02/19  02:00    


 


Potassium  3.7 mmol/L (3.6-5.0)   03/02/19  02:00    


 


Chloride  105.0 mmol/L ()   03/02/19  02:00    


 


Carbon Dioxide  27 mmol/L (22-30)   03/02/19  02:00    


 


Anion Gap  17 mmol/L  03/02/19  02:00    


 


BUN  20 mg/dL (7-17)  H  03/02/19  02:00    


 


Creatinine  0.7 mg/dL (0.7-1.2)   03/02/19  02:00    


 


Estimated GFR  > 60 ml/min  03/02/19  02:00    


 


BUN/Creatinine Ratio  29 %  03/02/19  02:00    


 


Glucose  128 mg/dL ()  H  03/02/19  02:00    


 


Hemoglobin A1c  6.7 % (4-6)  H  03/01/19  18:00    


 


Calcium  8.1 mg/dL (8.4-10.2)  L  03/02/19  02:00    


 


Total Bilirubin  0.60 mg/dL (0.1-1.2)   02/28/19  13:20    


 


AST  16 units/L (5-40)   02/28/19  13:20    


 


ALT  14 units/L (7-56)   02/28/19  13:20    


 


Alkaline Phosphatase  120 units/L ()   02/28/19  13:20    


 


Total Creatine Kinase  69 units/L ()   02/28/19  13:20    


 


CK-MB (CK-2)  1.9 ng/mL (0.0-4.0)   02/28/19  13:20    


 


CK-MB (CK-2) Rel Index  2.7  (0-4)   02/28/19  13:20    


 


Troponin T  < 0.010 ng/mL (0.00-0.029)   02/28/19  13:20    


 


Total Protein  7.0 g/dL (6.3-8.2)   02/28/19  13:20    


 


Albumin  4.4 g/dL (3.9-5)   02/28/19  13:20    


 


Albumin/Globulin Ratio  1.7 %  02/28/19  13:20    


 


Triglycerides  277 mg/dL (2-149)  H  03/01/19  05:30    


 


Cholesterol  193 mg/dL ()   03/01/19  05:30    


 


LDL Cholesterol Direct  137 mg/dL ()  H  03/01/19  05:30    


 


HDL Cholesterol  30 mg/dL (40-59)  L  03/01/19  05:30    


 


Cholesterol/HDL Ratio  6.43 %  03/01/19  05:30    


 


Lipase  60 units/L (13-60)   03/06/19  17:08    


 


Urine Color  Straw  (Yellow)   02/28/19  15:41    


 


Urine Turbidity  Clear  (Clear)   02/28/19  15:41    


 


Urine pH  7.0  (5.0-7.0)   02/28/19  15:41    


 


Ur Specific Gravity  1.018  (1.003-1.030)   02/28/19  15:41    


 


Urine Protein  <15 mg/dl mg/dL (Negative)   02/28/19  15:41    


 


Urine Glucose (UA)  Neg mg/dL (Negative)   02/28/19  15:41    


 


Urine Ketones  Neg mg/dL (Negative)   02/28/19  15:41    


 


Urine Blood  Neg  (Negative)   02/28/19  15:41    


 


Urine Nitrite  Neg  (Negative)   02/28/19  15:41    


 


Urine Bilirubin  Neg  (Negative)   02/28/19  15:41    


 


Urine Urobilinogen  < 2.0 mg/dL (<2.0)   02/28/19  15:41    


 


Ur Leukocyte Esterase  Neg  (Negative)   02/28/19  15:41    


 


Urine WBC (Auto)  < 1.0 /HPF (0.0-6.0)   02/28/19  15:41    


 


Urine RBC (Auto)  1.0 /HPF (0.0-6.0)   02/28/19  15:41    


 


Urine Mucus  Few /HPF  02/28/19  15:41    


 


Urine Opiates Screen  Presumptive negative   02/28/19  15:41    


 


Urine Methadone Screen  Presumptive negative   02/28/19  15:41    


 


Ur Barbiturates Screen  Presumptive negative   02/28/19  15:41    


 


Ur Phencyclidine Scrn  Presumptive negative   02/28/19  15:41    


 


Ur Amphetamines Screen  Presumptive negative   02/28/19  15:41    


 


U Benzodiazepines Scrn  Presumptive negative   02/28/19  15:41    


 


Urine Cocaine Screen  Presumptive negative   02/28/19  15:41    


 


U Marijuana (THC) Screen  Presumptive negative   02/28/19  15:41    


 


Drugs of Abuse Note  Disclamer   02/28/19  15:41

## 2019-03-07 NOTE — DISCHARGE SUMMARY
Providers





- Providers


Date of Admission: 


02/28/19 16:23





Attending physician: 


SALONI FREY MD





                                        





02/28/19 16:23


Occupational Therapy Evaluate and Treat [CONS] Routine 


   Comment: 


   Reason For Exam: Neuro deficits


Physical Therapy Evaluation and Treat [CONS] Routine 


   Comment: 


   Reason For Exam: Neuro deficits





02/28/19 16:28


Consult to Physician [CONS] Routine 


   Comment: DR LIBERTY KEARNEY AWARE OF PT


   Consulting Provider: YADI RYAN


   Physician Instructions: 


   Reason For Exam: cva





03/01/19 12:38


Speech Therapy Evaluation and Treat [CONS] Stat 


   Reason For Exam: stroke pt. failed bedside swallow eval





03/06/19 15:27


Consult to Physician [CONS] Routine 


   Comment: called office/tamiko


   Consulting Provider: SAL ELIAS


   Physician Instructions: 


   Reason For Exam: symptomatic Cholelithiasis











Primary care physician: 


[facility], [place]








Hospitalization


Reason for admission: cva


Condition: Serious


Hospital course: 


Patient is a 67 yo woman with a history of Obesity, HTN, COPD, and CHF who 

presented with acute onset of right sided weakness, treated with Alteplase





-Acute Ischemic Stroke with Right hemiparesis and expressive aphasia s/p tPA: 

stroke protocol but hold oral statin due to speech/swallow impairment, converted

 to oral with passing of swallow eval


-Accelerated HTN due to above: permissive for now, Neurology is following 


-Dyslipidemia: liptor


-Chronic Heart failure: strict I/O, daily weight, afterload reduction, 


-COPD (chronic obstructive pulmonary disease): supplemental oxygen as needed, 

nebulizer therapy prn


-Abdominal pains with cholelithiasis: patient was seen by surgery and no surgery

 recommended at this time, they recommended LOW FAT DIET PER SURGERY





patient was discharged to REHAB


FAMILY WAS AT BEDSIDE AND PLAN DISCUSSED WITH THEM


Disposition: [id number]PT [facility]AB [facility]ACILITY


Time spent for discharge: 35 MINS





Core Measure Documentation





- Palliative Care


Palliative Care/ Comfort Measures: Not Applicable





- Core Measures


Any of the following diagnoses?: stroke





- Stroke Discharge Requirements


Statin for LDL = or >70 mg/dl on DC: Yes


Anticoag for atrial fib/atrial flutter: Not Applicable


Antithrombotic for ischemic stroke: Yes





Exam





- Physical Exam


Narrative exam: 


Narrative exam: 


Gen: WDWN, NAD, Awake, Alert, appears Orientated 


HEENT: NCAT, EOMI, PERRL, OP Clear 


Neck: supple, no adenopathy, no thyromegaly, no JVD 


CVS/Heart: RRR, normal S1S2, pulses present bilaterally 


Chest/Lungs: CTA B, Symmetrical chest expansion, good air entry bilaterally 


GI/Abdomen: soft, NTND, good bowel sounds, no guarding or rebound 


/Bladder: no suprapubic tenderness, no CVA or paraspinal tenderness 


Extermity/Skin: no c/c/e, no obvious rash 


MSK: FROM x 4, but weaker on right ext


Neuro: CN 2-12 grossly intact except speech, right sided hemiparesis, upper and 

lower


Psych: calm 











- Constitutional


Vitals: 


                                        











Temp Pulse Resp BP Pulse Ox


 


 98.4 F   111 H  20   148/82   96 


 


 03/07/19 14:56  03/07/19 14:56  03/07/19 14:56  03/07/19 14:56  03/07/19 14:56














Plan


Activity: advance as tolerated, fall precautions


Diet: low fat


Special Instructions: record daily weights, record daily BP diary


Follow up with: 


[facility] [Primary Care Provider[facility] - 3-5 Days


SAL ELIAS DO [Staff Physician] - 7 Days


Prescriptions: 


AtorvaSTATin [Lipitor] 40 mg PO QHS #30 tablet


Aspirin [Aspirin TAB] 325 mg PO QDAY #30 tablet

## 2019-03-07 NOTE — HISTORY AND PHYSICAL REPORT
History of Present Illness


Date: 03/07/19


Date of admission: 


03/07/19 18:09





Chief Complaint: 


Left CVA





History of present illness: 


69 YO Female with Obesity, HTN, COPD, CHF presented to ED for evaluation for 

right sided weakness and decreased responsiveness. Patient unable to provide 

history due to condition, per the son the patient experienced an acute onset of 

right arm and leg weakness, and became unresponsive while at a routine 

appointment for her  at his pulmonologist's office. EMS was notified and 

upon arrival the patient was found to have neurologic deficit resulting in a 

code stroke, found to have Acute CVA and was treated with TPA in ED.  She was 

transferred to the ICU afterwards for monitoring.  MRI brain showed acute left 

basal ganglia/left periventricular white matter infarct and a small acute 

infarct in the left cerebellum. Prior to transfer she developed abdominal pain 

and was evaluated and found to have gallstones.  After she was medically cleared

she was transferred for further rehab.








Past History


Past Medical History: COPD, heart failure, hypertension


Past Surgical History: No surgical history


Social history: , lives with family, full code, other (former smoker, 

denies alcohol and illicit drug abuse)


Family history: cancer, hypertension





Medications and Allergies


                                    Allergies











Allergy/AdvReac Type Severity Reaction Status Date / Time


 


No Known Allergies Allergy   Verified 02/23/17 13:05











                                Home Medications











 Medication  Instructions  Recorded  Confirmed  Last Taken  Type


 


Furosemide [Lasix TAB] 40 mg PO DAILY 02/23/17 02/28/19 02/23/17 History


 


Potassium Chloride [K-Dur] 10 meq PO DAILY 02/23/17 02/28/19 02/23/17 History


 


Albuterol Sulfate [Ventolin HFA] 1 puff IH PRN PRN 02/28/19 02/28/19 Unknown 

History


 


Atenolol [Tenormin] 100 mg PO DAILY 02/28/19 02/28/19 Unknown History


 


Lisinopril [Zestril] 40 mg PO DAILY 02/28/19 02/28/19 Unknown History


 


Loratadine [Claritin] 10 mg PO DAILY 02/28/19 02/28/19 Unknown History


 


Omeprazole 40 mg PO DAILY 02/28/19 02/28/19 Unknown History


 


ALBUTEROL NEB's [Proventil 0.083% 2.5 mg IH Q4HRT PRN  nebu 03/07/19  Unknown Rx





NEBS]     


 


Acetaminophen [Acetaminophen TAB] 650 mg PO Q6H PRN  tablet 03/07/19  Unknown Rx


 


Aspirin [Aspirin TAB] 325 mg PO QDAY #30 tablet 03/07/19  Unknown Rx


 


AtorvaSTATin [Lipitor] 40 mg PO QHS #30 tablet 03/07/19  Unknown Rx


 


Bisacodyl [Dulcolax suppos] 10 mg OR QDAY PRN  supp.rect 03/07/19  Unknown Rx


 


HYDROcodone/APAP 5-325 [Norco 1 each PO Q4H PRN  tablet 03/07/19  Unknown Rx





5-325 mg TAB]     


 


Ipratropium/Albuterol Sulfate 1 ampul IH Q6HRT  ampul.neb 03/07/19  Unknown Rx





[DUONEB *Not for PRN Use*]     











Active Meds: 


Active Medications





Acetaminophen (Tylenol)  650 mg PO Q4H PRN


   PRN Reason: Pain MILD(1-3)/Fever >100.5/HA


Acetaminophen/Hydrocodone Bitart (Norco 5/325)  1 each PO Q4H PRN


   PRN Reason: Pain, Moderate (4-6)


Albuterol (Proventil)  2.5 mg IH Q4HRT PRN


   PRN Reason: Shortness Of Breath


Aspirin (Aspirin)  325 mg PO QDAY FRANCIS


Atenolol (Tenormin)  25 mg PO QDAY FRANCIS


Atorvastatin Calcium (Lipitor)  40 mg PO QHS FRANCIS


Bisacodyl (Dulcolax)  10 mg OR QDAY PRN


   PRN Reason: Constipation unrelieved by MOM


Enoxaparin Sodium (Lovenox)  40 mg SUB-Q QDAY FRANCIS


Magnesium Hydroxide (Milk Of Magnesia)  30 ml PO Q4H PRN


   PRN Reason: Constipation











Review of Systems


All systems: negative (unless noted below 12 systems are reviewed and negative)


Constitutional: weakness


Ears, nose, mouth and throat: no decreased hearing, no headache


Cardiovascular: dyspnea on exertion, no chest pain, no rapid/irregular heart 

beat, no shortness of breath, no leg edema


Respiratory: shortness of breath, respiratory infections, home oxygen, no cough


Gastrointestinal: abdominal pain, constipation, no nausea, no vomiting


Musculoskeletal: muscle weakness


Neurological: weakness, lack of coordination, balance difficulties, gait 

dysfunction, other (right visual deficit)


Psychiatric: no anxiety, no hopelessness





Exam





- Exam


Narrative exam: 


MUSCULOSKELETAL SPECIALTY EXAM





CONSTITUTIONAL:


Well developed, well nourished, appropriately groomed obese. 


RIGHT hand dominant.





LYMPHATIC: 


No appreciable abnormalities palpable in neck





RESPIRATORY:


Bilateral wheeze to ascultation, no increased work of breathing, on 2L O2 at 

home 24/7 





CARDIOVASCULAR:  


Regular Rate/ Rhythm, no swelling, edema or tenderness in BUE or BLE. Pulses 

palpable in all extremities. All extremities warm.  





GI: 


+ bowel sounds, soft, mild TTP, nondistended.





INTEGUMENTARY:


Normal, no lesion, rash, masses or bruising noted in extremities. 





MUSCULOSKELETAL:


BUE and BLE normal without defect, crepitus, subluxation, effusion, arthritic 

changes or TTP. 





         SA       EF      WE      EE       FF      FA      HF      KE      ADF  

   EHL      APF


R      4-/5     4-/5    4-/5     4-/5     3/5    4-/5   4-/5     4-/5    4-/5   

   4-/5      4-/5


L        5/5      5/5     5/5     5/5      5/5     5 /5    5/5     5/5      5/5 

     5/5         5/5





ROM decreased on right, normal on left


Tone normal





NEURO:


CN II : Visual fields full to confrontation on left, decreased on right


CN II, III : PERRL


CN III, IV, VI : EOMI


CN V : Facial sensation intact


CN VII : asymmetric facial expressions (right facial droop)


CN VIII : Hearing intact to finger rustle


CN IX,  X : Palate/uvula elevate midline, phonation abnormal 


CN XI : Intact shoulder shrug and head rotation on left, decreased on right


CN XII : Tongue appears to protrude midline but does not protrude much out of 

mouth





Sensation intact in all extremities without extinction. 


Reflexes 2+ on left and 3+ on right at biceps, brachioradialis and patella.  No 

clonus at ankles. Coordination intact in on LUE and decreased on RUE 


No tremor noted in 4 extremities.  


Naming and repetition impaired. Able to choose items and colors. Had difficulty 

touching right face, likely has left inattention


Follows 1 step commands.


Dysphasia and Dysarthria present


Dysphagia 








POSTURE and GAIT:


Sitting posture good. Balance appears reasonable in bed, will assess when up.  

Gait deferred until seen with therapy.





PSYCH:


Alert, orientated x3, affect appears flat. Insight appears intact.











- Allied health notes


Allied health notes reviewed: nursing, PT, ST, OT





- Imaging and cardiology


Chest x-ray: report reviewed


Abdominal x-ray: report reviewed, image reviewed


CT scan - abdomen: report reviewed


CT Scan - head: report reviewed, image reviewed


MRI - head: report reviewed, image reviewed





Assessment and Plan


Assessment and plan: 


Patient was assessed and evaluated for Acute Inpatient Rehab Unit.


Due to the patients above-mentioned medical complexity, along with decreased 

functional mobility and self care, this patient continues to require and be 

appropriate for a comprehensive, multidisciplinary acute-in-patient 

rehabilitation program.  These needs cannot be met in an outpatient or other 

less intensive setting.  The patient would continue to benefit from skilled 

therapy intervention for at least 3 hours per day, five days a week, with 

techniques specific to the needs of the patient to improve function, activities 

of daily living, and reintegration into the community. The patient continues to 

require:


-- OT to improve ROM, self-care, and learn use of adaptive equipment


-- PT to improve strength and balance, functional transfers, and ambulation with

energy conservation techniques to improve functional mobility


-- SLP to address cognitive deficits and swallowing ability


-- 24 hour RN to ensure and prevent skin breakdown, promote progressive independ

ence while ensuring safety, ensure education regarding medications, and 

incorporation of the rehabilitation at the bedside


-- 24 hour Physiatrist to coordinate this interdisciplinary program, and to m

anage/prevent complications as a result of the patients medical comorbidities. 


-Plan of care by day 4


-Weekly team conferences


With such a program, there is a reasonable certainty that the goals ind

ividualized for this patient can be achieved within the specified length of 

stay.








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 

prognosis and need to control HTN/DM/HLD. Will monitor for shoulder hand 

syndrome and post stroke depression. Former Tob use


I69.321 Dysphasia: SLP to work to improve ability to communicate with strategies

and repetition


I69.391 Dysphagia: on modified diet, advance as able. EStim, FEES, MBS as needed


I69.322 Dysarthria: SLP to work on improving motor control for speech 

improvement


I69.312 Visuospatial deficit after CVA: monitor right side, cue towards right, 

scanning strategies


I10 Hypertension: Will restart atenolol, monitor BP and adjust as needed


I50.9 Heart Failure: BNP pending for baseline, restart lasix if needed


J44.9 COPD: On home O2, continue inhalers/nebs and adjust as needed, monitor for

worsening.  CXR in AM, was being treated with Abx prior to CVA and did not 

finish, wheezing now 





Z73.6 ADL dysfunction: OT will work on improving ability to perform ADLs 

(including assistive devices) to increase independence and decrease caregiver 

burden and improve functional transfers and mobility training.


R26.2 Difficulty walking: PT will work on gait training and proper use of 

assistive devices and advance as appropriate to use of stairs and outside 

ambulation on uneven surfaces.


R26.81 Unsteadiness on feet: PT will work on improving static and dynamic 

sitting and standing balance as well as proper use of assistive devices to 

decrease risk of falls.


R26.89 Abnormality of gait: PT will work to improve safety and efficiency of 

gait through neuromotor training and gait training along with instruction on 

proper use of assistive devices.


M62.81 Muscle weakness: PT & OT will work on strengthening exercises to improve 

functional strength including mixture of closed and open kinetic chain 

exercises.


R53.81 Debility: PT & OT will work on improving overall functional status to 

improve participation with ADLs, mobility and social involvement.


R53.83 Fatigue: PT & OT will work on improving endurance through aerobic 

exercises and therapeutic activity while monitoring patients tolerance for 

activity and vital signs as needed.


 





DVT ppx:Lovenox


Pain: Continue physical modalities in therapy and pain medications as needed to 

achieve functional pain control.  


Sleep: Monitor and address as needed. 


Bowel: Monitor and address as needed. Medications added for constipation 


Appetite: Monitor and address as needed.


Discharge planning: Pending therapy progress and care plan meeting. Will 

continue discussion with therapy team, SW, patient and family.





Restrictions/ Precautions:


Falls, Aspiration, right visual deficit





WB status: FWB





Functional Hx:


ADLs: Independent


Cognition: Independent


Mobility: No AD








Barriers to Discharge: Decreased mobility and ability to perform self care, 

balance deficits, weakness


Estimated Length of Stay: 14-21 days


Discharge Destination: Home with family








POST ADMISSION PHYSICIAN EVALUATION


I have examined the patient and find that functional status, medical condition 

and appropriateness for IR[facility] admission are essentially unchanged from those 

described in the preadmission screening. Will monitor for worsening HTN, CHF, or

COPD, DVT/PE,  bowel and bladder complications and complications due to these 

comorbidities and electrolyte abnormalities.  Will attempt to avoid occurrence 

of these issues or treat them if they present themselves.

## 2019-03-07 NOTE — CONSULTATION
History of Present Illness


Consult date: 03/07/19


Reason for consult: abdominal pain


Chief complaint: 





abdominal pain





- History of present illness


History of present illness: 





69 yo F with hx of HTN, COPD on home O2 presented to ER as a stroke alert. She w

as found to have an acute stroke and treated with alteplase. She has right sided

deficits as a result of the stroke. The patient has new complaint of abdominal 

pain. She states the pain is sharp and located in the upper abdomen. She points 

mainly to the epigastrum but states it sometimes radiates to the right and left 

side. She has never had pain like this before. She also c/o nausea but no vomiti

ng. She is tolerating a pureed diet. She has not had a BM since admission but is

passing flatus. No f/c. Abd pain w/u was done which consisted of Ct A/P and RUQ 

u/s which showed gallstones. Surgery asked to evaluate patient.





Past History


Past Medical History: COPD, heart failure, hypertension


Past Surgical History: No surgical history, Other (reviewed)


Social history: , lives with family.  denies: smoking, alcohol abuse, 

prescription drug abuse


Family history: hypertension





Medications and Allergies


                                    Allergies











Allergy/AdvReac Type Severity Reaction Status Date / Time


 


No Known Allergies Allergy   Verified 02/23/17 13:05











                                Home Medications











 Medication  Instructions  Recorded  Confirmed  Last Taken  Type


 


Furosemide [Lasix TAB] 40 mg PO DAILY 02/23/17 02/28/19 02/23/17 History


 


Potassium Chloride [K-Dur] 10 meq PO DAILY 02/23/17 02/28/19 02/23/17 History


 


Albuterol Sulfate [Ventolin HFA] 1 puff IH PRN PRN 02/28/19 02/28/19 Unknown 

History


 


Atenolol [Tenormin] 100 mg PO DAILY 02/28/19 02/28/19 Unknown History


 


Lisinopril [Zestril] 40 mg PO DAILY 02/28/19 02/28/19 Unknown History


 


Loratadine [Claritin] 10 mg PO DAILY 02/28/19 02/28/19 Unknown History


 


Omeprazole 40 mg PO DAILY 02/28/19 02/28/19 Unknown History


 


levoFLOXacin [Levaquin] 750 mg PO DAILY 02/28/19 02/28/19 Unknown History


 


predniSONE [Deltasone] 60 mg PO DAILY 02/28/19 02/28/19 Unknown History











Active Meds: 


Active Medications





Acetaminophen (Tylenol)  650 mg PO Q6H PRN


   PRN Reason: Non Cardiac Pain or Temp>100.5


Acetaminophen/Hydrocodone Bitart (Norco 5/325)  1 each PO Q4H PRN


   PRN Reason: Pain, Moderate (4-6)


Albuterol (Proventil)  2.5 mg IH Q4HRT PRN


   PRN Reason: Shortness Of Breath


   Last Admin: 03/03/19 03:13 Dose:  2.5 mg


   Documented by: 


Albuterol/Ipratropium (Duoneb *Not For Prn Use*)  1 ampul IH Q6HRT [facility]


   Last Admin: 03/07/19 08:28 Dose:  1 ampul


   Documented by: 


Aspirin (Aspirin)  325 mg PO QDAY [facility]


   Last Admin: 03/07/19 10:09 Dose:  325 mg


   Documented by: 


Atorvastatin Calcium (Lipitor)  40 mg PO QHS [facility]


   Last Admin: 03/06/19 23:18 Dose:  40 mg


   Documented by: 


Bisacodyl (Dulcolax)  10 mg MO QDAY PRN


   PRN Reason: Constipation


   Last Admin: 03/04/19 20:35 Dose:  10 mg


   Documented by: 


Morphine Sulfate (Morphine)  2 mg IV Q4H PRN


   PRN Reason: Pain , Severe (7-10)


   Last Admin: 03/07/19 11:30 Dose:  2 mg


   Documented by: 


Ondansetron HCl (Zofran)  4 mg IV Q8H PRN


   PRN Reason: Nausea And Vomiting


Sodium Chloride (Sodium Chloride Flush Syringe 10 Ml)  10 ml IV PRN PRN


   PRN Reason: LINE FLUSH


   Last Admin: 03/06/19 23:18 Dose:  10 ml


   Documented by: 











Review of Systems


All systems: negative (10 pt ROS performed and negative except for that listed 

in HPI)





Exam


                                   Vital Signs











Temp Pulse Resp BP Pulse Ox


 


 98.3 F   74   26 H  167/93   98 


 


 02/28/19 13:10  02/28/19 13:10  02/28/19 13:10  02/28/19 13:10  02/28/19 13:10











Narrative exam: 





Gen: Awake and alert. Able to answer yes and no questions. Speech is aphasic


ENT: no scleral icterus or conjunctival pallor


CV: s1, s2+


resp; even and unlabored


Abd: soft, ND, mild TTP in RUQ, epigastrum, LUQ, LLQ, and mid abdomen. No r/r/g


Ext: no c/c/e. R sided weakness





Results





- Labs





                                 03/02/19 02:00





                                 03/02/19 02:00





- Imaging


CT scan - abdomen: report reviewed, image reviewed


CT scan - pelvis: report reviewed, image reviewed


US - abdomen: report reviewed, image reviewed





Assessment and Plan





69 yo F with 





1. abdominal pain


2. cholelithiasis


3. acute CVA


4. COPD on home O2





Plan:





1. Although the patient has gallstones on imaging, she has no signs suggestive 

of acute cholecystitis. Her WBC and LFTs are normal. Lipase is normal. Her pain 

is more generalized. I recommend diet modification to low fat diet. This may 

help patient with symptoms if they are related to gallstones. No urgent 

intervention is recommended at this time especially in the light of acute CVA.


2. add PPI


3. prn pain control





If patient's symptoms worsen, would consider HIDA scan. 





Thank you, please call with questions.

## 2019-03-08 LAB
ALBUMIN SERPL-MCNC: 3.6 G/DL (ref 3.9–5)
ALT SERPL-CCNC: 26 UNITS/L (ref 7–56)
BASOPHILS # (AUTO): 0 K/MM3 (ref 0–0.1)
BASOPHILS NFR BLD AUTO: 0.2 % (ref 0–1.8)
BUN SERPL-MCNC: 49 MG/DL (ref 7–17)
BUN/CREAT SERPL: 49 %
CALCIUM SERPL-MCNC: 8.8 MG/DL (ref 8.4–10.2)
EOSINOPHIL # BLD AUTO: 0.2 K/MM3 (ref 0–0.4)
EOSINOPHIL NFR BLD AUTO: 1.9 % (ref 0–4.3)
HCT VFR BLD CALC: 43.1 % (ref 30.3–42.9)
HEMOLYSIS INDEX: 7
HGB BLD-MCNC: 14.6 GM/DL (ref 10.1–14.3)
LYMPHOCYTES # BLD AUTO: 0.6 K/MM3 (ref 1.2–5.4)
LYMPHOCYTES NFR BLD AUTO: 6.3 % (ref 13.4–35)
MCHC RBC AUTO-ENTMCNC: 34 % (ref 30–34)
MCV RBC AUTO: 87 FL (ref 79–97)
MONOCYTES # (AUTO): 0.8 K/MM3 (ref 0–0.8)
MONOCYTES % (AUTO): 8.7 % (ref 0–7.3)
PLATELET # BLD: 109 K/MM3 (ref 140–440)
RBC # BLD AUTO: 4.94 M/MM3 (ref 3.65–5.03)

## 2019-03-08 RX ADMIN — IPRATROPIUM BROMIDE AND ALBUTEROL SULFATE SCH AMPUL: .5; 3 SOLUTION RESPIRATORY (INHALATION) at 08:20

## 2019-03-08 RX ADMIN — IPRATROPIUM BROMIDE AND ALBUTEROL SULFATE SCH AMPUL: .5; 3 SOLUTION RESPIRATORY (INHALATION) at 21:11

## 2019-03-08 RX ADMIN — IPRATROPIUM BROMIDE AND ALBUTEROL SULFATE SCH AMPUL: .5; 3 SOLUTION RESPIRATORY (INHALATION) at 14:43

## 2019-03-08 RX ADMIN — POLYETHYLENE GLYCOL 3350 SCH: 17 POWDER, FOR SOLUTION ORAL at 11:00

## 2019-03-08 RX ADMIN — ASPIRIN SCH MG: 325 TABLET ORAL at 13:28

## 2019-03-08 RX ADMIN — ENOXAPARIN SODIUM SCH MG: 100 INJECTION SUBCUTANEOUS at 13:28

## 2019-03-08 RX ADMIN — IPRATROPIUM BROMIDE AND ALBUTEROL SULFATE SCH AMPUL: .5; 3 SOLUTION RESPIRATORY (INHALATION) at 02:58

## 2019-03-08 RX ADMIN — ATENOLOL SCH MG: 25 TABLET ORAL at 17:51

## 2019-03-08 RX ADMIN — HYDROCODONE BITARTRATE AND ACETAMINOPHEN PRN EACH: 5; 325 TABLET ORAL at 11:19

## 2019-03-08 NOTE — XRAY REPORT
PROCEDURE: XR CHEST ROUTINE 2V 

 

TECHNIQUE:  Chest 2 views 

 

HISTORY: Wheeze, cough, dyspnea 

 

COMPARISONS: 

 

FINDINGS: 

 

There is some streaky opacity seen at the lung bases on lateral view most likely right lower lobe. Ca
rdiac and mediastinal contours are unremarkable. Pulmonary vasculature is unremarkable. No pleural ef
fusion identified. 

 

IMPRESSION:  

 

Mild atelectasis or infiltrate within the right lower lobe. 

 

This document is electronically signed by Woody Atwood MD., March 8 2019 05:26:23 PM ET

## 2019-03-09 LAB
BASOPHILS # (AUTO): 0 K/MM3 (ref 0–0.1)
BASOPHILS NFR BLD AUTO: 0.2 % (ref 0–1.8)
BUN SERPL-MCNC: 36 MG/DL (ref 7–17)
BUN/CREAT SERPL: 36 %
CALCIUM SERPL-MCNC: 8.7 MG/DL (ref 8.4–10.2)
EOSINOPHIL # BLD AUTO: 0.1 K/MM3 (ref 0–0.4)
EOSINOPHIL NFR BLD AUTO: 1.3 % (ref 0–4.3)
HCT VFR BLD CALC: 42.4 % (ref 30.3–42.9)
HEMOLYSIS INDEX: 6
HGB BLD-MCNC: 14.3 GM/DL (ref 10.1–14.3)
LYMPHOCYTES # BLD AUTO: 0.4 K/MM3 (ref 1.2–5.4)
LYMPHOCYTES NFR BLD AUTO: 4.3 % (ref 13.4–35)
MCHC RBC AUTO-ENTMCNC: 34 % (ref 30–34)
MCV RBC AUTO: 86 FL (ref 79–97)
MONOCYTES # (AUTO): 0.8 K/MM3 (ref 0–0.8)
MONOCYTES % (AUTO): 7.9 % (ref 0–7.3)
PLATELET # BLD: 117 K/MM3 (ref 140–440)
RBC # BLD AUTO: 4.91 M/MM3 (ref 3.65–5.03)

## 2019-03-09 RX ADMIN — ASPIRIN SCH MG: 325 TABLET ORAL at 09:30

## 2019-03-09 RX ADMIN — IPRATROPIUM BROMIDE AND ALBUTEROL SULFATE SCH AMPUL: .5; 3 SOLUTION RESPIRATORY (INHALATION) at 14:18

## 2019-03-09 RX ADMIN — POLYETHYLENE GLYCOL 3350 SCH GM: 17 POWDER, FOR SOLUTION ORAL at 09:29

## 2019-03-09 RX ADMIN — FUROSEMIDE SCH MG: 20 TABLET ORAL at 04:38

## 2019-03-09 RX ADMIN — IPRATROPIUM BROMIDE AND ALBUTEROL SULFATE SCH AMPUL: .5; 3 SOLUTION RESPIRATORY (INHALATION) at 07:46

## 2019-03-09 RX ADMIN — FUROSEMIDE SCH MG: 20 TABLET ORAL at 09:35

## 2019-03-09 RX ADMIN — IPRATROPIUM BROMIDE AND ALBUTEROL SULFATE SCH AMPUL: .5; 3 SOLUTION RESPIRATORY (INHALATION) at 04:06

## 2019-03-09 RX ADMIN — IPRATROPIUM BROMIDE AND ALBUTEROL SULFATE SCH AMPUL: .5; 3 SOLUTION RESPIRATORY (INHALATION) at 20:57

## 2019-03-09 RX ADMIN — ENOXAPARIN SODIUM SCH MG: 100 INJECTION SUBCUTANEOUS at 09:29

## 2019-03-09 RX ADMIN — ATENOLOL SCH MG: 25 TABLET ORAL at 09:29

## 2019-03-09 RX ADMIN — HYDROCODONE BITARTRATE AND ACETAMINOPHEN PRN EACH: 5; 325 TABLET ORAL at 13:57

## 2019-03-09 NOTE — PROGRESS NOTE
Subjective


Date of service: 03/09/19


Principal diagnosis: Left CVA


Interval history: 


67 YO Female with Obesity, HTN, COPD, CHF presented to ED for evaluation for 

right sided weakness and decreased responsiveness. Patient unable to provide 

history due to condition, per the son the patient experienced an acute onset of 

right arm and leg weakness, and became unresponsive while at a routine appoin

tment for her  at his pulmonologist's office. EMS was notified and upon 

arrival the patient was found to have neurologic deficit resulting in a code 

stroke, found to have Acute CVA and was treated with TPA in ED.  She was 

transferred to the ICU afterwards for monitoring.  MRI brain showed acute left 

basal ganglia/left periventricular white matter infarct and a small acute 

infarct in the left cerebellum. Prior to transfer she developed abdominal pain 

and was evaluated and found to have gallstones.  After she was medically cleared

she was transferred for further rehab.





Patient is participating in therapy and making reasonable progress. Taking rest 

breaks as needed.   +BM.  Denies pain, palpitations, dyspnea, cough, N/V,  or 

joint pain.  Admits to intermittent colicky RUQ pain but not currently.  Does 

not like food choices.  Encouraged her to eat. BP has been a little better since

starting back on antihypertensive, still tachycardic.  Lasix restarted at low 

dose, monitor renal function.  Giving 1L NS due to dehydration. K replaced. Mg 

elevated, recheck in few days.  Walking with therapy, on stairs. RUE shoulder 

abduction and finger abduction improved.  Speech about the same.








All records, vitals, labs and medications were reviewed.  No other issues per 

patient, nursing or therapy.





Objective





- Exam


Narrative Exam: 


MUSCULOSKELETAL SPECIALTY EXAM





CONSTITUTIONAL:


Well developed, well nourished, appropriately groomed obese. 


RIGHT hand dominant.





RESPIRATORY:


Bilateral wheeze to ascultation, no increased work of breathing, on 2L O2 at 

home 24/7 





CARDIOVASCULAR:  


Regular Rhythm,tachycardic, no swelling, edema or tenderness in BUE or BLE. All 

extremities warm.  





GI: 


+ bowel sounds, soft, NTTP, nondistended.





INTEGUMENTARY:


Normal, no lesion, rash, masses or bruising noted in extremities. 





MUSCULOSKELETAL:


BUE and BLE normal without defect, crepitus, subluxation, effusion, arthritic 

changes or TTP. 





         SA       EF      WE      EE       FF      FA      HF      KE      ADF  

   EHL      APF


R      4-/5     4-/5    4-/5     4-/5     3/5    4-/5   4-/5     4-/5    4-/5   

   4-/5      4-/5


L        5/5      5/5     5/5     5/5      5/5     5 /5    5/5     5/5      5/5 

     5/5         5/5





ROM decreased on right, normal on left


Tone normal





NEURO:


CN II - XII grossly intact except: visual field decreased on right, right facial

droop, decreased shoulder shrug and head rotation on right





Sensation intact in all extremities without extinction. 


No tremor noted in 4 extremities.  


Naming and repetition impaired. Likely has left inattention


Follows 1 step commands.


Dysphasia and Dysarthria present


Dysphagia 








POSTURE and GAIT:


Sitting posture good. Balance appears reasonable in bed, decreased with 

standing.  Gait deferred until seen with therapy.





PSYCH:


Alert, oriented x3, affect appears flat. Insight appears intact.











- Constitutional


Vitals: 


                               Vital Signs - 12hr











  03/09/19 03/09/19 03/09/19





  07:40 07:47 07:51


 


Temperature   36.9 C


 


Pulse Rate   106 H


 


Pulse Rate [ 100 H 102 H 





Bilateral   





Throughout]   


 


Respiratory   22





Rate   


 


Respiratory 20 20 





Rate [Bilateral   





Throughout]   


 


Blood Pressure   117/72


 


O2 Sat by Pulse 95  94





Oximetry   














  03/09/19 03/09/19 03/09/19





  09:29 13:57 14:15


 


Temperature   


 


Pulse Rate 106 H  


 


Pulse Rate [   88





Bilateral   





Throughout]   


 


Respiratory  22 





Rate   


 


Respiratory   18





Rate [Bilateral   





Throughout]   


 


Blood Pressure 117/72  


 


O2 Sat by Pulse   





Oximetry   














  03/09/19 03/09/19 03/09/19





  14:21 14:57 17:24


 


Temperature   37.1 C


 


Pulse Rate   96 H


 


Pulse Rate [ 90  





Bilateral   





Throughout]   


 


Respiratory  22 20





Rate   


 


Respiratory 18  





Rate [Bilateral   





Throughout]   


 


Blood Pressure   123/73


 


O2 Sat by Pulse   95





Oximetry   














- Allied health notes


Allied health notes reviewed: nursing, PT, ST, OT


FIMS assessment as documented by PT/OT/ST: 


Grooming





Patient cleans teeth/dentures:   No


Patient chapman/brushes hair:      Yes


Patient washes, rinses and       Yes


dries face:                      


Patient washes, rinses and       Yes


dries hands:                     


Patient shaves:                  No


Patient performs (no make-up/    3/4 (75%)


shaving):                        


Grooming FIM Score               4. Minimal Assistance (Patient = 75% or more.


                                 Needs touching.)





Toileting





Toileting Device                 Commode over Toilet


Patient able to:                 Adjust clothes before,Clean self,Adjust clothes


                                 after


Patient able to perform:         2/3 (67%)


Toileting FIM Score              4. Minimal Assistance (Patient = 75% or more.


                                 Needs touching.)





Social interaction/Memory/Problem solving





Social Interaction FIM Score     4. Minimal Assistance (Interacts appropriately


                                 75-90%.)


Memory FIM Score                 4. Minimal Assistance (Recognizes and remembers


                                 75-90%.)


Problem Solving FIM Score        4. Minimal Assistance (Solves routine problems


                                 75-90%.)





Transfers





Mode of Locomotion:              Walking


Bed/Chair/Wheelchair Transfers   4. Minimal Assistance (Patient = 75% or more.


FIM Score                        Needs touching.)


Toilet Transfers FIM Score       4. Minimal Assistance (Patient = 75% or more.


                                 Needs touching.)


Patient transferred to:          Shower


Shower Transfers FIM Score       4. Minimal Assistance (Patient = 75% or more.


                                 Needs touching.)





Locomotion- Stairs





Device used on Stairs            Handrail/s


Number of Stairs Ascended/       8


Descended                        


Patient used handrail/support:   Yes


Stairs FIM Score                 2. Maximal Assistance (Patient = 25% or more, 

4-


                                 6 stairs.)





Locomotion- walk/wheelchair





Most Frequent Mode of            Walking


Locomotion:                      


Ambulation Distance              170


Walking FIM Score                3. Moderate Assistance (Patient=50% or more.


                                 Lifting. Minimum 150 ft.)


Wheelchair Propulsion Distance   150


Wheelchair FIM Score             4. Minimal Assistance (Patient = 75% or more.


                                 Minimum of 150 ft.)





Eating





Eating FIM Score                 5. Supervision/Set-Up (Needs help w/ 

containers,


                                 cutting meat, etc.)





Dressing-Upper body





Patient retrieves clothing       No


items:                           


Patient applies/removes UE       n/a


prosthesis or orthosis:          


Upper Body Dressing FIM Score    4. Minimal Assistance (Patient = 75% or more.


                                 Needs touching.)





Dressing-lower body





Patient retrieves clothing       No


items:                           


Patient applies/removes LE       n/a


prosthesis or orthosis:          


Lower Body Dressing FIM Score    4. Minimal Assistance (Patient = 75% or more.


                                 Needs touching.)











- Labs


CBC & Chem 7: 


                                 03/09/19 06:55





                                 03/09/19 06:55


Labs: 


                         Laboratory Results - last 72 hr











  03/08/19 03/08/19 03/09/19





  04:35 04:35 06:55


 


WBC  8.8   9.7


 


RBC  4.94   4.91


 


Hgb  14.6 H   14.3


 


Hct  43.1 H   42.4


 


MCV  87   86


 


MCH  30   29


 


MCHC  34   34


 


RDW  15.0   15.1


 


Plt Count  109 L   117 L


 


Lymph % (Auto)  6.3 L   4.3 L


 


Mono % (Auto)  8.7 H   7.9 H


 


Eos % (Auto)  1.9   1.3


 


Baso % (Auto)  0.2   0.2


 


Lymph #  0.6 L   0.4 L


 


Mono #  0.8   0.8


 


Eos #  0.2   0.1


 


Baso #  0.0   0.0


 


Seg Neutrophils %  82.9 H   86.3 H


 


Seg Neutrophils #  7.3   8.4 H


 


Sodium   143 


 


Potassium   3.4 L 


 


Chloride   99.0 


 


Carbon Dioxide   31 H 


 


Anion Gap   16 


 


BUN   49 H 


 


Creatinine   1.0 


 


Estimated GFR   55 


 


BUN/Creatinine Ratio   49 


 


Glucose   127 H 


 


Calcium   8.8 


 


Magnesium   


 


Total Bilirubin   1.10 


 


AST   19 


 


ALT   26 


 


Alkaline Phosphatase   96 


 


NT-Pro-B Natriuret Pep   70.34 


 


Total Protein   6.0 L 


 


Albumin   3.6 L 


 


Albumin/Globulin Ratio   1.5 














  03/09/19





  06:55


 


WBC 


 


RBC 


 


Hgb 


 


Hct 


 


MCV 


 


MCH 


 


MCHC 


 


RDW 


 


Plt Count 


 


Lymph % (Auto) 


 


Mono % (Auto) 


 


Eos % (Auto) 


 


Baso % (Auto) 


 


Lymph # 


 


Mono # 


 


Eos # 


 


Baso # 


 


Seg Neutrophils % 


 


Seg Neutrophils # 


 


Sodium  138


 


Potassium  3.8


 


Chloride  94.8 L


 


Carbon Dioxide  30


 


Anion Gap  17


 


BUN  36 H


 


Creatinine  1.0


 


Estimated GFR  55


 


BUN/Creatinine Ratio  36


 


Glucose  128 H


 


Calcium  8.7


 


Magnesium  2.80 H


 


Total Bilirubin 


 


AST 


 


ALT 


 


Alkaline Phosphatase 


 


NT-Pro-B Natriuret Pep 


 


Total Protein 


 


Albumin 


 


Albumin/Globulin Ratio 














Assessment and Plan


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 

prognosis and need to control HTN/DM/HLD. Will monitor for shoulder hand 

syndrome and post stroke depression. Former Tob use


I69.321 Dysphasia: SLP to work to improve ability to communicate with strategies

and repetition


I69.391 Dysphagia: on modified diet, advance as able. EStim, FEES, MBS as needed


I69.322 Dysarthria: SLP to work on improving motor control for speech 

improvement


I69.312 Visuospatial deficit after CVA: monitor right side, cue towards right, 

scanning strategies


I10 Hypertension: Cont atenolol, monitor BP and adjust as needed


I50.9 Heart Failure: BNP normal, lasix restarted at low dose


J44.9 COPD: On home O2, continue inhalers/nebs and adjust as needed, monitor for

worsening.  CXR in AM, was being treated with Abx prior to CVA and did not 

finish, wheezing now 


E86.0 Dehydration: 1L NS, encouraged PO intake, monitor


Z73.6 ADL dysfunction: OT will work on improving ability to perform ADLs 

(including assistive devices) to increase independence and decrease caregiver 

burden and improve functional transfers and mobility training.


R26.2 Difficulty walking: PT will work on gait training and proper use of 

assistive devices and advance as appropriate to use of stairs and outside 

ambulation on uneven surfaces.


R26.81 Unsteadiness on feet: PT will work on improving static and dynamic 

sitting and standing balance as well as proper use of assistive devices to 

decrease risk of falls.


R26.89 Abnormality of gait: PT will work to improve safety and efficiency of 

gait through neuromotor training and gait training along with instruction on 

proper use of assistive devices.


M62.81 Muscle weakness: PT & OT will work on strengthening exercises to improve 

functional strength including mixture of closed and open kinetic chain 

exercises.


R53.81 Debility: PT & OT will work on improving overall functional status to 

improve participation with ADLs, mobility and social involvement.


R53.83 Fatigue: PT & OT will work on improving endurance through aerobic 

exercises and therapeutic activity while monitoring patients tolerance for 

activity and vital signs as needed.


 





DVT ppx:Lovenox


Pain: Continue physical modalities in therapy and pain medications as needed to 

achieve functional pain control.  


Sleep: Monitor and address as needed. 


Bowel: Monitor and address as needed. Medications added for constipation 


Appetite: Monitor and address as needed.


Discharge planning: Pending therapy progress and care plan meeting. Will 

continue discussion with therapy team, SW, patient and family.





Restrictions/ Precautions:


Falls, Aspiration, right visual deficit





WB status: FWB





Functional Hx:


ADLs: Independent


Cognition: Independent


Mobility: No AD








Barriers to Discharge: Decreased mobility and ability to perform self care, 

balance deficits, weakness


Estimated Length of Stay: 14-21 days


Discharge Destination: Home with family

## 2019-03-10 LAB
BUN SERPL-MCNC: 29 MG/DL (ref 7–17)
BUN/CREAT SERPL: 32 %
CALCIUM SERPL-MCNC: 8.5 MG/DL (ref 8.4–10.2)
HEMOLYSIS INDEX: 7

## 2019-03-10 RX ADMIN — ENOXAPARIN SODIUM SCH MG: 100 INJECTION SUBCUTANEOUS at 10:49

## 2019-03-10 RX ADMIN — ATENOLOL SCH MG: 25 TABLET ORAL at 10:50

## 2019-03-10 RX ADMIN — ASPIRIN SCH MG: 325 TABLET ORAL at 10:49

## 2019-03-10 RX ADMIN — IPRATROPIUM BROMIDE AND ALBUTEROL SULFATE SCH AMPUL: .5; 3 SOLUTION RESPIRATORY (INHALATION) at 14:08

## 2019-03-10 RX ADMIN — FUROSEMIDE SCH MG: 20 TABLET ORAL at 10:49

## 2019-03-10 RX ADMIN — IPRATROPIUM BROMIDE AND ALBUTEROL SULFATE SCH AMPUL: .5; 3 SOLUTION RESPIRATORY (INHALATION) at 01:07

## 2019-03-10 RX ADMIN — HYDROCODONE BITARTRATE AND ACETAMINOPHEN PRN EACH: 5; 325 TABLET ORAL at 14:56

## 2019-03-10 RX ADMIN — ALBUTEROL SULFATE PRN MG: 2.5 SOLUTION RESPIRATORY (INHALATION) at 11:38

## 2019-03-10 RX ADMIN — IPRATROPIUM BROMIDE AND ALBUTEROL SULFATE SCH AMPUL: .5; 3 SOLUTION RESPIRATORY (INHALATION) at 07:44

## 2019-03-10 RX ADMIN — IPRATROPIUM BROMIDE AND ALBUTEROL SULFATE SCH AMPUL: .5; 3 SOLUTION RESPIRATORY (INHALATION) at 19:42

## 2019-03-10 RX ADMIN — POLYETHYLENE GLYCOL 3350 SCH GM: 17 POWDER, FOR SOLUTION ORAL at 10:49

## 2019-03-10 NOTE — IRU PLAN OF CARE
Interdisciplinary Plan of Care





- IP


IRU INTERDISCIPLINARY PLAN: 


                     [facility] Inpatient Rehab Unit Plan of Care





IRU Interdisciplinary Care Plan                            Start:  03/07/19 

18:27


Freq:   Admission then PRN                                 Status: Complete     




Protocol:                                                                       




 Document     03/07/19 22:16  SARBJIT  (Rec: 03/07/19 22:20  SARBJIT  [id number])


 Interdisciplinary Problem List


     Interdisciplinary Problem List


      Interdisciplinary Problem List             Impaired Bathing/Grooming,


       Query Text:Answers will Trigger Problems  Impaired Dressing,Impaired


       and Outcomes on Worklist.                 Mobility,Impaired Transfers,


                                                 Impaired Expression,Impaired


                                                 Safety,Medications Education,


                                                 Impaired Oxygenation


 IRU Interdisciplinary Care Plan


     Therapy Services


      Therapy Services Will Include:             Physical Therapy,Occupational


       Query Text:Patient will be seen for a     Therapy,Speech Therapy


       minimum of 3 hours of daily therapy 5     


       out of 7 days a week.  Therapy intensity  


       may be adjusted within a 7 consecutive    


       day period to effectively serve the       


       individual needs of the patient.          


     Treatment Frequency/Intensity/Duration


      Treatment Frequency                        


      Treatment Intensity                        


      Treatment Duration                         


     Problem Area: Eating/Swallowing


      Eating/Swallowing Outcomes                 


      Eating/Swallowing Interventions            


     Problem Area: Bathing/Grooming


      Bathing/Grooming Outcomes                  Improve [place] w/


                                                 Grooming


      Bathing/Grooming Interventions             ADL Training,Use of Assistive


                                                 Devices,Therapeutic Exercise,


                                                 Therapeutic Activity,


                                                 Neuromuscular Re-Education,


                                                 Patient/Caregiver Education


     Problem Area: Dressing


      Dressing Outcomes                          Improve [place] w/ UB


                                                 Dressing


      Dressing Interventions                     ADL Training,Use of Assistive


                                                 Devices,Neuromuscular Re-


                                                 Education,Therapeutic Exercise


                                                 ,Balance Work,Modalities,


                                                 Patient/Caregiver Education


     Problem Area: Mobility


      Mobility Outcomes                          Improve [place] w/ Bed


                                                 Mobility,Improve [place]


                                                 w/ Ambulation


      Mobility Interventions                     Therapeutic Exercise,


                                                 Neuromuscular Re-Ed.,Visual/


                                                 Perceptual Training,Activity


                                                 Tolerance Work,Modalities,Use


                                                 of Assistive Devices,Patient/


                                                 Caregiver Education,Bed


                                                 Mobility Work,Gait Training,


                                                 Household Mobility Work


     Problem Area: Transfers


      Transfers Outcomes                         Improve [place] w/ Bed


                                                 Transfers,Improve [place]


                                                 w/ Toilet Transfers


      Transfers Interventions                    Transfer Training,Therapeutic


                                                 Exercise,Neuromuscular Re-


                                                 Education,Visual/Perceptual


                                                 Training,Activity Tolerance


                                                 Work,Modalities,Use of


                                                 Assistive Devices,Patient/


                                                 Caregiver Education


     Problem Area: Bowel/Bladder Managment


      Bowel/Bladder Outcomes                     


      Bowel/Bladder Interventions                


     Problem Area: Toileting


      Toileting Outcomes                         Improve [place] w/


                                                 Toileting


      Toileting Interventions                    ADL Training,Balance Work,Use


                                                 of Assistive Devices,Patient/


                                                 Caregiver Education


     Problem Area: Nutrition


      Nutrition Outcomes                         Understand and Comply w/ Diet


      Nutrition Interventions                    Nutritional Counseling,Fluid


                                                 Management,Patient/Caregiver


                                                 Education


     Problem Area: Comprehension


      Comprehension Outcomes                     Improve Comprehension,Follow


                                                 Commands,Improve Communication


      Comprehension Interventions                Use of Gestures,Patient/


                                                 Caregiver Education


     Problem Area: Expression


      Expression Outcomes                        Improve Intelligibility,


                                                 Improve Vocal Quality,Improve


                                                 Verbalization


      Expression Interventions                   Expressive Language,Writing


                                                 Tasks,Augmentative


                                                 Communication,Patient/


                                                 Caregiver Education


     Problem Area: Problem Solving


      Problem Solving Outcomes                   


      Problem Solving Interventions              


     Problem Area: Memory


      Memory Outcomes                            


      Memory Interventions                       


     Problem Area: Pain Management


      Pain Management Outcomes                   


      Pain Management Interventions              


     Problem Area: Knowledge Deficits


      Knowledge Deficits Outcomes                


      Knowledge Deficits Interventions           


     Problem Area: Skin/Tissue Integrity


      Skin/Tissue Integrity Outcomes             


      Skin/Tissue Integrity Interventions        


     Problem Area: Social Interaction


      Social Interaction Outcomes                Exhibit Appropriate Social


                                                 Skills


      Social Interaction Interventions           Social Skills Training,Stress


                                                 Management


     Problem Area: Adjustment to Disability


      Adjustment to Disability Outcomes          Exhibit/Verbalize Decreased


                                                 Depression,Exhibit/Verbalize


                                                 Decreased Anxiety,Demonstrated


                                                 Improved Motivation/


                                                 Participation


      Adjustment to Disability Interventions     Resource Education


     Problem Area: Discharge Concerns


      Discharge Concerns Outcomes                Discharge w/ Necessary


                                                 Equipment,Have Home Health/


                                                 Outpatient Services


      Discharge Concerns Interventions           Discharge Planning,Equipment


                                                 Assessment, Acquisition and


                                                 Placement,Family/Caregiver


                                                 Conference,Patient/Family/


                                                 Caregiver Counseling,Family/


                                                 Caregiver Training


     Problem Area: Community Reintegration


      Community Reintegration Outcomes           Demonstrate Understanding of


                                                 Community Resources


      Community Reintegration Interventions      Home Evaluation,Activity


                                                 Tolerance Work,Leisure


                                                 Activity


     Problem Area: Home Management


      Home Management Outcomes                   Improve [place] w/ Home


                                                 Management


      Home Management Interventions              Activity Tolerance Work,


                                                 Leisure Skills Development


     Problem Area: Safety


      Safety Outcomes                            Provide Safe Environment,


                                                 Perform Selfcare Safely,


                                                 Demonstrate Good Safety w/


                                                 Transfers/Mobility


      Safety Interventions                       Identify Fall Risk,[place] Pt.


                                                 to Environment,Reduce


                                                 Environmental Hazards,Neuro


                                                 Check Assessment,Implement


                                                 Mechanical Devices, i.e. Chair


                                                 Alarm (Post Fall Update),Re-


                                                 Educate Patient/Caregiver for


                                                 Safety (Post Fall Update)


     Problem Area: Medication Education


      Medication Education Outcomes              Patient/Caregiver will


                                                 Verbalize Understanding of


                                                 Medications


      Medication Education Interventions         Explain Administration/Side


                                                 Effects/Interactions


     Problem Area: Diabetes Education


      Diabetes Education Outcomes                


      Diabetes Education Interventions           


     Problem Area: Oxygenation


      Oxygenation Outcomes                       Maintain Adequate Oxygenation


      Oxygenation Interventions                  Assess Respiratory Status,


                                                 Monitor Diagnostic Results,


                                                 Encourage Coughing and Deep


                                                 Breathing,Elevate Head of Bed


     Problem Area: Cardiovascular


      Cardiovascular Outcomes                    


      Cardiovascular Interventions               


     Physician Only


      Medical Prognosis and Rehabilitation       


       Potential (Completed by Physician)        


IRU Interdisciplinary Care Plan                            Start:  03/10/19 

15:18


Freq:                                                      Status: Active       




Protocol:                                                                       




 Document     03/10/19 15:18  TH  (Rec: 03/10/19 15:26  TH  [id number])


 Interdisciplinary Problem List


     Interdisciplinary Problem List


      Interdisciplinary Problem List             Impaired Eating/Swallowing,


       Query Text:Answers will Trigger Problems  Impaired Bathing/Grooming,


       and Outcomes on Worklist.                 Impaired Dressing,Impaired


                                                 Mobility,Impaired Transfers,


                                                 Impaired Toileting,Impaired


                                                 Comprehension,Impaired


                                                 Expression,Impaired Problem


                                                 Solving,Impaired Memory,


                                                 Knowledge Deficits,Discharge


                                                 Concerns,Impaired Safety,


                                                 Medications Education,Impaired


                                                 Oxygenation,Impaired


                                                 Cardiovascular System


 IRU Interdisciplinary Care Plan


     Therapy Services


      Therapy Services Will Include:             Physical Therapy,Occupational


       Query Text:Patient will be seen for a     Therapy,Speech Therapy


       minimum of 3 hours of daily therapy 5     


       out of 7 days a week.  Therapy intensity  


       may be adjusted within a 7 consecutive    


       day period to effectively serve the       


       individual needs of the patient.          


     Treatment Frequency/Intensity/Duration


      Treatment Frequency                        5X/week


      Treatment Intensity                        3 hours per day


      Treatment Duration                         14-21 days


     Problem Area: Eating/Swallowing


      Eating/Swallowing Outcomes                 Consume Least Restrictive Diet


                                                 ,Improve Labial ROM/Strength,


                                                 Improve Lingual ROM/Strength,


                                                 Feed Self


      Eating/Swallowing Interventions            Dysphagia Training,Optimal


                                                 Positioning,Compensatory


                                                 Strategies,Neuromuscular Re-


                                                 Education,Patient/Caregiver


                                                 Education


     Problem Area: Bathing/Grooming


      Bathing/Grooming Outcomes                  Improve [place] w/


                                                 Grooming,Improve [place]


                                                 w/ Bathing


      Bathing/Grooming Interventions             ADL Training,Use of Assistive


                                                 Devices,Therapeutic Exercise,


                                                 Therapeutic Activity,


                                                 Neuromuscular Re-Education,


                                                 Patient/Caregiver Education


     Problem Area: Dressing


      Dressing Outcomes                          Improve [place] w/ UB


                                                 Dressing


      Dressing Interventions                     ADL Training,Use of Assistive


                                                 Devices,Neuromuscular Re-


                                                 Education,Therapeutic Exercise


                                                 ,Balance Work,Modalities,


                                                 Patient/Caregiver Education


     Problem Area: Mobility


      Mobility Outcomes                          Improve [place] w/ Bed


                                                 Mobility,Improve [place]


                                                 w/ Ambulation


      Mobility Interventions                     Therapeutic Exercise,


                                                 Neuromuscular Re-Ed.,Visual/


                                                 Perceptual Training,Activity


                                                 Tolerance Work,Modalities,Use


                                                 of Assistive Devices,Patient/


                                                 Caregiver Education,Bed


                                                 Mobility Work,Gait Training,


                                                 Household Mobility Work


     Problem Area: Transfers


      Transfers Outcomes                         Improve [place] w/ Bed


                                                 Transfers,Improve [place]


                                                 w/ Toilet Transfers


      Transfers Interventions                    Transfer Training,Therapeutic


                                                 Exercise,Neuromuscular Re-


                                                 Education,Visual/Perceptual


                                                 Training,Activity Tolerance


                                                 Work,Modalities,Use of


                                                 Assistive Devices,Patient/


                                                 Caregiver Education


     Problem Area: Bowel/Bladder Managment


      Bowel/Bladder Outcomes                     


      Bowel/Bladder Interventions                


     Problem Area: Toileting


      Toileting Outcomes                         Improve [place] w/


                                                 Toileting


      Toileting Interventions                    ADL Training,Balance Work,Use


                                                 of Assistive Devices,Patient/


                                                 Caregiver Education


     Problem Area: Nutrition


      Nutrition Outcomes                         Understand and Comply w/ Diet


      Nutrition Interventions                    Nutritional Counseling,Fluid


                                                 Management,Patient/Caregiver


                                                 Education


     Problem Area: Comprehension


      Comprehension Outcomes                     Improve Comprehension,Follow


                                                 Commands,Improve Communication


      Comprehension Interventions                Use of Gestures,Patient/


                                                 Caregiver Education


     Problem Area: Expression


      Expression Outcomes                        Improve Intelligibility,


                                                 Improve Vocal Quality,Improve


                                                 Verbalization


      Expression Interventions                   Expressive Language,Writing


                                                 Tasks,Augmentative


                                                 Communication,Patient/


                                                 Caregiver Education


     Problem Area: Problem Solving


      Problem Solving Outcomes                   Improve Problem Solving


      Problem Solving Interventions              Cognitive Training,Safety


                                                 Education,Patient/Caregiver


                                                 Education


     Problem Area: Memory


      Memory Outcomes                            Use Memory Aids


      Memory Interventions                       Cognitive Training,Patient/


                                                 Caregiver Education


     Problem Area: Pain Management


      Pain Management Outcomes                   


      Pain Management Interventions              


     Problem Area: Knowledge Deficits


      Knowledge Deficits Outcomes                Verbalize Understanding of S/S


                                                 of Stroke


      Knowledge Deficits Interventions           Disease/Injury/Sx.


                                                 Intervention Education,Safety


                                                 Education


     Problem Area: Skin/Tissue Integrity


      Skin/Tissue Integrity Outcomes             


      Skin/Tissue Integrity Interventions        


     Problem Area: Social Interaction


      Social Interaction Outcomes                Exhibit Appropriate Social


                                                 Skills


      Social Interaction Interventions           Social Skills Training,Stress


                                                 Management


     Problem Area: Adjustment to Disability


      Adjustment to Disability Outcomes          Exhibit/Verbalize Decreased


                                                 Depression,Exhibit/Verbalize


                                                 Decreased Anxiety,Demonstrated


                                                 Improved Motivation/


                                                 Participation


      Adjustment to Disability Interventions     Resource Education


     Problem Area: Discharge Concerns


      Discharge Concerns Outcomes                Discharge w/ Necessary


                                                 Equipment,Have Home Health/


                                                 Outpatient Services


      Discharge Concerns Interventions           Discharge Planning,Equipment


                                                 Assessment, Acquisition and


                                                 Placement,Family/Caregiver


                                                 Conference,Patient/Family/


                                                 Caregiver Counseling,Family/


                                                 Caregiver Training


     Problem Area: Community Reintegration


      Community Reintegration Outcomes           


      Community Reintegration Interventions      


     Problem Area: Home Management


      Home Management Outcomes                   Improve [place] w/ Home


                                                 Management


      Home Management Interventions              Activity Tolerance Work,


                                                 Leisure Skills Development


     Problem Area: Safety


      Safety Outcomes                            Provide Safe Environment,


                                                 Perform Selfcare Safely,


                                                 Demonstrate Good Safety w/


                                                 Transfers/Mobility


      Safety Interventions                       Identify Fall Risk,[place] Pt.


                                                 to Environment,Reduce


                                                 Environmental Hazards,Neuro


                                                 Check Assessment,Implement


                                                 Mechanical Devices, i.e. Chair


                                                 Alarm (Post Fall Update),Re-


                                                 Educate Patient/Caregiver for


                                                 Safety (Post Fall Update)


     Problem Area: Medication Education


      Medication Education Outcomes              Patient/Caregiver will


                                                 Verbalize Understanding of


                                                 Medications


      Medication Education Interventions         Explain Administration/Side


                                                 Effects/Interactions


     Problem Area: Diabetes Education


      Diabetes Education Outcomes                


      Diabetes Education Interventions           


     Problem Area: Oxygenation


      Oxygenation Outcomes                       Maintain Adequate Oxygenation


      Oxygenation Interventions                  Assess Respiratory Status,


                                                 Monitor Diagnostic Results,


                                                 Encourage Coughing and Deep


                                                 Breathing,Elevate Head of Bed


     Problem Area: Cardiovascular


      Cardiovascular Outcomes                    Maintain or Improve


                                                 Cardiovascular Status


      Cardiovascular Interventions               Assess Vital Signs at least


                                                 Every 4 hours


     Physician Only


      Medical Prognosis and Rehabilitation       


       Potential (Completed by Physician)      





Patient has a good medical prognosis and good rehabilitation potential.  Since 

admission she has developed ability to abduct her shoulder and fingers which is 

positive sign.  Medical comorbidities are being managed and medications will be 

added back slowly as they are needed.  We are continuing to monitor her re

spiratory function, CHF seems stable.  Dysphasia and dysphagia are persistant 

thus far.   





This plan of care has been developed based on the findings from the pre-

admission assessment, post admission physician evaluation, information gathered 

from the assessments from all therapy disciplines and other pertinent 

clinicians. The plan of care has been reviewed and discussed in collaboration 

with the interdisciplinary team. The plan of care will be reviewed and updated 

at least weekly.

## 2019-03-11 LAB
BUN SERPL-MCNC: 21 MG/DL (ref 7–17)
BUN/CREAT SERPL: 23 %
CALCIUM SERPL-MCNC: 8.7 MG/DL (ref 8.4–10.2)
HEMOLYSIS INDEX: 8

## 2019-03-11 RX ADMIN — ASPIRIN SCH MG: 325 TABLET ORAL at 08:11

## 2019-03-11 RX ADMIN — IPRATROPIUM BROMIDE AND ALBUTEROL SULFATE SCH AMPUL: .5; 3 SOLUTION RESPIRATORY (INHALATION) at 14:58

## 2019-03-11 RX ADMIN — FUROSEMIDE SCH MG: 20 TABLET ORAL at 08:11

## 2019-03-11 RX ADMIN — IPRATROPIUM BROMIDE AND ALBUTEROL SULFATE SCH AMPUL: .5; 3 SOLUTION RESPIRATORY (INHALATION) at 10:15

## 2019-03-11 RX ADMIN — IPRATROPIUM BROMIDE AND ALBUTEROL SULFATE SCH AMPUL: .5; 3 SOLUTION RESPIRATORY (INHALATION) at 20:05

## 2019-03-11 RX ADMIN — ATENOLOL SCH MG: 25 TABLET ORAL at 08:11

## 2019-03-11 RX ADMIN — IPRATROPIUM BROMIDE AND ALBUTEROL SULFATE SCH: .5; 3 SOLUTION RESPIRATORY (INHALATION) at 03:02

## 2019-03-11 RX ADMIN — ENOXAPARIN SODIUM SCH MG: 100 INJECTION SUBCUTANEOUS at 08:11

## 2019-03-11 RX ADMIN — IPRATROPIUM BROMIDE AND ALBUTEROL SULFATE SCH AMPUL: .5; 3 SOLUTION RESPIRATORY (INHALATION) at 06:15

## 2019-03-11 RX ADMIN — HYDROCODONE BITARTRATE AND ACETAMINOPHEN PRN EACH: 5; 325 TABLET ORAL at 10:17

## 2019-03-11 RX ADMIN — POLYETHYLENE GLYCOL 3350 SCH GM: 17 POWDER, FOR SOLUTION ORAL at 08:11

## 2019-03-11 RX ADMIN — IPRATROPIUM BROMIDE AND ALBUTEROL SULFATE SCH AMPUL: .5; 3 SOLUTION RESPIRATORY (INHALATION) at 00:56

## 2019-03-11 RX ADMIN — HYDROCODONE BITARTRATE AND ACETAMINOPHEN PRN EACH: 5; 325 TABLET ORAL at 19:21

## 2019-03-11 NOTE — PROGRESS NOTE
Subjective


Date of service: 03/11/19


Principal diagnosis: Left CVA


Interval history: 


67 YO Female with Obesity, HTN, COPD, CHF presented to ED for evaluation for 

right sided weakness and decreased responsiveness. Patient unable to provide 

history due to condition, per the son the patient experienced an acute onset of 

right arm and leg weakness, and became unresponsive while at a routine appoin

tment for her  at his pulmonologist's office. EMS was notified and upon 

arrival the patient was found to have neurologic deficit resulting in a code 

stroke, found to have Acute CVA and was treated with TPA in ED.  She was 

transferred to the ICU afterwards for monitoring.  MRI brain showed acute left 

basal ganglia/left periventricular white matter infarct and a small acute 

infarct in the left cerebellum. Prior to transfer she developed abdominal pain 

and was evaluated and found to have gallstones.  After she was medically cleared

she was transferred for further rehab.





Patient is participating in therapy and making reasonable progress. Taking rest 

breaks as needed.   +BM.  Denies pain, palpitations, dyspnea, cough, N/V,  or 

joint pain.  Admits to intermittent colicky RUQ pain but not currently.   BP and

tachycardia better since starting back on antihypertensive. BUN improving.    W

alking with therapy, on stairs. RUE shoulder abduction and finger abduction 

improved.  Speech about the same.








All records, vitals, labs and medications were reviewed.  No other issues per 

patient, nursing or therapy.





Objective





- Exam


Narrative Exam: 


MUSCULOSKELETAL SPECIALTY EXAM





CONSTITUTIONAL:


Well developed, well nourished, appropriately groomed obese. 


RIGHT hand dominant.





RESPIRATORY:


Bilateral wheeze to ascultation, no increased work of breathing, on 2L O2 at 

home 24/7 





CARDIOVASCULAR:  


Regular Rhythm,tachycardic, no swelling, edema or tenderness in BUE or BLE. All 

extremities warm.  





GI: 


+ bowel sounds, soft, NTTP, nondistended.





INTEGUMENTARY:


Normal, no lesion, rash, masses or bruising noted in extremities. 





MUSCULOSKELETAL:


BUE and BLE normal without defect, crepitus, subluxation, effusion, arthritic ch

anges or TTP. 





         SA       EF      WE      EE       FF      FA      HF      KE      ADF  

   EHL      APF


R      4-/5     4-/5    4-/5     4-/5     3/5    4-/5   4-/5     4-/5    4-/5   

   4-/5      4-/5


L        5/5      5/5     5/5     5/5      5/5     5 /5    5/5     5/5      5/5 

     5/5         5/5





ROM decreased on right, normal on left


Tone normal





NEURO:


CN II - XII grossly intact except: visual field decreased on right, right facial

droop, decreased shoulder shrug and head rotation on right





Sensation intact in all extremities without extinction. 


No tremor noted in 4 extremities.  


Naming and repetition impaired. Right inattention (listed as left in error in 

prior note)


Follows 1 step commands.


Dysphasia and Dysarthria present


Dysphagia 








POSTURE and GAIT:


Sitting posture good. Balance appears reasonable in bed, decreased with 

standing.  Gait deferred until seen with therapy.





PSYCH:


Alert, oriented x3, affect appears flat. Insight appears intact.











- Constitutional


Vitals: 


                               Vital Signs - 12hr











  03/11/19 03/11/19 03/11/19





  06:16 06:31 07:38


 


Temperature   36.7 C


 


Pulse Rate   97 H


 


Pulse Rate [ 97 H 92 H 





Bilateral   





Throughout]   


 


Respiratory   18





Rate   


 


Respiratory 26 H 20 





Rate [Bilateral   





Throughout]   


 


Blood Pressure   136/72


 


Blood Pressure   





[Left]   


 


O2 Sat by Pulse   94





Oximetry   














  03/11/19 03/11/19 03/11/19





  10:00 11:17 14:00


 


Temperature  36.5 C 


 


Pulse Rate  92 H 


 


Pulse Rate [   88





Bilateral   





Throughout]   


 


Respiratory  18 





Rate   


 


Respiratory   17





Rate [Bilateral   





Throughout]   


 


Blood Pressure   


 


Blood Pressure  116/81 





[Left]   


 


O2 Sat by Pulse 97 97 





Oximetry   














  03/11/19 03/11/19





  14:59 15:42


 


Temperature  


 


Pulse Rate  95 H


 


Pulse Rate [ 88 





Bilateral  





Throughout]  


 


Respiratory  18





Rate  


 


Respiratory 17 





Rate [Bilateral  





Throughout]  


 


Blood Pressure  


 


Blood Pressure  137/74





[Left]  


 


O2 Sat by Pulse  97





Oximetry  














- Allied health notes


Allied health notes reviewed: nursing, PT, ST, OT, RT


FIMS assessment as documented by PT/OT/ST: 


Grooming





Patient cleans teeth/dentures:   No


Patient chapman/brushes hair:      Yes


Patient washes, rinses and       Yes


dries face:                      


Patient washes, rinses and       Yes


dries hands:                     


Patient shaves:                  No


Patient performs (no make-up/    3/4 (75%)


shaving):                        


Grooming FIM Score               4. Minimal Assistance (Patient = 75% or more.


                                 Needs touching.)





Toileting





Toileting Device                 Bedside Commode,Commode over Toilet


Patient able to:                 Adjust clothes before,Clean self,Adjust clothes


                                 after


Patient able to perform:         3/3 (100%)


Toileting FIM Score              5. Supv./Set-Up (Needs stand-by, set-up,


                                 applying prosth/orth.)





Social interaction/Memory/Problem solving





Social Interaction FIM Score     6. Mod. [place] (Mostly appropriate. May


                                 need meds. No supv.)


Memory FIM Score                 3. Moderate Assistance (Recognizes and 

remembers


                                 50-74%.)


Problem Solving FIM Score        3. Moderate Assistance (Solves routine problems


                                 50-74%.)





Transfers





Mode of Locomotion:              Walking


Bed/Chair/Wheelchair Transfers   4. Minimal Assistance (Patient = 75% or more.


FIM Score                        Needs touching.)


Toilet Transfers FIM Score       4. Minimal Assistance (Patient = 75% or more.


                                 Needs touching.)


Patient transferred to:          Shower


Shower Transfers FIM Score       4. Minimal Assistance (Patient = 75% or more.


                                 Needs touching.)





Locomotion- Stairs





Device used on Stairs            Handrail/s


Number of Stairs Ascended/       12


Descended                        


Patient used handrail/support:   Yes


Stairs FIM Score                 4. Minimal Assistance (Patient = 75% or more,


                                 touching. 12-14 stairs.)





Locomotion- walk/wheelchair





Most Frequent Mode of            Walking


Locomotion:                      


Ambulation Distance              170


Walking FIM Score                4. Minimal Assistance (Patient = 75% or more.


                                 Minimum of 150 ft.)


Wheelchair Propulsion Distance   170


Wheelchair FIM Score             5. Supervision (Minimum 150 ft. supv./cues or 

50


                                 ft. independently.)





Eating





Eating FIM Score                 5. Supervision/Set-Up (Needs help w/ 

containers,


                                 cutting meat, etc.)





Dressing-Upper body





Patient retrieves clothing       No


items:                           


Patient applies/removes UE       n/a


prosthesis or orthosis:          


Upper Body Dressing FIM Score    4. Minimal Assistance (Patient = 75% or more.


                                 Needs touching.)





Dressing-lower body





Patient retrieves clothing       No


items:                           


Patient applies/removes LE       n/a


prosthesis or orthosis:          


Lower Body Dressing FIM Score    4. Minimal Assistance (Patient = 75% or more.


                                 Needs touching.)











- Labs


CBC & Chem 7: 


                                 03/09/19 06:55





                                 03/11/19 07:21


Labs: 


                         Laboratory Results - last 72 hr











  03/09/19 03/09/19 03/10/19





  06:55 06:55 07:58


 


WBC  9.7  


 


RBC  4.91  


 


Hgb  14.3  


 


Hct  42.4  


 


MCV  86  


 


MCH  29  


 


MCHC  34  


 


RDW  15.1  


 


Plt Count  117 L  


 


Lymph % (Auto)  4.3 L  


 


Mono % (Auto)  7.9 H  


 


Eos % (Auto)  1.3  


 


Baso % (Auto)  0.2  


 


Lymph #  0.4 L  


 


Mono #  0.8  


 


Eos #  0.1  


 


Baso #  0.0  


 


Seg Neutrophils %  86.3 H  


 


Seg Neutrophils #  8.4 H  


 


Sodium   138  141


 


Potassium   3.8  3.9


 


Chloride   94.8 L  99.7


 


Carbon Dioxide   30  29


 


Anion Gap   17  16


 


BUN   36 H  29 H


 


Creatinine   1.0  0.9


 


Estimated GFR   55  > 60


 


BUN/Creatinine Ratio   36  32


 


Glucose   128 H  115 H


 


Calcium   8.7  8.5


 


Magnesium   2.80 H 














  03/11/19





  07:21


 


WBC 


 


RBC 


 


Hgb 


 


Hct 


 


MCV 


 


MCH 


 


MCHC 


 


RDW 


 


Plt Count 


 


Lymph % (Auto) 


 


Mono % (Auto) 


 


Eos % (Auto) 


 


Baso % (Auto) 


 


Lymph # 


 


Mono # 


 


Eos # 


 


Baso # 


 


Seg Neutrophils % 


 


Seg Neutrophils # 


 


Sodium  141


 


Potassium  4.0


 


Chloride  103.5


 


Carbon Dioxide  31 H


 


Anion Gap  11


 


BUN  21 H


 


Creatinine  0.9


 


Estimated GFR  > 60


 


BUN/Creatinine Ratio  23


 


Glucose  123 H


 


Calcium  8.7


 


Magnesium 














Assessment and Plan


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 

prognosis and need to control HTN/DM/HLD. Will monitor for shoulder hand 

syndrome and post stroke depression. Former Tob use


I69.321 Dysphasia: SLP to work to improve ability to communicate with strategies

 and repetition


I69.391 Dysphagia: on modified diet, advance as able. EStim, FEES, MBS as needed


I69.322 Dysarthria: SLP to work on improving motor control for speech 

improvement


I69.312 Visuospatial deficit after CVA: monitor right side, cue towards right, 

scanning strategies


I10 Hypertension: Cont atenolol, monitor BP and adjust as needed


I50.9 Heart Failure: BNP normal, lasix restarted at low dose


J44.9 COPD: On home O2, continue inhalers/nebs and adjust as needed, monitor for

 worsening.  CXR in AM, was being treated with Abx prior to CVA and did not 

finish, wheezing now 


E86.0 Dehydration: 1L NS, encouraged PO intake, monitor


Z73.6 ADL dysfunction: OT will work on improving ability to perform ADLs 

(including assistive devices) to increase independence and decrease caregiver 

burden and improve functional transfers and mobility training.


R26.2 Difficulty walking: PT will work on gait training and proper use of 

assistive devices and advance as appropriate to use of stairs and outside 

ambulation on uneven surfaces.


R26.81 Unsteadiness on feet: PT will work on improving static and dynamic 

sitting and standing balance as well as proper use of assistive devices to 

decrease risk of falls.


R26.89 Abnormality of gait: PT will work to improve safety and efficiency of 

gait through neuromotor training and gait training along with instruction on 

proper use of assistive devices.


M62.81 Muscle weakness: PT & OT will work on strengthening exercises to improve 

functional strength including mixture of closed and open kinetic chain ex

ercises.


R53.81 Debility: PT & OT will work on improving overall functional status to 

improve participation with ADLs, mobility and social involvement.


R53.83 Fatigue: PT & OT will work on improving endurance through aerobic 

exercises and therapeutic activity while monitoring patients tolerance for 

activity and vital signs as needed.


 





DVT ppx:Lovenox


Pain: Continue physical modalities in therapy and pain medications as needed to 

achieve functional pain control.  


Sleep: Monitor and address as needed. 


Bowel: Monitor and address as needed. Medications added for constipation 


Appetite: Monitor and address as needed.


Discharge planning: Pending therapy progress and care plan meeting. Will co

ntinue discussion with therapy team, SW, patient and family.





Restrictions/ Precautions:


Falls, Aspiration, right visual deficit





WB status: FWB





Functional Hx:


ADLs: Independent


Cognition: Independent


Mobility: No AD








Barriers to Discharge: Decreased mobility and ability to perform self care, nathan

nce deficits, weakness


Estimated Length of Stay: 14-21 days


Discharge Destination: Home with family

## 2019-03-12 LAB
BUN SERPL-MCNC: 15 MG/DL (ref 7–17)
BUN/CREAT SERPL: 19 %
CALCIUM SERPL-MCNC: 8.9 MG/DL (ref 8.4–10.2)
HCT VFR BLD CALC: 38.8 % (ref 30.3–42.9)
HEMOLYSIS INDEX: 0
HGB BLD-MCNC: 12.8 GM/DL (ref 10.1–14.3)
MCHC RBC AUTO-ENTMCNC: 33 % (ref 30–34)
MCV RBC AUTO: 87 FL (ref 79–97)
PLATELET # BLD: 112 K/MM3 (ref 140–440)
RBC # BLD AUTO: 4.45 M/MM3 (ref 3.65–5.03)

## 2019-03-12 RX ADMIN — POLYETHYLENE GLYCOL 3350 SCH GM: 17 POWDER, FOR SOLUTION ORAL at 07:49

## 2019-03-12 RX ADMIN — ENOXAPARIN SODIUM SCH MG: 100 INJECTION SUBCUTANEOUS at 07:49

## 2019-03-12 RX ADMIN — IPRATROPIUM BROMIDE AND ALBUTEROL SULFATE SCH: .5; 3 SOLUTION RESPIRATORY (INHALATION) at 04:56

## 2019-03-12 RX ADMIN — IPRATROPIUM BROMIDE AND ALBUTEROL SULFATE SCH AMPUL: .5; 3 SOLUTION RESPIRATORY (INHALATION) at 14:28

## 2019-03-12 RX ADMIN — ATENOLOL SCH MG: 25 TABLET ORAL at 07:49

## 2019-03-12 RX ADMIN — IPRATROPIUM BROMIDE AND ALBUTEROL SULFATE SCH AMPUL: .5; 3 SOLUTION RESPIRATORY (INHALATION) at 09:45

## 2019-03-12 RX ADMIN — ALBUTEROL SULFATE PRN MG: 2.5 SOLUTION RESPIRATORY (INHALATION) at 04:56

## 2019-03-12 RX ADMIN — HYDROCODONE BITARTRATE AND ACETAMINOPHEN PRN EACH: 5; 325 TABLET ORAL at 04:59

## 2019-03-12 RX ADMIN — ASPIRIN SCH MG: 325 TABLET ORAL at 07:49

## 2019-03-12 RX ADMIN — FUROSEMIDE SCH MG: 20 TABLET ORAL at 07:49

## 2019-03-12 RX ADMIN — IPRATROPIUM BROMIDE AND ALBUTEROL SULFATE SCH AMPUL: .5; 3 SOLUTION RESPIRATORY (INHALATION) at 20:10

## 2019-03-12 RX ADMIN — HYDROCODONE BITARTRATE AND ACETAMINOPHEN PRN EACH: 5; 325 TABLET ORAL at 18:39

## 2019-03-12 NOTE — PROGRESS NOTE
Subjective


Date of service: 03/12/19


Principal diagnosis: Left CVA


Interval history: 


69 YO Female with Obesity, HTN, COPD, CHF presented to ED for evaluation for 

right sided weakness and decreased responsiveness. Patient unable to provide 

history due to condition, per the son the patient experienced an acute onset of 

right arm and leg weakness, and became unresponsive while at a routine appoin

tment for her  at his pulmonologist's office. EMS was notified and upon 

arrival the patient was found to have neurologic deficit resulting in a code 

stroke, found to have Acute CVA and was treated with TPA in ED.  She was 

transferred to the ICU afterwards for monitoring.  MRI brain showed acute left 

basal ganglia/left periventricular white matter infarct and a small acute 

infarct in the left cerebellum. Prior to transfer she developed abdominal pain 

and was evaluated and found to have gallstones.  After she was medically cleared

she was transferred for further rehab.





Patient is participating in therapy and making reasonable progress. Taking rest 

breaks as needed.   +BM.  Denies pain, palpitations, dyspnea, cough, N/V,  or 

joint pain.  Admits to intermittent colicky RUQ pain but not currently.   BP and

tachycardia better since starting back on antihypertensive. BUN and Mg normaliz

ed.  Plts still low - normal prior to transfer to rehab.  Walking with therapy, 

on stairs. RUE shoulder abduction and finger abduction improved.  Speech about 

the same.





Discussed in Team Conference - improving with mobility and ADLs.  Lagging with 

SLP.  May end up being ready for discharge sooner than expected.  Asked therapy 

to challenge her with higher level activities to ensure she is able to perform 

at the level she needs to be safe at home.  





Will consult IM for management of HTN, CHF, COPD, Electrolyte abnormalities and 

thrombocytopenia - appreciate their assistance.  


Dr Rivera has agreed to fill in for me starting Saturday 3/16 while I am at 

conference - appreciate his assistance.





All records, vitals, labs and medications were reviewed.  No other issues per 

patient, nursing or therapy.





Objective





- Exam


Narrative Exam: 


MUSCULOSKELETAL SPECIALTY EXAM





CONSTITUTIONAL:


Well developed, well nourished, appropriately groomed obese. 


RIGHT hand dominant.





RESPIRATORY:


Bilateral wheeze to ascultation, no increased work of breathing, on 2L O2 at 

home 24/7 





CARDIOVASCULAR:  


Regular Rhythm,tachycardic, no swelling, edema or tenderness in BUE or BLE. All 

extremities warm.  





GI: 


+ bowel sounds, soft, NTTP, nondistended.





INTEGUMENTARY:


Normal, no lesion, rash, masses or bruising noted in extremities. 





MUSCULOSKELETAL:


BUE and BLE normal without defect, crepitus, subluxation, effusion, arthritic 

changes or TTP. 





         SA       EF      WE      EE       FF      FA      HF      KE      ADF  

   EHL      APF


R       4/5      4/5     4/5      4/5     4/5     4/5     4/5     4/5     4/5   

   4/5      4/5


L        5/5      5/5     5/5     5/5      5/5     5 /5    5/5     5/5      5/5 

     5/5      5/5





ROM decreased on right but improving, normal on left


Tone normal





NEURO:


CN II - XII grossly intact except: visual field decreased on right, right facial

droop, decreased shoulder shrug and head rotation on right





Sensation intact in all extremities without extinction. 


No tremor noted in 4 extremities.  


Naming and repetition impaired. Right inattention 


Follows 1 step commands.


Dysphasia and Dysarthria present


Dysphagia 








POSTURE and GAIT:


Sitting posture good. Balance appears reasonable.  Gait with RW fairly steady 

with no LOB when I observed.





PSYCH:


Alert, oriented x3, affect appears flat. Insight appears intact.











- Constitutional


Vitals: 


                               Vital Signs - 12hr











  03/12/19 03/12/19 03/12/19





  00:22 00:26 03:39


 


Temperature  36.9 C 36.3 C L


 


Pulse Rate  92 H 86


 


Pulse Rate [   





Bilateral   





Throughout]   


 


Pulse Rate [   





Bilateral]   


 


Respiratory 17 20 20





Rate   


 


Respiratory   





Rate [Bilateral   





Throughout]   


 


Respiratory   





Rate [Bilateral   





]   


 


Blood Pressure  137/79 129/74


 


O2 Sat by Pulse  96 98





Oximetry   














  03/12/19 03/12/19 03/12/19





  04:58 05:06 07:35


 


Temperature   36.6 C


 


Pulse Rate   63


 


Pulse Rate [ 89 91 H 





Bilateral   





Throughout]   


 


Pulse Rate [   





Bilateral]   


 


Respiratory   30 H





Rate   


 


Respiratory 21 17 





Rate [Bilateral   





Throughout]   


 


Respiratory   





Rate [Bilateral   





]   


 


Blood Pressure   146/82


 


O2 Sat by Pulse   83 L





Oximetry   














  03/12/19 03/12/19





  09:46 09:47


 


Temperature  


 


Pulse Rate  


 


Pulse Rate [ 88 





Bilateral  





Throughout]  


 


Pulse Rate [ 88 





Bilateral]  


 


Respiratory  





Rate  


 


Respiratory 16 





Rate [Bilateral  





Throughout]  


 


Respiratory 16 





Rate [Bilateral  





]  


 


Blood Pressure  


 


O2 Sat by Pulse  94





Oximetry  














- Allied health notes


Allied health notes reviewed: nursing, PT, ST, OT, RT


FIMS assessment as documented by PT/OT/ST: 


Grooming





Patient cleans teeth/dentures:   No


Patient chapman/brushes hair:      Yes


Patient washes, rinses and       Yes


dries face:                      


Patient washes, rinses and       Yes


dries hands:                     


Patient shaves:                  No


Patient performs (no make-up/    3/4 (75%)


shaving):                        


Grooming FIM Score               4. Minimal Assistance (Patient = 75% or more.


                                 Needs touching.)





Toileting





Toileting Device                 Bedside Commode,Commode over Toilet


Patient able to:                 Adjust clothes before,Clean self,Adjust clothes


                                 after


Patient able to perform:         3/3 (100%)


Toileting FIM Score              5. Supv./Set-Up (Needs stand-by, set-up,


                                 applying prosth/orth.)





Social interaction/Memory/Problem solving





Social Interaction FIM Score     6. Mod. [place] (Mostly appropriate. May


                                 need meds. No supv.)


Memory FIM Score                 3. Moderate Assistance (Recognizes and remember

s


                                 50-74%.)


Problem Solving FIM Score        3. Moderate Assistance (Solves routine problems


                                 50-74%.)





Transfers





Mode of Locomotion:              Walking


Bed/Chair/Wheelchair Transfers   4. Minimal Assistance (Patient = 75% or more.


FIM Score                        Needs touching.)


Toilet Transfers FIM Score       4. Minimal Assistance (Patient = 75% or more.


                                 Needs touching.)


Patient transferred to:          Shower


Shower Transfers FIM Score       4. Minimal Assistance (Patient = 75% or more.


                                 Needs touching.)





Locomotion- Stairs





Device used on Stairs            Handrail/s


Number of Stairs Ascended/       12


Descended                        


Patient used handrail/support:   Yes


Stairs FIM Score                 4. Minimal Assistance (Patient = 75% or more,


                                 touching. 12-14 stairs.)





Locomotion- walk/wheelchair





Most Frequent Mode of            Walking


Locomotion:                      


Ambulation Distance              170


Walking FIM Score                4. Minimal Assistance (Patient = 75% or more.


                                 Minimum of 150 ft.)


Wheelchair Propulsion Distance   170


Wheelchair FIM Score             5. Supervision (Minimum 150 ft. supv./cues or 

50


                                 ft. independently.)





Eating





Eating FIM Score                 5. Supervision/Set-Up (Needs help w/ 

containers,


                                 cutting meat, etc.)





Dressing-Upper body





Patient retrieves clothing       No


items:                           


Patient applies/removes UE       n/a


prosthesis or orthosis:          


Upper Body Dressing FIM Score    4. Minimal Assistance (Patient = 75% or more.


                                 Needs touching.)





Dressing-lower body





Patient retrieves clothing       No


items:                           


Patient applies/removes LE       n/a


prosthesis or orthosis:          


Lower Body Dressing FIM Score    4. Minimal Assistance (Patient = 75% or more.


                                 Needs touching.)











- Labs


CBC & Chem 7: 


                                 03/12/19 07:46





                                 03/12/19 07:46


Labs: 


                         Laboratory Results - last 72 hr











  03/10/19 03/11/19 03/12/19





  07:58 07:21 07:46


 


WBC    4.9


 


RBC    4.45


 


Hgb    12.8


 


Hct    38.8


 


MCV    87


 


MCH    29


 


MCHC    33


 


RDW    14.6


 


Plt Count    112 L


 


Sodium  141  141 


 


Potassium  3.9  4.0 


 


Chloride  99.7  103.5 


 


Carbon Dioxide  29  31 H 


 


Anion Gap  16  11 


 


BUN  29 H  21 H 


 


Creatinine  0.9  0.9 


 


Estimated GFR  > 60  > 60 


 


BUN/Creatinine Ratio  32  23 


 


Glucose  115 H  123 H 


 


Calcium  8.5  8.7 


 


Magnesium   














  03/12/19





  07:46


 


WBC 


 


RBC 


 


Hgb 


 


Hct 


 


MCV 


 


MCH 


 


MCHC 


 


RDW 


 


Plt Count 


 


Sodium  141


 


Potassium  3.8


 


Chloride  101.3


 


Carbon Dioxide  31 H


 


Anion Gap  13


 


BUN  15


 


Creatinine  0.8


 


Estimated GFR  > 60


 


BUN/Creatinine Ratio  19


 


Glucose  117 H


 


Calcium  8.9


 


Magnesium  2.20














Assessment and Plan


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 

prognosis and need to control HTN/DM/HLD. Will monitor for shoulder hand 

syndrome and post stroke depression. Former Tob use


I69.321 Dysphasia: SLP to work to improve ability to communicate with strategies

and repetition


I69.391 Dysphagia: on modified diet, advance as able. EStim, FEES, MBS as needed


I69.322 Dysarthria: SLP to work on improving motor control for speech 

improvement


I69.312 Visuospatial deficit after CVA: monitor right side, cue towards right, 

scanning strategies


I10 Hypertension: Cont atenolol, monitor BP and adjust as needed


I50.9 Heart Failure: BNP normal, lasix restarted at low dose


J44.9 COPD: On home O2, continue inhalers/nebs and adjust as needed, monitor for

worsening.  CXR in AM, was being treated with Abx prior to CVA and did not 

finish, wheezing now 


Z73.6 ADL dysfunction: OT will work on improving ability to perform ADLs 

(including assistive devices) to increase independence and decrease caregiver b

urden and improve functional transfers and mobility training.


R26.2 Difficulty walking: PT will work on gait training and proper use of 

assistive devices and advance as appropriate to use of stairs and outside 

ambulation on uneven surfaces.


R26.81 Unsteadiness on feet: PT will work on improving static and dynamic 

sitting and standing balance as well as proper use of assistive devices to 

decrease risk of falls.


R26.89 Abnormality of gait: PT will work to improve safety and efficiency of 

gait through neuromotor training and gait training along with instruction on 

proper use of assistive devices.


M62.81 Muscle weakness: PT & OT will work on strengthening exercises to improve 

functional strength including mixture of closed and open kinetic chain 

exercises.


R53.81 Debility: PT & OT will work on improving overall functional status to 

improve participation with ADLs, mobility and social involvement.


R53.83 Fatigue: PT & OT will work on improving endurance through aerobic 

exercises and therapeutic activity while monitoring patients tolerance for 

activity and vital signs as needed.


 





DVT ppx:Lovenox


Pain: Continue physical modalities in therapy and pain medications as needed to 

achieve functional pain control.  


Sleep: Monitor and address as needed. 


Bowel: Monitor and address as needed. Medications added for constipation 


Appetite: Monitor and address as needed.


Discharge planning: Pending therapy progress and care plan meeting. Will 

continue discussion with therapy team, SW, patient and family.





Restrictions/ Precautions:


Falls, Aspiration, right visual deficit





WB status: FWB





Functional Hx:


ADLs: Independent


Cognition: Independent


Mobility: No AD








Barriers to Discharge: Decreased mobility and ability to perform self care, 

balance deficits, weakness, right visual field deficit


Estimated Length of Stay: 14-21 days


Discharge Destination: Home with family

## 2019-03-13 LAB
BUN SERPL-MCNC: 12 MG/DL (ref 7–17)
BUN/CREAT SERPL: 15 %
CALCIUM SERPL-MCNC: 8.9 MG/DL (ref 8.4–10.2)
HEMOLYSIS INDEX: 6
PREALB SERPL-MCNC: 0.14 G/L (ref 0.2–0.4)

## 2019-03-13 RX ADMIN — ASPIRIN SCH MG: 325 TABLET ORAL at 08:19

## 2019-03-13 RX ADMIN — HYDROCODONE BITARTRATE AND ACETAMINOPHEN PRN EACH: 5; 325 TABLET ORAL at 20:06

## 2019-03-13 RX ADMIN — IPRATROPIUM BROMIDE AND ALBUTEROL SULFATE SCH: .5; 3 SOLUTION RESPIRATORY (INHALATION) at 20:47

## 2019-03-13 RX ADMIN — ENOXAPARIN SODIUM SCH MG: 100 INJECTION SUBCUTANEOUS at 08:20

## 2019-03-13 RX ADMIN — FUROSEMIDE SCH MG: 20 TABLET ORAL at 08:19

## 2019-03-13 RX ADMIN — ATENOLOL SCH MG: 25 TABLET ORAL at 08:19

## 2019-03-13 RX ADMIN — IPRATROPIUM BROMIDE AND ALBUTEROL SULFATE SCH: .5; 3 SOLUTION RESPIRATORY (INHALATION) at 03:00

## 2019-03-13 RX ADMIN — IPRATROPIUM BROMIDE AND ALBUTEROL SULFATE SCH AMPUL: .5; 3 SOLUTION RESPIRATORY (INHALATION) at 15:18

## 2019-03-13 RX ADMIN — IPRATROPIUM BROMIDE AND ALBUTEROL SULFATE SCH AMPUL: .5; 3 SOLUTION RESPIRATORY (INHALATION) at 09:08

## 2019-03-13 RX ADMIN — HYDROCODONE BITARTRATE AND ACETAMINOPHEN PRN EACH: 5; 325 TABLET ORAL at 14:46

## 2019-03-13 NOTE — CONSULTATION
History of Present Illness





- Reason for Consult


Consult date: 03/13/19


Medical management hypertension, COPD,CHF


Requesting physician: TIGRE CHIN III





- History of Present Illness


Patient is 69 yo with history of hypertension, CHF, COPD, 

dyslipidemia,obesity,recent acute ischemic stroke. She was admitted 2/28/19 and 

discharged to rehab 3/7/19 with main diagnosis of acute ischemic stroke. She is 

now [facility] has been [facility] for management of

comorbidities COPD, CHF, hypertension. currently no chest pain, no shortness of 

breath. she has dysarthria








Past History


Past Medical History: COPD, heart failure, hypertension, stroke


Past Surgical History: No surgical history


Social history: , lives with family, full code, other (former smoker, 

denies alcohol and illicit drug abuse)


Family history: cancer, hypertension





Medications and Allergies


                                    Allergies











Allergy/AdvReac Type Severity Reaction Status Date / Time


 


No Known Allergies Allergy   Verified 02/23/17 13:05











                                Home Medications











 Medication  Instructions  Recorded  Confirmed  Last Taken  Type


 


Furosemide [Lasix TAB] 40 mg PO DAILY 02/23/17 03/07/19 02/23/17 History


 


Potassium Chloride [K-Dur] 10 meq PO DAILY 02/23/17 03/07/19 02/23/17 History


 


Albuterol Sulfate [Ventolin HFA] 1 puff IH PRN PRN 02/28/19 03/07/19 Unknown 

History


 


Atenolol [Tenormin] 100 mg PO DAILY 02/28/19 03/07/19 Unknown History


 


Lisinopril [Zestril] 40 mg PO DAILY 02/28/19 03/07/19 Unknown History


 


Loratadine [Claritin] 10 mg PO DAILY 02/28/19 03/07/19 Unknown History


 


Omeprazole 40 mg PO DAILY 02/28/19 03/07/19 Unknown History


 


ALBUTEROL NEB's [Proventil 0.083% 2.5 mg IH Q4HRT PRN  nebu 03/07/19 03/07/19 

Unknown Rx





NEBS]     


 


Acetaminophen [Acetaminophen TAB] 650 mg PO Q6H PRN  tablet 03/07/19 03/07/19 

Unknown Rx


 


Aspirin [Aspirin TAB] 325 mg PO QDAY #30 tablet 03/07/19 03/07/19 Unknown Rx


 


AtorvaSTATin [Lipitor] 40 mg PO QHS #30 tablet 03/07/19 03/07/19 Unknown Rx


 


Bisacodyl [Dulcolax suppos] 10 mg NJ QDAY PRN  supp.rect 03/07/19 03/07/19 

Unknown Rx


 


HYDROcodone/APAP 5-325 [Norco 1 each PO Q4H PRN  tablet 03/07/19 03/07/19 

Unknown Rx





5-325 mg TAB]     


 


Ipratropium/Albuterol Sulfate 1 ampul IH Q6HRT  ampul.neb 03/07/19 03/07/19 

Unknown Rx





[DUONEB *Not for PRN Use*]     











Active Meds: 


Active Medications





Acetaminophen (Tylenol)  650 mg PO Q4H PRN


   PRN Reason: Pain MILD(1-3)/Fever >100.5/HA


Acetaminophen/Hydrocodone Bitart (Norco 5/325)  1 each PO Q4H PRN


   PRN Reason: Pain, Moderate (4-6)


   Last Admin: 03/12/19 18:39 Dose:  1 each


   Documented by: 


Albuterol (Proventil)  2.5 mg IH Q4HRT PRN


   PRN Reason: Shortness Of Breath


   Last Admin: 03/12/19 04:56 Dose:  2.5 mg


   Documented by: 


Albuterol/Ipratropium (Duoneb *Not For Prn Use*)  1 ampul IH TIDRT [facility]


   Last Admin: 03/13/19 09:08 Dose:  1 ampul


   Documented by: 


Aspirin (Aspirin)  325 mg PO QDAY [facility]


   Last Admin: 03/13/19 08:19 Dose:  325 mg


   Documented by: 


Atenolol (Tenormin)  25 mg PO QDAY [facility]


   Last Admin: 03/13/19 08:19 Dose:  25 mg


   Documented by: 


Atorvastatin Calcium (Lipitor)  40 mg PO QHS [facility]


   Last Admin: 03/12/19 21:56 Dose:  40 mg


   Documented by: 


Bisacodyl (Dulcolax)  10 mg NJ QDAY PRN


   PRN Reason: Constipation unrelieved by MOM


Enoxaparin Sodium (Lovenox)  40 mg SUB-Q QDAY [facility]


   Last Admin: 03/13/19 08:20 Dose:  40 mg


   Documented by: 


Furosemide (Lasix)  20 mg PO QDAY [facility]


   Last Admin: 03/13/19 08:19 Dose:  20 mg


   Documented by: 


Magnesium Hydroxide (Milk Of Magnesia)  30 ml PO Q4H PRN


   PRN Reason: Constipation


   Last Admin: 03/13/19 08:21 Dose:  30 ml


   Documented by: 











Review of Systems


All systems: negative (No chest pain, no fever, no cough. All other systems 

reviewed and are negative)





Exam





- Physical Exam


Narrative exam: 


GEN: Not in acute distress, lying in bed


HEENT: Normocephalic, atraumatic, 


Neck: supple, No JVD


Lungs: Clear to auscultation bilat, no crackles, no wheeze 


Abd:soft, non tender, non distended, normal bowel sounds


Ext: No edema, no clubbing, no cyanosis


Neuro:Awake,alert,oriented X 3, Dysarthria, moves all ext











- Constitutional


Vitals: 


                                        











Temp Pulse Resp BP Pulse Ox


 


 99 F   96 H  20   127/67   96 


 


 03/13/19 08:12  03/13/19 08:19  03/13/19 08:09  03/13/19 08:19  03/13/19 08:09














Results





- Labs


CBC & Chem 7: 


                                 03/14/19 06:50





                                 03/14/19 06:50


Labs: 


                              Abnormal lab results











  03/13/19 Range/Units





  07:43 


 


Glucose  117 H  ()  mg/dL


 


Prealbumin  0.137 L  (0.200-0.400)  g/L














Assessment and Plan


Acute ischemic stroke, recent.


Admitted to Rehab


Dr. Chin, attending, managing





Hypertension


continue Atenolol 25mg po daily, will adjust dose as per response


She was on Atenolol 100mg po daily





Hyperlipidemia


On statin





COPD


stable


Duoneb





Chronic CHF


Continiue lasix, atenolol





Full code status





Thanks for consulting us. Will follow

## 2019-03-14 LAB
BASOPHILS # (AUTO): 0 K/MM3 (ref 0–0.1)
BASOPHILS NFR BLD AUTO: 0.3 % (ref 0–1.8)
BUN SERPL-MCNC: 11 MG/DL (ref 7–17)
BUN/CREAT SERPL: 14 %
CALCIUM SERPL-MCNC: 8.8 MG/DL (ref 8.4–10.2)
EOSINOPHIL # BLD AUTO: 0.1 K/MM3 (ref 0–0.4)
EOSINOPHIL NFR BLD AUTO: 2.8 % (ref 0–4.3)
HCT VFR BLD CALC: 37 % (ref 30.3–42.9)
HEMOLYSIS INDEX: 3
HGB BLD-MCNC: 12.4 GM/DL (ref 10.1–14.3)
LYMPHOCYTES # BLD AUTO: 0.3 K/MM3 (ref 1.2–5.4)
LYMPHOCYTES NFR BLD AUTO: 6.6 % (ref 13.4–35)
MCHC RBC AUTO-ENTMCNC: 34 % (ref 30–34)
MCV RBC AUTO: 87 FL (ref 79–97)
MONOCYTES # (AUTO): 0.4 K/MM3 (ref 0–0.8)
MONOCYTES % (AUTO): 9.3 % (ref 0–7.3)
PLATELET # BLD: 113 K/MM3 (ref 140–440)
RBC # BLD AUTO: 4.23 M/MM3 (ref 3.65–5.03)

## 2019-03-14 RX ADMIN — ASPIRIN SCH MG: 325 TABLET ORAL at 11:21

## 2019-03-14 RX ADMIN — IPRATROPIUM BROMIDE AND ALBUTEROL SULFATE SCH AMPUL: .5; 3 SOLUTION RESPIRATORY (INHALATION) at 15:35

## 2019-03-14 RX ADMIN — FUROSEMIDE SCH MG: 20 TABLET ORAL at 11:18

## 2019-03-14 RX ADMIN — HYDROCODONE BITARTRATE AND ACETAMINOPHEN PRN EACH: 5; 325 TABLET ORAL at 13:19

## 2019-03-14 RX ADMIN — IPRATROPIUM BROMIDE AND ALBUTEROL SULFATE SCH AMPUL: .5; 3 SOLUTION RESPIRATORY (INHALATION) at 04:44

## 2019-03-14 RX ADMIN — HYDROCODONE BITARTRATE AND ACETAMINOPHEN PRN EACH: 5; 325 TABLET ORAL at 22:06

## 2019-03-14 RX ADMIN — IPRATROPIUM BROMIDE AND ALBUTEROL SULFATE SCH AMPUL: .5; 3 SOLUTION RESPIRATORY (INHALATION) at 09:25

## 2019-03-14 RX ADMIN — ENOXAPARIN SODIUM SCH MG: 100 INJECTION SUBCUTANEOUS at 11:20

## 2019-03-14 RX ADMIN — IPRATROPIUM BROMIDE AND ALBUTEROL SULFATE SCH: .5; 3 SOLUTION RESPIRATORY (INHALATION) at 02:21

## 2019-03-14 RX ADMIN — ATENOLOL SCH MG: 25 TABLET ORAL at 11:20

## 2019-03-14 RX ADMIN — IPRATROPIUM BROMIDE AND ALBUTEROL SULFATE SCH AMPUL: .5; 3 SOLUTION RESPIRATORY (INHALATION) at 19:19

## 2019-03-14 NOTE — PROGRESS NOTE
Subjective


Date of service: 03/14/19


Principal diagnosis: Left CVA


Interval history: 


69 YO Female with Obesity, HTN, COPD, CHF presented to ED for evaluation for 

right sided weakness and decreased responsiveness. Patient unable to provide 

history due to condition, per the son the patient experienced an acute onset of 

right arm and leg weakness, and became unresponsive while at a routine appoin

tment for her  at his pulmonologist's office. EMS was notified and upon 

arrival the patient was found to have neurologic deficit resulting in a code 

stroke, found to have Acute CVA and was treated with TPA in ED.  She was 

transferred to the ICU afterwards for monitoring.  MRI brain showed acute left 

basal ganglia/left periventricular white matter infarct and a small acute 

infarct in the left cerebellum. Prior to transfer she developed abdominal pain 

and was evaluated and found to have gallstones.  After she was medically cleared

she was transferred for further rehab.





Patient is participating in therapy and making reasonable progress. Taking rest 

breaks as needed.   +BM.  Denies pain, palpitations, dyspnea, cough, N/V,  or 

joint pain.  Admits to intermittent colicky RUQ pain but not currently.   BP and

tachycardia better since starting back on antihypertensive, IM increased dose, 

appreciate assistance.  Plts still low - normal prior to transfer to rehab.  

Walking with therapy, on stairs. RUE shoulder abduction and finger abduction 

improved.  Speech about the same.  Discussed dischareg with patient and therapy,

barring any setbacks, will plan to discharge home with  therapy. Discussed 

need for patient to continue secondary stroke prevention, utilize AD for 

mobility and continue dysphagia diet until she is able to safely advance. Also 

discussed need to follow up with PCP for BP monitoring and medication 

adjustment.





All records, vitals, labs and medications were reviewed.  No other issues per 

patient, nursing or therapy.





Objective





- Exam


Narrative Exam: 


MUSCULOSKELETAL SPECIALTY EXAM





CONSTITUTIONAL:


Well developed, well nourished, appropriately groomed obese. 


RIGHT hand dominant.





RESPIRATORY:


Bilateral wheeze to ascultation, no increased work of breathing, on 2L O2 at 

home 24/7 





CARDIOVASCULAR:  


Regular Rhythm,tachycardic, no swelling, edema or tenderness in BUE or BLE. All 

extremities warm.  





GI: 


+ bowel sounds, soft, NTTP, nondistended.





INTEGUMENTARY:


Normal, no lesion, rash, masses or bruising noted in extremities. 





MUSCULOSKELETAL:


BUE and BLE normal without defect, crepitus, subluxation, effusion, arthritic 

changes or TTP. 





         SA       EF      WE      EE       FF      FA      HF      KE      ADF  

   EHL      APF


R       4/5      4/5     4/5      4/5     4/5     4/5     4/5     4/5     4/5   

   4/5      4/5


L        5/5      5/5     5/5     5/5      5/5     5 /5    5/5     5/5      5/5 

     5/5      5/5





ROM decreased on right but improving, normal on left


Tone normal





NEURO:


CN II - XII grossly intact except: visual field decreased on right, right facial

droop, decreased shoulder shrug and head rotation on right





Sensation intact in all extremities without extinction. 


No tremor noted in 4 extremities.  


Naming and repetition impaired. Right inattention 


Follows 1 step commands.


Dysphasia and Dysarthria present


Dysphagia 








POSTURE and GAIT:


Sitting posture good. Balance appears reasonable.  Gait with RW fairly steady, 

some LOB and right inattention.





PSYCH:


Alert, oriented x3, affect appears flat. Insight appears intact.











- Constitutional


Vitals: 


                               Vital Signs - 12hr











  03/14/19 03/14/19 03/14/19





  04:33 04:45 07:36


 


Temperature 36.3 C L  36.7 C


 


Pulse Rate 90  


 


Pulse Rate [  100 H 





Bilateral   





Throughout]   


 


Respiratory 20  16





Rate   


 


Respiratory  20 





Rate [Bilateral   





Throughout]   


 


Blood Pressure 134/74  142/72


 


Blood Pressure   





[Right]   


 


O2 Sat by Pulse 97  





Oximetry   














  03/14/19 03/14/19 03/14/19





  07:38 09:23 09:37


 


Temperature 36.6 C  


 


Pulse Rate 95 H  


 


Pulse Rate [  95 H 92 H





Bilateral   





Throughout]   


 


Respiratory 16  





Rate   


 


Respiratory  20 16





Rate [Bilateral   





Throughout]   


 


Blood Pressure   


 


Blood Pressure 142/72  





[Right]   


 


O2 Sat by Pulse   





Oximetry   














  03/14/19 03/14/19 03/14/19





  09:43 12:00 12:05


 


Temperature  36.4 C 36.4 C


 


Pulse Rate  105 H 


 


Pulse Rate [   





Bilateral   





Throughout]   


 


Respiratory  22 22





Rate   


 


Respiratory   





Rate [Bilateral   





Throughout]   


 


Blood Pressure   149/78


 


Blood Pressure  149/78 





[Right]   


 


O2 Sat by Pulse 99 93 





Oximetry   














  03/14/19





  15:31


 


Temperature 


 


Pulse Rate 


 


Pulse Rate [ 98 H





Bilateral 





Throughout] 


 


Respiratory 





Rate 


 


Respiratory 18





Rate [Bilateral 





Throughout] 


 


Blood Pressure 


 


Blood Pressure 





[Right] 


 


O2 Sat by Pulse 





Oximetry 














- Allied health notes


Allied health notes reviewed: nursing, PT, ST, OT


FIMS assessment as documented by PT/OT/ST: 


Grooming





Patient cleans teeth/dentures:   No


Patient chapman/brushes hair:      Yes


Patient washes, rinses and       Yes


dries face:                      


Patient washes, rinses and       Yes


dries hands:                     


Patient shaves:                  No


Patient applies make-up:         No


Patient performs (no make-up/    3/4 (75%)


shaving):                        


Grooming FIM Score               4. Minimal Assistance (Patient = 75% or more.


                                 Needs touching.)





Toileting





Toileting Device                 Bedside Commode,Commode over Toilet


Patient able to:                 Adjust clothes before,Clean self,Adjust clothes


                                 after


Patient able to perform:         2/3 (67%)


Toileting FIM Score              4. Minimal Assistance (Patient = 75% or more.


                                 Needs touching.)





Social interaction/Memory/Problem solving





Social Interaction FIM Score     6. Mod. Beckham (Mostly appropriate. May


                                 need meds. No supv.)


Memory FIM Score                 3. Moderate Assistance (Recognizes and 

remembers


                                 50-74%.)


Problem Solving FIM Score        3. Moderate Assistance (Solves routine problems


                                 50-74%.)





Transfers





Mode of Locomotion:              Wheelchair


Bed/Chair/Wheelchair Transfers   5. Supervision (Needs supv. or set-up for


FIM Score                        sliding board, foot rests.)


Toilet Transfers FIM Score       3. Moderate Assistance (Patient = 50% or more.


                                 Some lifting.)


Patient transferred to:          Shower


Shower Transfers FIM Score       4. Minimal Assistance (Patient = 75% or more.


                                 Needs touching.)





Locomotion- Stairs





Device used on Stairs            Handrail/s


Number of Stairs Ascended/       12


Descended                        


Patient used handrail/support:   Yes


Stairs FIM Score                 4. Minimal Assistance (Patient = 75% or more,


                                 touching. 12-14 stairs.)





Locomotion- walk/wheelchair





Most Frequent Mode of            Walking


Locomotion:                      


Ambulation Distance              170


Walking FIM Score                4. Minimal Assistance (Patient = 75% or more.


                                 Minimum of 150 ft.)


Wheelchair Propulsion Distance   300


Wheelchair FIM Score             5. Supervision (Minimum 150 ft. supv./cues or 

50


                                 ft. independently.)





Eating





Eating FIM Score                 5. Supervision/Set-Up (Needs help w/ 

containers,


                                 cutting meat, etc.)





Dressing-Upper body





Patient retrieves clothing       No


items:                           


Patient applies/removes UE       n/a


prosthesis or orthosis:          


Upper Body Dressing FIM Score    4. Minimal Assistance (Patient = 75% or more.


                                 Needs touching.)





Dressing-lower body





Patient retrieves clothing       No


items:                           


Patient applies/removes LE       n/a


prosthesis or orthosis:          


Lower Body Dressing FIM Score    4. Minimal Assistance (Patient = 75% or more.


                                 Needs touching.)











- Labs


CBC & Chem 7: 


                                 03/14/19 06:50





                                 03/14/19 06:50


Labs: 


                         Laboratory Results - last 72 hr











  03/12/19 03/12/19 03/13/19





  07:46 07:46 07:43


 


WBC  4.9  


 


RBC  4.45  


 


Hgb  12.8  


 


Hct  38.8  


 


MCV  87  


 


MCH  29  


 


MCHC  33  


 


RDW  14.6  


 


Plt Count  112 L  


 


Lymph % (Auto)   


 


Mono % (Auto)   


 


Eos % (Auto)   


 


Baso % (Auto)   


 


Lymph #   


 


Mono #   


 


Eos #   


 


Baso #   


 


Seg Neutrophils %   


 


Seg Neutrophils #   


 


Sodium   141  140


 


Potassium   3.8  3.9


 


Chloride   101.3  100.4


 


Carbon Dioxide   31 H  29


 


Anion Gap   13  15


 


BUN   15  12


 


Creatinine   0.8  0.8


 


Estimated GFR   > 60  > 60


 


BUN/Creatinine Ratio   19  15


 


Glucose   117 H  117 H


 


Calcium   8.9  8.9


 


Magnesium   2.20 


 


Prealbumin    0.137 L














  03/14/19 03/14/19





  06:50 06:50


 


WBC   3.9 L


 


RBC   4.23


 


Hgb   12.4


 


Hct   37.0


 


MCV   87


 


MCH   29


 


MCHC   34


 


RDW   14.9


 


Plt Count   113 L


 


Lymph % (Auto)   6.6 L


 


Mono % (Auto)   9.3 H


 


Eos % (Auto)   2.8


 


Baso % (Auto)   0.3


 


Lymph #   0.3 L


 


Mono #   0.4


 


Eos #   0.1


 


Baso #   0.0


 


Seg Neutrophils %   81.0 H


 


Seg Neutrophils #   3.2


 


Sodium  144 


 


Potassium  4.0 


 


Chloride  101.5 


 


Carbon Dioxide  31 H 


 


Anion Gap  16 


 


BUN  11 


 


Creatinine  0.8 


 


Estimated GFR  > 60 


 


BUN/Creatinine Ratio  14 


 


Glucose  115 H 


 


Calcium  8.8 


 


Magnesium  


 


Prealbumin  














Assessment and Plan


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 

prognosis and need to control HTN/DM/HLD. Will monitor for shoulder hand 

syndrome and post stroke depression. Former Tob use


I69.321 Dysphasia: SLP to work to improve ability to communicate with strategies

 and repetition


I69.391 Dysphagia: on modified diet, advance as able. EStim, FEES, MBS as needed


I69.322 Dysarthria: SLP to work on improving motor control for speech 

improvement


I69.312 Visuospatial deficit after CVA: monitor right side, cue towards right, 

scanning strategies


I10 Hypertension: Cont atenolol, monitor BP and adjust as needed


I50.9 Heart Failure: BNP normal, lasix restarted at low dose


J44.9 COPD: On home O2, continue inhalers/nebs and adjust as needed, monitor for

 worsening.  CXR in AM, was being treated with Abx prior to CVA and did not 

finish, wheezing now 


Z73.6 ADL dysfunction: OT will work on improving ability to perform ADLs 

(including assistive devices) to increase independence and decrease caregiver 

burden and improve functional transfers and mobility training.


R26.2 Difficulty walking: PT will work on gait training and proper use of 

assistive devices and advance as appropriate to use of stairs and outside 

ambulation on uneven surfaces.


R26.81 Unsteadiness on feet: PT will work on improving static and dynamic si

tting and standing balance as well as proper use of assistive devices to 

decrease risk of falls.


R26.89 Abnormality of gait: PT will work to improve safety and efficiency of 

gait through neuromotor training and gait training along with instruction on 

proper use of assistive devices.


M62.81 Muscle weakness: PT & OT will work on strengthening exercises to improve 

functional strength including mixture of closed and open kinetic chain 

exercises.


R53.81 Debility: PT & OT will work on improving overall functional status to 

improve participation with ADLs, mobility and social involvement.


R53.83 Fatigue: PT & OT will work on improving endurance through aerobic 

exercises and therapeutic activity while monitoring patients tolerance for 

activity and vital signs as needed.


 





DVT ppx:Lovenox


Pain: Continue physical modalities in therapy and pain medications as needed to 

achieve functional pain control.  


Sleep: Monitor and address as needed. 


Bowel: Monitor and address as needed. Medications added for constipation 


Appetite: Monitor and address as needed.


Discharge planning: Pending therapy progress and care plan meeting. Will 

continue discussion with therapy team, SW, patient and family.  D/C Saturday 

planned, patient is reaching a level of improvement with PT/OT that is 

appropriate to continue at a  level of therapy.  Will need prolonged SLP 

services for continued recovery.  Safety issues have been discussed with 

patient.  





Restrictions/ Precautions:


Falls, Aspiration, right visual deficit





WB status: FWB





Functional Hx:


ADLs: Independent


Cognition: Independent


Mobility: No AD





Consults


IM for management of HTN, CHF, COPD, Electrolyte abnormalities and 

thrombocytopenia - appreciate their assistance. 





Barriers to Discharge: Decreased mobility and ability to perform self care, 

balance deficits, weakness, right visual field deficit


Estimated Length of Stay: 14-21 days


Discharge Destination: Home with family

## 2019-03-15 RX ADMIN — ENOXAPARIN SODIUM SCH MG: 100 INJECTION SUBCUTANEOUS at 07:57

## 2019-03-15 RX ADMIN — FUROSEMIDE SCH MG: 20 TABLET ORAL at 07:56

## 2019-03-15 RX ADMIN — IPRATROPIUM BROMIDE AND ALBUTEROL SULFATE SCH AMPUL: .5; 3 SOLUTION RESPIRATORY (INHALATION) at 19:11

## 2019-03-15 RX ADMIN — IPRATROPIUM BROMIDE AND ALBUTEROL SULFATE SCH: .5; 3 SOLUTION RESPIRATORY (INHALATION) at 02:30

## 2019-03-15 RX ADMIN — IPRATROPIUM BROMIDE AND ALBUTEROL SULFATE SCH AMPUL: .5; 3 SOLUTION RESPIRATORY (INHALATION) at 06:17

## 2019-03-15 RX ADMIN — IPRATROPIUM BROMIDE AND ALBUTEROL SULFATE SCH AMPUL: .5; 3 SOLUTION RESPIRATORY (INHALATION) at 15:21

## 2019-03-15 RX ADMIN — ASPIRIN SCH MG: 325 TABLET ORAL at 07:56

## 2019-03-15 RX ADMIN — ATENOLOL SCH MG: 50 TABLET ORAL at 07:56

## 2019-03-15 RX ADMIN — IPRATROPIUM BROMIDE AND ALBUTEROL SULFATE SCH AMPUL: .5; 3 SOLUTION RESPIRATORY (INHALATION) at 09:09

## 2019-03-15 RX ADMIN — ALBUTEROL SULFATE PRN MG: 2.5 SOLUTION RESPIRATORY (INHALATION) at 06:10

## 2019-03-15 RX ADMIN — HYDROCODONE BITARTRATE AND ACETAMINOPHEN PRN EACH: 5; 325 TABLET ORAL at 22:08

## 2019-03-15 NOTE — PROGRESS NOTE
Assessment and Plan


Assessment and plan: 


Acute ischemic stroke, recent.


Admitted to Rehab


Dr. Healy, attending, managing





Hypertension


BP increasing. Will increase atenolol to 100mg po daily





Hyperlipidemia


On statin





COPD


stable


Duoneb





Chronic CHF


Continiue Lasix, Atenolol





Thrombocytopenia.


Will monitor





Full code status





Thanks for consulting us. Will follow








History


Interval history: 


Feels better


Still dysartria








Hospitalist Physical





- Physical exam


Narrative exam: 


GEN: Not in acute distress, lying in bed


HEENT: Normocephalic, atraumatic, 


Neck: supple, No JVD


Lungs: Clear to auscultation bilat, no crackles, no wheeze 


Abd:soft, non tender, non distended, normal bowel sounds


Ext: No edema, no clubbing, no cyanosis


Neuro:Awake,alert,oriented X 3,right sided weakness, Dysarthria,











- Constitutional


Vitals: 


                                        











Temp Pulse Resp BP Pulse Ox


 


 97.8 F   85   17   138/69   96 


 


 03/14/19 22:12  03/14/19 22:12  03/14/19 22:19  03/14/19 22:12  03/14/19 22:19














Results





- Labs


CBC & Chem 7: 


                                 03/14/19 06:50





                                 03/14/19 06:50


Labs: 


                             Laboratory Last Values











WBC  3.9 K/mm3 (4.5-11.0)  L  03/14/19  06:50    


 


RBC  4.23 M/mm3 (3.65-5.03)   03/14/19  06:50    


 


Hgb  12.4 gm/dl (10.1-14.3)   03/14/19  06:50    


 


Hct  37.0 % (30.3-42.9)   03/14/19  06:50    


 


MCV  87 fl (79-97)   03/14/19  06:50    


 


MCH  29 pg (28-32)   03/14/19  06:50    


 


MCHC  34 % (30-34)   03/14/19  06:50    


 


RDW  14.9 % (13.2-15.2)   03/14/19  06:50    


 


Plt Count  113 K/mm3 (140-440)  L  03/14/19  06:50    


 


Lymph % (Auto)  6.6 % (13.4-35.0)  L  03/14/19  06:50    


 


Mono % (Auto)  9.3 % (0.0-7.3)  H  03/14/19  06:50    


 


Eos % (Auto)  2.8 % (0.0-4.3)   03/14/19  06:50    


 


Baso % (Auto)  0.3 % (0.0-1.8)   03/14/19  06:50    


 


Lymph #  0.3 K/mm3 (1.2-5.4)  L  03/14/19  06:50    


 


Mono #  0.4 K/mm3 (0.0-0.8)   03/14/19  06:50    


 


Eos #  0.1 K/mm3 (0.0-0.4)   03/14/19  06:50    


 


Baso #  0.0 K/mm3 (0.0-0.1)   03/14/19  06:50    


 


Seg Neutrophils %  81.0 % (40.0-70.0)  H  03/14/19  06:50    


 


Seg Neutrophils #  3.2 K/mm3 (1.8-7.7)   03/14/19  06:50    


 


Sodium  144 mmol/L (137-145)   03/14/19  06:50    


 


Potassium  4.0 mmol/L (3.6-5.0)   03/14/19  06:50    


 


Chloride  101.5 mmol/L ()   03/14/19  06:50    


 


Carbon Dioxide  31 mmol/L (22-30)  H  03/14/19  06:50    


 


Anion Gap  16 mmol/L  03/14/19  06:50    


 


BUN  11 mg/dL (7-17)   03/14/19  06:50    


 


Creatinine  0.8 mg/dL (0.7-1.2)   03/14/19  06:50    


 


Estimated GFR  > 60 ml/min  03/14/19  06:50    


 


BUN/Creatinine Ratio  14 %  03/14/19  06:50    


 


Glucose  115 mg/dL ()  H  03/14/19  06:50    


 


Calcium  8.8 mg/dL (8.4-10.2)   03/14/19  06:50    


 


Magnesium  2.20 mg/dL (1.7-2.3)   03/12/19  07:46    


 


Total Bilirubin  1.10 mg/dL (0.1-1.2)   03/08/19  04:35    


 


AST  19 units/L (5-40)   03/08/19  04:35    


 


ALT  26 units/L (7-56)   03/08/19  04:35    


 


Alkaline Phosphatase  96 units/L ()   03/08/19  04:35    


 


NT-Pro-B Natriuret Pep  70.34 pg/mL (0-900)   03/08/19  04:35    


 


Total Protein  6.0 g/dL (6.3-8.2)  L  03/08/19  04:35    


 


Albumin  3.6 g/dL (3.9-5)  L  03/08/19  04:35    


 


Albumin/Globulin Ratio  1.5 %  03/08/19  04:35    


 


Prealbumin  0.137 g/L (0.200-0.400)  L  03/13/19  07:43    














Nutrition/Malnutrition Assess





- Dietary Evaluation


Nutrition/Malnutrition Findings: 


                                        





Nutrition Notes                                            Start:  03/13/19 

15:30


Freq:                                                      Status: Active       




Protocol:                                                                       




 Document     03/14/19 12:48  TW  (Rec: 03/14/19 13:20  TW  [id number])


 Co-Sign      03/14/19 12:48  LP


 Nutrition Notes


     Initial or Follow up                        Reassessment


     Current Diagnosis                           COPD,Hypertension,Heart


                                                 Failure,Stroke


     Other Pertinent Diagnosis                   DVT


     Current Diet                                pureed


     Labs/Tests                                  Reviewed.


     Pertinent Medications                       Reviewed.


     Height                                      5 ft 2 in


     Weight                                      85.7 kg


     Ideal Body Weight (kg)                      50.00


     BMI                                         34.5


     Weight Status                               Overweight


     Subjective/Other Information                Pt stated she is not eating


                                                 any of her meals. RN stated


                                                 she will discuss diet


                                                 advancement with MD. Pt stated


                                                 she is not drinking any of


                                                 her supplement.


     Percent of energy/protein needs met:        0%/0%


     Burn                                        Absent


     Trauma                                      Absent


     #1


      Nutrition Diagnosis                        Inadequate oral intake


      Etiology                                   dislike of pureed food


      As Evidenced by Signs and Symptoms         Meeting 0% of her calorie and


                                                 protein needs


     Is patient on ventilator?                   No


     Is Patient Ambulatory and/or Out of Bed     Yes


     REE-([facility]-ambulatory/OOB) [     1755.325


      [id number]]                               


     Calculation [facility] for Recommendations        [facility]


     Additional Notes                            Protein needs: (1.0-1.2 g/kg)


                                                 ( g/day)


                                                 Fluid needs: 1ml/kcal


 Nutrition Intervention


     Change Diet Order:                          Advance when medically


                                                 feasible


     Goal #1                                     Diet advancement


     Goal #2                                     Meet at least 75% of calorie


                                                 and protein needs by PO intake


     Follow-Up By:                               03/19/19


     Additional Comments                         f/u: intakes and diet


                                                 advancement

## 2019-03-15 NOTE — PROGRESS NOTE
Assessment and Plan


Assessment and plan: 


Acute ischemic stroke, recent.


Admitted to Rehab


Dr. Healy, attending, managing





Hypertension


BP increasing. Will increase atenolol to 100mg po daily





Hyperlipidemia


On statin





COPD


Had more shortness of breath on exertion earlier but now resolved after 

breathing treatment.


cont Duoneb Q6


cont Albuterol q 4 prn





Chronic CHF


Continiue Lasix, Atenolol





Thrombocytopenia.


Will monitor





Full code status





Thanks for consulting us. Will follow








History


Interval history: 


Feels better


Still dysarthria


SOB earlier, now resolved after breathing treatment








Hospitalist Physical





- Physical exam


Narrative exam: 


GEN: Not in acute distress, lying in bed


HEENT: Normocephalic, atraumatic, 


Neck: supple, No JVD


Lungs: Clear to auscultation bilat, no crackles, no wheeze 


Abd:soft, non tender, non distended, normal bowel sounds


Ext: No edema, no clubbing, no cyanosis


Neuro:Awake,alert,oriented X 3,right sided weakness, Dysarthria,











- Constitutional


Vitals: 


                                        











Temp Pulse Resp BP Pulse Ox


 


 97.4 F L  90   20   120/70   100 


 


 03/15/19 17:49  03/15/19 17:49  03/15/19 17:49  03/15/19 17:49  03/15/19 17:49














Results





- Labs


CBC & Chem 7: 


                                 03/14/19 06:50





                                 03/14/19 06:50


Labs: 


                             Laboratory Last Values











WBC  3.9 K/mm3 (4.5-11.0)  L  03/14/19  06:50    


 


RBC  4.23 M/mm3 (3.65-5.03)   03/14/19  06:50    


 


Hgb  12.4 gm/dl (10.1-14.3)   03/14/19  06:50    


 


Hct  37.0 % (30.3-42.9)   03/14/19  06:50    


 


MCV  87 fl (79-97)   03/14/19  06:50    


 


MCH  29 pg (28-32)   03/14/19  06:50    


 


MCHC  34 % (30-34)   03/14/19  06:50    


 


RDW  14.9 % (13.2-15.2)   03/14/19  06:50    


 


Plt Count  113 K/mm3 (140-440)  L  03/14/19  06:50    


 


Lymph % (Auto)  6.6 % (13.4-35.0)  L  03/14/19  06:50    


 


Mono % (Auto)  9.3 % (0.0-7.3)  H  03/14/19  06:50    


 


Eos % (Auto)  2.8 % (0.0-4.3)   03/14/19  06:50    


 


Baso % (Auto)  0.3 % (0.0-1.8)   03/14/19  06:50    


 


Lymph #  0.3 K/mm3 (1.2-5.4)  L  03/14/19  06:50    


 


Mono #  0.4 K/mm3 (0.0-0.8)   03/14/19  06:50    


 


Eos #  0.1 K/mm3 (0.0-0.4)   03/14/19  06:50    


 


Baso #  0.0 K/mm3 (0.0-0.1)   03/14/19  06:50    


 


Seg Neutrophils %  81.0 % (40.0-70.0)  H  03/14/19  06:50    


 


Seg Neutrophils #  3.2 K/mm3 (1.8-7.7)   03/14/19  06:50    


 


Sodium  144 mmol/L (137-145)   03/14/19  06:50    


 


Potassium  4.0 mmol/L (3.6-5.0)   03/14/19  06:50    


 


Chloride  101.5 mmol/L ()   03/14/19  06:50    


 


Carbon Dioxide  31 mmol/L (22-30)  H  03/14/19  06:50    


 


Anion Gap  16 mmol/L  03/14/19  06:50    


 


BUN  11 mg/dL (7-17)   03/14/19  06:50    


 


Creatinine  0.8 mg/dL (0.7-1.2)   03/14/19  06:50    


 


Estimated GFR  > 60 ml/min  03/14/19  06:50    


 


BUN/Creatinine Ratio  14 %  03/14/19  06:50    


 


Glucose  115 mg/dL ()  H  03/14/19  06:50    


 


Calcium  8.8 mg/dL (8.4-10.2)   03/14/19  06:50    


 


Magnesium  2.20 mg/dL (1.7-2.3)   03/12/19  07:46    


 


Total Bilirubin  1.10 mg/dL (0.1-1.2)   03/08/19  04:35    


 


AST  19 units/L (5-40)   03/08/19  04:35    


 


ALT  26 units/L (7-56)   03/08/19  04:35    


 


Alkaline Phosphatase  96 units/L ()   03/08/19  04:35    


 


NT-Pro-B Natriuret Pep  70.34 pg/mL (0-900)   03/08/19  04:35    


 


Total Protein  6.0 g/dL (6.3-8.2)  L  03/08/19  04:35    


 


Albumin  3.6 g/dL (3.9-5)  L  03/08/19  04:35    


 


Albumin/Globulin Ratio  1.5 %  03/08/19  04:35    


 


Prealbumin  0.137 g/L (0.200-0.400)  L  03/13/19  07:43    














Nutrition/Malnutrition Assess





- Dietary Evaluation


Nutrition/Malnutrition Findings: 


                                        





Nutrition Notes                                            Start:  03/13/19 

15:30


Freq:                                                      Status: Active       




Protocol:                                                                       




 Document     03/14/19 12:48  TW  (Rec: 03/14/19 13:20  TW  [email or web address])


 Co-Sign      03/14/19 12:48  LP


 Nutrition Notes


     Initial or Follow up                        Reassessment


     Current Diagnosis                           COPD,Hypertension,Heart


                                                 Failure,Stroke


     Other Pertinent Diagnosis                   DVT


     Current Diet                                pureed


     Labs/Tests                                  Reviewed.


     Pertinent Medications                       Reviewed.


     Height                                      5 ft 2 in


     Weight                                      85.7 kg


     Ideal Body Weight (kg)                      50.00


     BMI                                         34.5


     Weight Status                               Overweight


     Subjective/Other Information                Pt stated she is not eating


                                                 any of her meals. RN stated


                                                 she will discuss diet


                                                 advancement with MD. Pt stated


                                                 she is not drinking any of


                                                 her supplement.


     Percent of energy/protein needs met:        0%/0%


     Burn                                        Absent


     Trauma                                      Absent


     #1


      Nutrition Diagnosis                        Inadequate oral intake


      Etiology                                   dislike of pureed food


      As Evidenced by Signs and Symptoms         Meeting 0% of her calorie and


                                                 protein needs


     Is patient on ventilator?                   No


     Is Patient Ambulatory and/or Out of Bed     Yes


     [facility] [     1755.325


      [email or web address]]                               


     [place] Used for Recommendations        [facility]


     Additional Notes                            Protein needs: (1.0-1.2 g/kg)


                                                 ( g/day)


                                                 Fluid needs: 1ml/kcal


 Nutrition Intervention


     Change Diet Order:                          Advance when medically


                                                 feasible


     Goal #1                                     Diet advancement


     Goal #2                                     Meet at least 75% of calorie


                                                 and protein needs by PO intake


     Follow-Up By:                               03/19/19


     Additional Comments                         f/u: intakes and diet


                                                 advancement

## 2019-03-15 NOTE — EVENT NOTE
Date: 03/15/19





Notified this AM that patient is having increased difficulty with ambulation and

balance.  Therapy is advising to continue until she stabilizes and I agree 

especially in light of her living with her  and family living 30 mins 

away.  Will continue rehab until she is more stable and attempt discharge at 

later date.  Patient is not happy with the change but understands.

## 2019-03-16 RX ADMIN — IPRATROPIUM BROMIDE AND ALBUTEROL SULFATE SCH AMPUL: .5; 3 SOLUTION RESPIRATORY (INHALATION) at 07:58

## 2019-03-16 RX ADMIN — FUROSEMIDE SCH MG: 20 TABLET ORAL at 09:37

## 2019-03-16 RX ADMIN — ATENOLOL SCH MG: 50 TABLET ORAL at 09:37

## 2019-03-16 RX ADMIN — IPRATROPIUM BROMIDE AND ALBUTEROL SULFATE SCH AMPUL: .5; 3 SOLUTION RESPIRATORY (INHALATION) at 13:20

## 2019-03-16 RX ADMIN — IPRATROPIUM BROMIDE AND ALBUTEROL SULFATE SCH AMPUL: .5; 3 SOLUTION RESPIRATORY (INHALATION) at 19:46

## 2019-03-16 RX ADMIN — ASPIRIN SCH MG: 325 TABLET ORAL at 09:37

## 2019-03-16 RX ADMIN — ENOXAPARIN SODIUM SCH MG: 100 INJECTION SUBCUTANEOUS at 09:37

## 2019-03-16 RX ADMIN — IPRATROPIUM BROMIDE AND ALBUTEROL SULFATE SCH AMPUL: .5; 3 SOLUTION RESPIRATORY (INHALATION) at 01:21

## 2019-03-16 NOTE — PROGRESS NOTE
Assessment and Plan


Assessment and plan: 


Acute ischemic stroke, recent.


Admitted to Rehab


Dr. Healy, attending, managing





Hypertension


BP increasing. Will increase atenolol to 100mg po daily





Hyperlipidemia


On statin





COPD exacerbation


Start solu-medrol


cont Duoneb Q6


cont Albuterol q 4 prn





Chronic CHF


Continiue Lasix, Atenolol





Thrombocytopenia.


Will monitor





Full code status





Thanks for consulting us. Will continue to follow








History


Interval history: 


Shortness of breath


Still dysarthria











Hospitalist Physical





- Physical exam


Narrative exam: 


GEN: Not in acute distress, lying in bed


HEENT: Normocephalic, atraumatic, 


Neck: supple, No JVD


Lungs: Bilateral rhonchi, no crackles, no wheeze 


Abd:soft, non tender, non distended, normal bowel sounds


Ext: No edema, no clubbing, no cyanosis


Neuro:Awake,alert,oriented X 3,right sided weakness, Dysarthria,











- Constitutional


Vitals: 


                                        











Temp Pulse Resp BP Pulse Ox


 


 97.9 F   84   22   142/77   95 


 


 03/16/19 15:52  03/16/19 15:52  03/16/19 15:52  03/16/19 15:52  03/16/19 15:52














Results





- Labs


CBC & Chem 7: 


                                 03/14/19 06:50





                                 03/14/19 06:50


Labs: 


                             Laboratory Last Values











WBC  3.9 K/mm3 (4.5-11.0)  L  03/14/19  06:50    


 


RBC  4.23 M/mm3 (3.65-5.03)   03/14/19  06:50    


 


Hgb  12.4 gm/dl (10.1-14.3)   03/14/19  06:50    


 


Hct  37.0 % (30.3-42.9)   03/14/19  06:50    


 


MCV  87 fl (79-97)   03/14/19  06:50    


 


MCH  29 pg (28-32)   03/14/19  06:50    


 


MCHC  34 % (30-34)   03/14/19  06:50    


 


RDW  14.9 % (13.2-15.2)   03/14/19  06:50    


 


Plt Count  113 K/mm3 (140-440)  L  03/14/19  06:50    


 


Lymph % (Auto)  6.6 % (13.4-35.0)  L  03/14/19  06:50    


 


Mono % (Auto)  9.3 % (0.0-7.3)  H  03/14/19  06:50    


 


Eos % (Auto)  2.8 % (0.0-4.3)   03/14/19  06:50    


 


Baso % (Auto)  0.3 % (0.0-1.8)   03/14/19  06:50    


 


Lymph #  0.3 K/mm3 (1.2-5.4)  L  03/14/19  06:50    


 


Mono #  0.4 K/mm3 (0.0-0.8)   03/14/19  06:50    


 


Eos #  0.1 K/mm3 (0.0-0.4)   03/14/19  06:50    


 


Baso #  0.0 K/mm3 (0.0-0.1)   03/14/19  06:50    


 


Seg Neutrophils %  81.0 % (40.0-70.0)  H  03/14/19  06:50    


 


Seg Neutrophils #  3.2 K/mm3 (1.8-7.7)   03/14/19  06:50    


 


Sodium  144 mmol/L (137-145)   03/14/19  06:50    


 


Potassium  4.0 mmol/L (3.6-5.0)   03/14/19  06:50    


 


Chloride  101.5 mmol/L ()   03/14/19  06:50    


 


Carbon Dioxide  31 mmol/L (22-30)  H  03/14/19  06:50    


 


Anion Gap  16 mmol/L  03/14/19  06:50    


 


BUN  11 mg/dL (7-17)   03/14/19  06:50    


 


Creatinine  0.8 mg/dL (0.7-1.2)   03/14/19  06:50    


 


Estimated GFR  > 60 ml/min  03/14/19  06:50    


 


BUN/Creatinine Ratio  14 %  03/14/19  06:50    


 


Glucose  115 mg/dL ()  H  03/14/19  06:50    


 


Calcium  8.8 mg/dL (8.4-10.2)   03/14/19  06:50    


 


Magnesium  2.20 mg/dL (1.7-2.3)   03/12/19  07:46    


 


Total Bilirubin  1.10 mg/dL (0.1-1.2)   03/08/19  04:35    


 


AST  19 units/L (5-40)   03/08/19  04:35    


 


ALT  26 units/L (7-56)   03/08/19  04:35    


 


Alkaline Phosphatase  96 units/L ()   03/08/19  04:35    


 


NT-Pro-B Natriuret Pep  70.34 pg/mL (0-900)   03/08/19  04:35    


 


Total Protein  6.0 g/dL (6.3-8.2)  L  03/08/19  04:35    


 


Albumin  3.6 g/dL (3.9-5)  L  03/08/19  04:35    


 


Albumin/Globulin Ratio  1.5 %  03/08/19  04:35    


 


Prealbumin  0.137 g/L (0.200-0.400)  L  03/13/19  07:43    














Nutrition/Malnutrition Assess





- Dietary Evaluation


Nutrition/Malnutrition Findings: 


                                        





Nutrition Notes                                            Start:  03/13/19 

15:30


Freq:                                                      Status: Active       




Protocol:                                                                       




 Document     03/14/19 12:48  TW  (Rec: 03/14/19 13:20  TW  [email or web address])


 Co-Sign      03/14/19 12:48  LP


 Nutrition Notes


     Initial or Follow up                        Reassessment


     Current Diagnosis                           COPD,Hypertension,Heart


                                                 Failure,Stroke


     Other Pertinent Diagnosis                   DVT


     Current Diet                                pureed


     Labs/Tests                                  Reviewed.


     Pertinent Medications                       Reviewed.


     Height                                      5 ft 2 in


     Weight                                      85.7 kg


     Ideal Body Weight (kg)                      50.00


     BMI                                         34.5


     Weight Status                               Overweight


     Subjective/Other Information                Pt stated she is not eating


                                                 any of her meals. RN stated


                                                 she will discuss diet


                                                 advancement with MD. Pt stated


                                                 she is not drinking any of


                                                 her supplement.


     Percent of energy/protein needs met:        0%/0%


     Burn                                        Absent


     Trauma                                      Absent


     #1


      Nutrition Diagnosis                        Inadequate oral intake


      Etiology                                   dislike of pureed food


      As Evidenced by Signs and Symptoms         Meeting 0% of her calorie and


                                                 protein needs


     Is patient on ventilator?                   No


     Is Patient Ambulatory and/or Out of Bed     Yes


     [facility] [     1755.325


      [email or web address]]                               


     Calculation Used for Recommendations        [facility]


     Additional Notes                            Protein needs: (1.0-1.2 g/kg)


                                                 ( g/day)


                                                 Fluid needs: 1ml/kcal


 Nutrition Intervention


     Change Diet Order:                          Advance when medically


                                                 feasible


     Goal #1                                     Diet advancement


     Goal #2                                     Meet at least 75% of calorie


                                                 and protein needs by PO intake


     Follow-Up By:                               03/19/19


     Additional Comments                         f/u: intakes and diet


                                                 advancement

## 2019-03-17 RX ADMIN — FUROSEMIDE SCH MG: 20 TABLET ORAL at 08:05

## 2019-03-17 RX ADMIN — IPRATROPIUM BROMIDE AND ALBUTEROL SULFATE SCH AMPUL: .5; 3 SOLUTION RESPIRATORY (INHALATION) at 20:25

## 2019-03-17 RX ADMIN — ALBUTEROL SULFATE PRN MG: 2.5 SOLUTION RESPIRATORY (INHALATION) at 13:00

## 2019-03-17 RX ADMIN — ENOXAPARIN SODIUM SCH MG: 100 INJECTION SUBCUTANEOUS at 08:05

## 2019-03-17 RX ADMIN — LISINOPRIL SCH MG: 40 TABLET ORAL at 13:35

## 2019-03-17 RX ADMIN — ALBUTEROL SULFATE PRN MG: 2.5 SOLUTION RESPIRATORY (INHALATION) at 02:06

## 2019-03-17 RX ADMIN — IPRATROPIUM BROMIDE AND ALBUTEROL SULFATE SCH: .5; 3 SOLUTION RESPIRATORY (INHALATION) at 20:25

## 2019-03-17 RX ADMIN — HYDROCODONE BITARTRATE AND ACETAMINOPHEN PRN EACH: 5; 325 TABLET ORAL at 20:47

## 2019-03-17 RX ADMIN — ATENOLOL SCH MG: 50 TABLET ORAL at 08:05

## 2019-03-17 RX ADMIN — ASPIRIN SCH MG: 325 TABLET ORAL at 08:05

## 2019-03-17 RX ADMIN — HYDROCODONE BITARTRATE AND ACETAMINOPHEN PRN EACH: 5; 325 TABLET ORAL at 13:33

## 2019-03-17 RX ADMIN — IPRATROPIUM BROMIDE AND ALBUTEROL SULFATE SCH AMPUL: .5; 3 SOLUTION RESPIRATORY (INHALATION) at 07:58

## 2019-03-17 NOTE — PROGRESS NOTE
Assessment and Plan


Assessment and plan: 


Acute ischemic stroke, recent.


Admitted to Rehab


Dr. Healy, attending, managing





Hypertension


BP increasing. Will increase atenolol to 100mg po daily





Hyperlipidemia


On statin





Acute respiratory failure due to COPD exacerbation


Supplemental Oxygen





COPD exacerbation


Continue solu-medrol


cont Duoneb Q6


cont Albuterol q 4 prn





Chronic CHF


Continiue Lasix, Atenolol





Thrombocytopenia.


Will monitor





Full code status





Thanks for consulting us. Will continue to follow








History


Interval history: 


Less shortness of breath


Still dysarthria











Hospitalist Physical





- Physical exam


Narrative exam: 


GEN: Not in acute distress, lying in bed


HEENT: Normocephalic, atraumatic, 


Neck: supple, No JVD


Lungs: Bilateral rhonchi, no crackles, no wheeze 


Abd:soft, non tender, non distended, normal bowel sounds


Ext: No edema, no clubbing, no cyanosis


Neuro:Awake,alert,oriented X 3,right sided weakness, Dysarthria,











- Constitutional


Vitals: 


                                        











Temp Pulse Resp BP Pulse Ox


 


 98.8 F   88   16   152/70   97 


 


 03/17/19 11:00  03/17/19 13:20  03/17/19 13:20  03/17/19 13:35  03/17/19 11:00














Results





- Labs


CBC & Chem 7: 


                                 03/14/19 06:50





                                 03/14/19 06:50


Labs: 


                             Laboratory Last Values











WBC  3.9 K/mm3 (4.5-11.0)  L  03/14/19  06:50    


 


RBC  4.23 M/mm3 (3.65-5.03)   03/14/19  06:50    


 


Hgb  12.4 gm/dl (10.1-14.3)   03/14/19  06:50    


 


Hct  37.0 % (30.3-42.9)   03/14/19  06:50    


 


MCV  87 fl (79-97)   03/14/19  06:50    


 


MCH  29 pg (28-32)   03/14/19  06:50    


 


MCHC  34 % (30-34)   03/14/19  06:50    


 


RDW  14.9 % (13.2-15.2)   03/14/19  06:50    


 


Plt Count  113 K/mm3 (140-440)  L  03/14/19  06:50    


 


Lymph % (Auto)  6.6 % (13.4-35.0)  L  03/14/19  06:50    


 


Mono % (Auto)  9.3 % (0.0-7.3)  H  03/14/19  06:50    


 


Eos % (Auto)  2.8 % (0.0-4.3)   03/14/19  06:50    


 


Baso % (Auto)  0.3 % (0.0-1.8)   03/14/19  06:50    


 


Lymph #  0.3 K/mm3 (1.2-5.4)  L  03/14/19  06:50    


 


Mono #  0.4 K/mm3 (0.0-0.8)   03/14/19  06:50    


 


Eos #  0.1 K/mm3 (0.0-0.4)   03/14/19  06:50    


 


Baso #  0.0 K/mm3 (0.0-0.1)   03/14/19  06:50    


 


Seg Neutrophils %  81.0 % (40.0-70.0)  H  03/14/19  06:50    


 


Seg Neutrophils #  3.2 K/mm3 (1.8-7.7)   03/14/19  06:50    


 


Sodium  144 mmol/L (137-145)   03/14/19  06:50    


 


Potassium  4.0 mmol/L (3.6-5.0)   03/14/19  06:50    


 


Chloride  101.5 mmol/L ()   03/14/19  06:50    


 


Carbon Dioxide  31 mmol/L (22-30)  H  03/14/19  06:50    


 


Anion Gap  16 mmol/L  03/14/19  06:50    


 


BUN  11 mg/dL (7-17)   03/14/19  06:50    


 


Creatinine  0.8 mg/dL (0.7-1.2)   03/14/19  06:50    


 


Estimated GFR  > 60 ml/min  03/14/19  06:50    


 


BUN/Creatinine Ratio  14 %  03/14/19  06:50    


 


Glucose  115 mg/dL ()  H  03/14/19  06:50    


 


Calcium  8.8 mg/dL (8.4-10.2)   03/14/19  06:50    


 


Magnesium  2.20 mg/dL (1.7-2.3)   03/12/19  07:46    


 


Total Bilirubin  1.10 mg/dL (0.1-1.2)   03/08/19  04:35    


 


AST  19 units/L (5-40)   03/08/19  04:35    


 


ALT  26 units/L (7-56)   03/08/19  04:35    


 


Alkaline Phosphatase  96 units/L ()   03/08/19  04:35    


 


NT-Pro-B Natriuret Pep  70.34 pg/mL (0-900)   03/08/19  04:35    


 


Total Protein  6.0 g/dL (6.3-8.2)  L  03/08/19  04:35    


 


Albumin  3.6 g/dL (3.9-5)  L  03/08/19  04:35    


 


Albumin/Globulin Ratio  1.5 %  03/08/19  04:35    


 


Prealbumin  0.137 g/L (0.200-0.400)  L  03/13/19  07:43    














Nutrition/Malnutrition Assess





- Dietary Evaluation


Nutrition/Malnutrition Findings: 


                                        





Nutrition Notes                                            Start:  03/13/19 15

:30


Freq:                                                      Status: Active       




Protocol:                                                                       




 Document     03/14/19 12:48  TW  (Rec: 03/14/19 13:20  TW  [email or web address])


 Co-Sign      03/14/19 12:48  LP


 Nutrition Notes


     Initial or Follow up                        Reassessment


     Current Diagnosis                           COPD,Hypertension,Heart


                                                 Failure,Stroke


     Other Pertinent Diagnosis                   DVT


     Current Diet                                pureed


     Labs/Tests                                  Reviewed.


     Pertinent Medications                       Reviewed.


     Height                                      5 ft 2 in


     Weight                                      85.7 kg


     Ideal Body Weight (kg)                      50.00


     BMI                                         34.5


     Weight Status                               Overweight


     Subjective/Other Information                Pt stated she is not eating


                                                 any of her meals. RN stated


                                                 she will discuss diet


                                                 advancement with MD. Pt stated


                                                 she is not drinking any of


                                                 her supplement.


     Percent of energy/protein needs met:        0%/0%


     Burn                                        Absent


     Trauma                                      Absent


     #1


      Nutrition Diagnosis                        Inadequate oral intake


      Etiology                                   dislike of pureed food


      As Evidenced by Signs and Symptoms         Meeting 0% of her calorie and


                                                 protein needs


     Is patient on ventilator?                   No


     Is Patient Ambulatory and/or Out of Bed     Yes


     REE-([facility]-ambulatory/OOB) [     1755.325


      [email or web address]]                               


     Calculation Used for Recommendations        [facility]


     Additional Notes                            Protein needs: (1.0-1.2 g/kg)


                                                 ( g/day)


                                                 Fluid needs: 1ml/kcal


 Nutrition Intervention


     Change Diet Order:                          Advance when medically


                                                 feasible


     Goal #1                                     Diet advancement


     Goal #2                                     Meet at least 75% of calorie


                                                 and protein needs by PO intake


     Follow-Up By:                               03/19/19


     Additional Comments                         f/u: intakes and diet


                                                 advancement

## 2019-03-18 RX ADMIN — ASPIRIN SCH MG: 325 TABLET ORAL at 07:55

## 2019-03-18 RX ADMIN — FUROSEMIDE SCH MG: 20 TABLET ORAL at 07:54

## 2019-03-18 RX ADMIN — ALBUTEROL SULFATE PRN MG: 2.5 SOLUTION RESPIRATORY (INHALATION) at 04:20

## 2019-03-18 RX ADMIN — HYDROCODONE BITARTRATE AND ACETAMINOPHEN PRN EACH: 5; 325 TABLET ORAL at 04:02

## 2019-03-18 RX ADMIN — IPRATROPIUM BROMIDE AND ALBUTEROL SULFATE SCH AMPUL: .5; 3 SOLUTION RESPIRATORY (INHALATION) at 20:44

## 2019-03-18 RX ADMIN — IPRATROPIUM BROMIDE AND ALBUTEROL SULFATE SCH AMPUL: .5; 3 SOLUTION RESPIRATORY (INHALATION) at 08:25

## 2019-03-18 RX ADMIN — LISINOPRIL SCH MG: 40 TABLET ORAL at 07:55

## 2019-03-18 RX ADMIN — IPRATROPIUM BROMIDE AND ALBUTEROL SULFATE SCH AMPUL: .5; 3 SOLUTION RESPIRATORY (INHALATION) at 15:49

## 2019-03-18 RX ADMIN — ENOXAPARIN SODIUM SCH MG: 100 INJECTION SUBCUTANEOUS at 07:56

## 2019-03-18 RX ADMIN — ATENOLOL SCH MG: 50 TABLET ORAL at 13:03

## 2019-03-18 NOTE — PROGRESS NOTE
Assessment and Plan


Assessment and plan: 


Patient 68-year-old with recent acute ischemic stroke.  She has been admitted to

the rehabilitation unit for rehabilitation.  Hospitalist service consulted for 

medical management. she has hypertension, hyperlipidemia, COPD chronic CHF.  She

complained of shortness of breath few days ago diagnosed with COPD exacerbation,

and was started on Solu-Medrol, oxygen.  She feels better today, will decrease 

dose of Solu-Medrol down to 40 mg every 12h.








Acute ischemic stroke, recent.


Admitted to Rehab


Dr. Healy, attending, managing





Hypertension


Continue atenolol 





Hyperlipidemia


On statin





Acute respiratory failure due to COPD exacerbation


Supplemental Oxygen





COPD exacerbation


Decrease solu-medrol to 40mgh q 12hl


cont Duoneb Q6


cont Albuterol q 4 prn





Chronic CHF


Continiue Lasix, Atenolol





Thrombocytopenia.


Will monitor





Full code status





Thanks for consulting us. Will continue to follow








History


Interval history: 


Patient in rehab, post stroke, now with COPD exacerbation


Less shortness of breath


Still dysarthria











Hospitalist Physical





- Physical exam


Narrative exam: 


GEN: Not in acute distress, lying in bed,Oxygen by NC on


HEENT: Normocephalic, atraumatic, 


Neck: supple, No JVD


heart:S1 and s2 reg, no murmurs, rubs or gallop


Lungs: Few bilateral rhonchi, no crackles,  


Abd:soft, non tender, non distended, normal bowel sounds


Ext: No edema, no clubbing, no cyanosis


Neuro:Awake,alert,oriented X 3,right sided weakness, Dysarthria,











- Constitutional


Vitals: 


                                        











Temp Pulse Resp BP Pulse Ox


 


 97.5 F L  96 H  16   122/62   95 


 


 03/18/19 07:49  03/18/19 15:53  03/18/19 15:53  03/18/19 13:03  03/18/19 08:27














Results





- Labs


CBC & Chem 7: 


                                 03/14/19 06:50





                                 03/14/19 06:50


Labs: 


                             Laboratory Last Values











WBC  3.9 K/mm3 (4.5-11.0)  L  03/14/19  06:50    


 


RBC  4.23 M/mm3 (3.65-5.03)   03/14/19  06:50    


 


Hgb  12.4 gm/dl (10.1-14.3)   03/14/19  06:50    


 


Hct  37.0 % (30.3-42.9)   03/14/19  06:50    


 


MCV  87 fl (79-97)   03/14/19  06:50    


 


MCH  29 pg (28-32)   03/14/19  06:50    


 


MCHC  34 % (30-34)   03/14/19  06:50    


 


RDW  14.9 % (13.2-15.2)   03/14/19  06:50    


 


Plt Count  113 K/mm3 (140-440)  L  03/14/19  06:50    


 


Lymph % (Auto)  6.6 % (13.4-35.0)  L  03/14/19  06:50    


 


Mono % (Auto)  9.3 % (0.0-7.3)  H  03/14/19  06:50    


 


Eos % (Auto)  2.8 % (0.0-4.3)   03/14/19  06:50    


 


Baso % (Auto)  0.3 % (0.0-1.8)   03/14/19  06:50    


 


Lymph #  0.3 K/mm3 (1.2-5.4)  L  03/14/19  06:50    


 


Mono #  0.4 K/mm3 (0.0-0.8)   03/14/19  06:50    


 


Eos #  0.1 K/mm3 (0.0-0.4)   03/14/19  06:50    


 


Baso #  0.0 K/mm3 (0.0-0.1)   03/14/19  06:50    


 


Seg Neutrophils %  81.0 % (40.0-70.0)  H  03/14/19  06:50    


 


Seg Neutrophils #  3.2 K/mm3 (1.8-7.7)   03/14/19  06:50    


 


Sodium  144 mmol/L (137-145)   03/14/19  06:50    


 


Potassium  4.0 mmol/L (3.6-5.0)   03/14/19  06:50    


 


Chloride  101.5 mmol/L ()   03/14/19  06:50    


 


Carbon Dioxide  31 mmol/L (22-30)  H  03/14/19  06:50    


 


Anion Gap  16 mmol/L  03/14/19  06:50    


 


BUN  11 mg/dL (7-17)   03/14/19  06:50    


 


Creatinine  0.8 mg/dL (0.7-1.2)   03/14/19  06:50    


 


Estimated GFR  > 60 ml/min  03/14/19  06:50    


 


BUN/Creatinine Ratio  14 %  03/14/19  06:50    


 


Glucose  115 mg/dL ()  H  03/14/19  06:50    


 


Calcium  8.8 mg/dL (8.4-10.2)   03/14/19  06:50    


 


Magnesium  2.20 mg/dL (1.7-2.3)   03/12/19  07:46    


 


Total Bilirubin  1.10 mg/dL (0.1-1.2)   03/08/19  04:35    


 


AST  19 units/L (5-40)   03/08/19  04:35    


 


ALT  26 units/L (7-56)   03/08/19  04:35    


 


Alkaline Phosphatase  96 units/L ()   03/08/19  04:35    


 


NT-Pro-B Natriuret Pep  70.34 pg/mL (0-900)   03/08/19  04:35    


 


Total Protein  6.0 g/dL (6.3-8.2)  L  03/08/19  04:35    


 


Albumin  3.6 g/dL (3.9-5)  L  03/08/19  04:35    


 


Albumin/Globulin Ratio  1.5 %  03/08/19  04:35    


 


Prealbumin  0.137 g/L (0.200-0.400)  L  03/13/19  07:43    














Active Medications





- Current Medications


Current Medications: 














Generic Name Dose Route Start Last Admin





  Trade Name Freq  PRN Reason Stop Dose Admin


 


Acetaminophen  650 mg  03/07/19 16:26 





  Tylenol  PO  





  Q4H PRN  





  Pain MILD(1-3)/Fever >100.5/HA  


 


Acetaminophen/Hydrocodone Bitart  1 each  03/07/19 16:46  03/18/19 04:02





  [place] 5/325  PO   1 each





  Q4H PRN   Administration





  Pain, Moderate (4-6)  


 


Albuterol  2.5 mg  03/07/19 16:46  03/18/19 04:20





  Proventil  IH   2.5 mg





  Q4HRT PRN   Administration





  Shortness Of Breath  


 


Albuterol/Ipratropium  1 ampul  03/16/19 14:00  03/18/19 15:49





  Duoneb *Not For Prn Use*  IH   1 ampul





  TIDRT FRANCIS   Administration


 


Aspirin  325 mg  03/08/19 08:00  03/18/19 07:55





  Aspirin  PO   325 mg





  QDAY FRANCIS   Administration


 


Atenolol  100 mg  03/15/19 08:00  03/18/19 13:03





  Tenormin  PO   100 mg





  QDAY FRANCIS   Administration


 


Atorvastatin Calcium  40 mg  03/07/19 21:00  03/17/19 21:13





  Lipitor  PO   40 mg





  QHS FRANCIS   Administration


 


Bisacodyl  10 mg  03/07/19 18:20 





  Dulcolax  ID  





  QDAY PRN  





  Constipation unrelieved by MOM  


 


Enoxaparin Sodium  40 mg  03/08/19 08:00  03/18/19 07:56





  Lovenox  SUB-Q   40 mg





  QDAY FRANCIS   Administration


 


Furosemide  20 mg  03/09/19 05:00  03/18/19 07:54





  Lasix  PO   20 mg





  QDAY FRANCIS   Administration


 


Lisinopril  40 mg  03/17/19 13:00  03/18/19 07:55





  Zestril  PO   40 mg





  QDAY FRANCIS   Administration


 


Magnesium Hydroxide  30 ml  03/07/19 16:26  03/13/19 08:21





  Milk Of Magnesia  PO   30 ml





  Q4H PRN   Administration





  Constipation  


 


Ondansetron HCl  4 mg  03/15/19 12:05  03/15/19 13:08





  Zofran Odt  PO   4 mg





  Q6H PRN   Administration





  Nausea And Vomiting  














Nutrition/Malnutrition Assess





- Dietary Evaluation


Nutrition/Malnutrition Findings: 


                                        





Nutrition Notes                                            Start:  03/13/19 

15:30


Freq:                                                      Status: Active       




Protocol:                                                                       




 Document     03/14/19 12:48  TW  (Rec: 03/14/19 13:20  TW  [id number])


 Co-Sign      03/14/19 12:48  LP


 Nutrition Notes


     Initial or Follow up                        Reassessment


     Current Diagnosis                           COPD,Hypertension,Heart


                                                 Failure,Stroke


     Other Pertinent Diagnosis                   DVT


     Current Diet                                pureed


     Labs/Tests                                  Reviewed.


     Pertinent Medications                       Reviewed.


     Height                                      5 ft 2 in


     Weight                                      85.7 kg


     Ideal Body Weight (kg)                      50.00


     BMI                                         34.5


     Weight Status                               Overweight


     Subjective/Other Information                Pt stated she is not eating


                                                 any of her meals. RN stated


                                                 she will discuss diet


                                                 advancement with MD. Pt stated


                                                 she is not drinking any of


                                                 her supplement.


     Percent of energy/protein needs met:        0%/0%


     Burn                                        Absent


     Trauma                                      Absent


     #1


      Nutrition Diagnosis                        Inadequate oral intake


      Etiology                                   dislike of pureed food


      As Evidenced by Signs and Symptoms         Meeting 0% of her calorie and


                                                 protein needs


     Is patient on ventilator?                   No


     Is Patient Ambulatory and/or Out of Bed     Yes


     REE-([facility]-ambulatory/OOB) [     1755.325


      [email or web address]]                               


     Calculation Used for Recommendations        [facility]


     Additional Notes                            Protein needs: (1.0-1.2 g/kg)


                                                 ( g/day)


                                                 Fluid needs: 1ml/kcal


 Nutrition Intervention


     Change Diet Order:                          Advance when medically


                                                 feasible


     Goal #1                                     Diet advancement


     Goal #2                                     Meet at least 75% of calorie


                                                 and protein needs by PO intake


     Follow-Up By:                               03/19/19


     Additional Comments                         f/u: intakes and diet


                                                 advancement

## 2019-03-19 LAB
BUN SERPL-MCNC: 24 MG/DL (ref 7–17)
BUN/CREAT SERPL: 27 %
CALCIUM SERPL-MCNC: 9.2 MG/DL (ref 8.4–10.2)
HCT VFR BLD CALC: 41.4 % (ref 30.3–42.9)
HEMOLYSIS INDEX: 4
HGB BLD-MCNC: 13.9 GM/DL (ref 10.1–14.3)
MCHC RBC AUTO-ENTMCNC: 34 % (ref 30–34)
MCV RBC AUTO: 87 FL (ref 79–97)
PLATELET # BLD: 221 K/MM3 (ref 140–440)
RBC # BLD AUTO: 4.77 M/MM3 (ref 3.65–5.03)

## 2019-03-19 RX ADMIN — FUROSEMIDE SCH MG: 20 TABLET ORAL at 08:21

## 2019-03-19 RX ADMIN — ATENOLOL SCH MG: 50 TABLET ORAL at 08:21

## 2019-03-19 RX ADMIN — IPRATROPIUM BROMIDE AND ALBUTEROL SULFATE SCH AMPUL: .5; 3 SOLUTION RESPIRATORY (INHALATION) at 20:34

## 2019-03-19 RX ADMIN — HYDROCODONE BITARTRATE AND ACETAMINOPHEN PRN EACH: 5; 325 TABLET ORAL at 02:44

## 2019-03-19 RX ADMIN — IPRATROPIUM BROMIDE AND ALBUTEROL SULFATE SCH AMPUL: .5; 3 SOLUTION RESPIRATORY (INHALATION) at 08:50

## 2019-03-19 RX ADMIN — IPRATROPIUM BROMIDE AND ALBUTEROL SULFATE SCH AMPUL: .5; 3 SOLUTION RESPIRATORY (INHALATION) at 14:39

## 2019-03-19 RX ADMIN — ASPIRIN SCH MG: 325 TABLET ORAL at 08:21

## 2019-03-19 RX ADMIN — ALBUTEROL SULFATE PRN MG: 2.5 SOLUTION RESPIRATORY (INHALATION) at 02:19

## 2019-03-19 RX ADMIN — LISINOPRIL SCH MG: 40 TABLET ORAL at 08:22

## 2019-03-19 RX ADMIN — ENOXAPARIN SODIUM SCH MG: 100 INJECTION SUBCUTANEOUS at 08:22

## 2019-03-19 NOTE — PROGRESS NOTE
Assessment and Plan





Acute ischemic stroke, recent.


Admitted to Rehab


Dr. Healy, attending, managing





Hypertension


Continue atenolol 





Hyperlipidemia


On statin





Acute respiratory failure due to COPD exacerbation


Supplemental Oxygen





COPD exacerbation


Decrease solu-medrol to 40mgh q 12hl


cont Duoneb Q6


cont Albuterol q 4 prn





Chronic CHF


Continiue Lasix, Atenolol





Thrombocytopenia.


Will monitor





Full code status








Subjective


Date of service: 03/19/19


Principal diagnosis: Left CVA


Interval history: 


Improving








Objective





- Constitutional


Vitals: 


                               Vital Signs - 12hr











  03/19/19 03/19/19 03/19/19





  07:52 08:00 08:55


 


Temperature 98.0 F  


 


Pulse Rate 78  


 


Pulse Rate [  84 





Bilateral   





Throughout]   


 


Pulse Rate [  88 





Throughout]   


 


Respiratory 21  





Rate   


 


Respiratory  24 





Rate [Bilateral   





Throughout]   


 


Respiratory  20 





Rate [   





Throughout]   


 


Blood Pressure 115/55  


 


Blood Pressure   





[Right]   


 


O2 Sat by Pulse 98 94 94





Oximetry   














  03/19/19





  14:00


 


Temperature 98.2 F


 


Pulse Rate 86


 


Pulse Rate [ 





Bilateral 





Throughout] 


 


Pulse Rate [ 





Throughout] 


 


Respiratory 21





Rate 


 


Respiratory 





Rate [Bilateral 





Throughout] 


 


Respiratory 





Rate [ 





Throughout] 


 


Blood Pressure 


 


Blood Pressure 120/60





[Right] 


 


O2 Sat by Pulse 97





Oximetry 











General appearance: Present: no acute distress, well-nourished





- EENT


Eyes: PERRL, EOM intact


ENT: hearing intact, clear oral mucosa


Ears: bilateral: normal





- Neck


Neck: supple, normal ROM





- Respiratory


Respiratory effort: normal


Respiratory: bilateral: CTA





- Breasts


Breasts: normal





- Cardiovascular


Rhythm: regular


Heart Sounds: Present: S1 & S2.  Absent: gallop, rub


Extremities: pulses intact, No edema, normal color, Full ROM





- Gastrointestinal


General gastrointestinal: Present: soft, non-tender, non-distended, normal bowel

 sounds





- Genitourinary


Female genitourinary: normal





- Integumentary


Integumentary: clear, warm, dry





- Musculoskeletal


Musculoskeletal: right sided weakness





- Neurologic


Neurologic: focal deficits





- Psychiatric


Psychiatric: memory intact, appropriate mood/affect, intact judgment & insight





- Allied health notes


Allied health notes reviewed: nursing, case management





- Labs


CBC & Chem 7: 


                                 03/19/19 05:52





                                 03/19/19 05:52


Labs: 


                              Abnormal lab results











  03/19/19 03/19/19 Range/Units





  05:52 05:52 


 


WBC  12.1 H   (4.5-11.0)  K/mm3


 


RDW  15.3 H   (13.2-15.2)  %


 


Chloride   96.5 L  ()  mmol/L


 


BUN   24 H  (7-17)  mg/dL


 


Glucose   167 H  ()  mg/dL

## 2019-03-20 RX ADMIN — IPRATROPIUM BROMIDE AND ALBUTEROL SULFATE SCH AMPUL: .5; 3 SOLUTION RESPIRATORY (INHALATION) at 20:08

## 2019-03-20 RX ADMIN — ALBUTEROL SULFATE PRN MG: 2.5 SOLUTION RESPIRATORY (INHALATION) at 03:53

## 2019-03-20 RX ADMIN — FUROSEMIDE SCH MG: 20 TABLET ORAL at 08:13

## 2019-03-20 RX ADMIN — ENOXAPARIN SODIUM SCH MG: 100 INJECTION SUBCUTANEOUS at 08:13

## 2019-03-20 RX ADMIN — IPRATROPIUM BROMIDE AND ALBUTEROL SULFATE SCH AMPUL: .5; 3 SOLUTION RESPIRATORY (INHALATION) at 14:35

## 2019-03-20 RX ADMIN — ASPIRIN SCH MG: 325 TABLET ORAL at 08:12

## 2019-03-20 RX ADMIN — HYDROCODONE BITARTRATE AND ACETAMINOPHEN PRN EACH: 5; 325 TABLET ORAL at 18:43

## 2019-03-20 RX ADMIN — ATENOLOL SCH MG: 50 TABLET ORAL at 08:12

## 2019-03-20 RX ADMIN — LISINOPRIL SCH MG: 40 TABLET ORAL at 08:13

## 2019-03-20 RX ADMIN — HYDROCODONE BITARTRATE AND ACETAMINOPHEN PRN EACH: 5; 325 TABLET ORAL at 00:12

## 2019-03-20 RX ADMIN — IPRATROPIUM BROMIDE AND ALBUTEROL SULFATE SCH AMPUL: .5; 3 SOLUTION RESPIRATORY (INHALATION) at 07:45

## 2019-03-20 NOTE — PROGRESS NOTE
Subjective


Date of service: 03/20/19


Principal diagnosis: Left CVA


Interval history: 





no c/o's noted today, excited about going home tomorrow....





Objective





- Constitutional


Vitals: 


                               Vital Signs - 12hr











  03/20/19 03/20/19 03/20/19





  03:55 04:21 07:29


 


Temperature   97.5 F L


 


Pulse Rate   69


 


Pulse Rate [ 69 72 





Throughout]   


 


Respiratory   21





Rate   


 


Respiratory 18 18 





Rate [   





Throughout]   


 


Blood Pressure   139/69


 


O2 Sat by Pulse   96





Oximetry   











General appearance: 2 (Y), 13 (Y)





- EENT


Eyes: PERRL, EOM intact


ENT: 2 (Y), 1 (Y)





- Neck


Neck: supple, normal ROM





- Respiratory


Respiratory effort: 


Respiratory: bilateral: CTA





- Breasts


Breasts: 





- Cardiovascular


Rhythm: regular


Heart Sounds: Present: S1 & S2.  Absent: gallop, rub


Extremities: pulses intact, No edema, normal color, Full ROM





- Gastrointestinal


General gastrointestinal: soft, non-tender, non-distended, normal bowel sounds





- Genitourinary


Female genitourinary: 





- Integumentary


Integumentary: Present: clear, warm, dry





- Musculoskeletal


Musculoskeletal: Present: 1, strength equal bilaterally





- Neurologic


Neurologic: moves all extremities





- Psychiatric


Psychiatric: memory intact, appropriate mood/affect, intact judgment & insight





- Allied health notes


FIMS assessment as documented by PT/OT/ST: 


Grooming





Patient cleans teeth/dentures:   Yes


Patient chapman/brushes hair:      Yes


Patient washes, rinses and       Yes


dries face:                      


Patient washes, rinses and       Yes


dries hands:                     


Patient shaves:                  No


Patient applies make-up:         No


Patient performs (no make-up/    3/4 (75%)


shaving):                        


Grooming FIM Score               5. Supervision ([place] applies toothpaste or


                                 opens containers.)





Toileting





Toileting Device                 Bedside Commode,Commode over Toilet


Patient able to:                 Adjust clothes before,Clean self,Adjust clothes


                                 after


Patient able to perform:         3/3 (100%)


Toileting FIM Score              6. Modified [place] (Needs equip. or 

prosth


                                 ./orth.)





Social interaction/Memory/Problem solving





Social Interaction FIM Score     5. Supervision (Needs supv. <10%. Needs


                                 encouragement to participate.)


Memory FIM Score                 5. Supervision (Needs cueing <10%, stressful/


                                 unfamiliar situations.)


Problem Solving FIM Score        5. Supervision (Needs cueing <10% to solve


                                 routine problems.)





Transfers





Mode of Locomotion:              Walking


Bed/Chair/Wheelchair Transfers   4. Minimal Assistance (Patient = 75% or more.


FIM Score                        Needs touching.)


Toilet Transfers FIM Score       4. Minimal Assistance (Patient = 75% or more.


                                 Needs touching.)


Patient transferred to:          Shower


Shower Transfers FIM Score       4. Minimal Assistance (Patient = 75% or more.


                                 Needs touching.)





Locomotion- Stairs





Device used on Stairs            Handrail/s


Number of Stairs Ascended/       12


Descended                        


Patient used handrail/support:   Yes


Stairs FIM Score                 5. Supervision (12-14 stairs w/ supv. 4-6 

stairs


                                 independently.)





Locomotion- walk/wheelchair





Most Frequent Mode of            Walking


Locomotion:                      


Ambulation Distance              357


Walking FIM Score                5. Supervision (Minimum 150 ft. supv./cues or 

50


                                 ft. independently.)


Wheelchair Propulsion Distance   600


Wheelchair FIM Score             6. Modified [place] (Wheels a minimum of


                                 150 ft.)





Eating





Eating FIM Score                 6. Modified [place] (Special consistency 

or


                                 uses device.)





Dressing-Upper body





Patient retrieves clothing       No


items:                           


Patient applies/removes UE       n/a


prosthesis or orthosis:          


Upper Body Dressing FIM Score    4. Minimal Assistance (Patient = 75% or more.


                                 Needs touching.)





Dressing-lower body





Lower Body Dressing Device       Reacher/Stick


Patient retrieves clothing       No


items:                           


Patient applies/removes LE       n/a


prosthesis or orthosis:          


Lower Body Dressing FIM Score    5. Supv./Set-Up ([place] sets out clothes or


                                 applies pros./orth.)











- Labs


CBC & Chem 7: 


                                 03/19/19 05:52





                                 03/19/19 05:52


Labs: 


                         Laboratory Results - last 72 hr











  03/19/19 03/19/19





  05:52 05:52


 


WBC  12.1 H 


 


RBC  4.77 


 


Hgb  13.9 


 


Hct  41.4 


 


MCV  87 


 


MCH  29 


 


MCHC  34 


 


RDW  15.3 H 


 


Plt Count  221 


 


Sodium   140


 


Potassium   4.6


 


Chloride   96.5 L


 


Carbon Dioxide   30


 


Anion Gap   18


 


BUN   24 H


 


Creatinine   0.9


 


Estimated GFR   > 60


 


BUN/Creatinine Ratio   27


 


Glucose   167 H


 


Calcium   9.2














Assessment and Plan





s/p CVA doing well


continue rehab and observation


hopefully discharge to home in am

## 2019-03-21 VITALS — DIASTOLIC BLOOD PRESSURE: 83 MMHG | SYSTOLIC BLOOD PRESSURE: 151 MMHG

## 2019-03-21 RX ADMIN — ASPIRIN SCH MG: 325 TABLET ORAL at 09:50

## 2019-03-21 RX ADMIN — HYDROCODONE BITARTRATE AND ACETAMINOPHEN PRN EACH: 5; 325 TABLET ORAL at 02:31

## 2019-03-21 RX ADMIN — ENOXAPARIN SODIUM SCH MG: 100 INJECTION SUBCUTANEOUS at 09:50

## 2019-03-21 RX ADMIN — LISINOPRIL SCH MG: 40 TABLET ORAL at 09:50

## 2019-03-21 RX ADMIN — ATENOLOL SCH MG: 50 TABLET ORAL at 09:50

## 2019-03-21 RX ADMIN — IPRATROPIUM BROMIDE AND ALBUTEROL SULFATE SCH AMPUL: .5; 3 SOLUTION RESPIRATORY (INHALATION) at 14:39

## 2019-03-21 RX ADMIN — IPRATROPIUM BROMIDE AND ALBUTEROL SULFATE SCH AMPUL: .5; 3 SOLUTION RESPIRATORY (INHALATION) at 08:27

## 2019-03-21 RX ADMIN — FUROSEMIDE SCH MG: 20 TABLET ORAL at 09:51

## 2019-03-21 RX ADMIN — ALBUTEROL SULFATE PRN MG: 2.5 SOLUTION RESPIRATORY (INHALATION) at 02:36

## 2019-03-21 NOTE — PROGRESS NOTE
Assessment and Plan





Acute ischemic stroke, recent.


Improving





Hypertension


Continue atenolol 





Hyperlipidemia


On statin





Acute respiratory failure due to COPD exacerbation


Supplemental Oxygen





COPD exacerbation


Decrease solu-medrol to 40mgh q 12hl


cont Duoneb Q6


cont Albuterol q 4 prn





Chronic CHF


Continiue Lasix, Atenolol





Thrombocytopenia.


Improved---Normal platelet level





Full code status








Subjective


Date of service: 03/21/19


Principal diagnosis: Left CVA


Interval history: 


Improving








Objective





- Constitutional


Vitals: 


                               Vital Signs - 12hr











  03/21/19 03/21/19 03/21/19





  04:26 07:56 07:57


 


Temperature 97.3 F L 98.4 F 


 


Pulse Rate 85 83 83


 


Pulse Rate [   





Throughout]   


 


Respiratory 20 20 





Rate   


 


Respiratory   





Rate [   





Throughout]   


 


Blood Pressure 166/79 151/83 


 


O2 Sat by Pulse 95 94 94





Oximetry   














  03/21/19 03/21/19 03/21/19





  08:27 08:37 08:53


 


Temperature   


 


Pulse Rate   


 


Pulse Rate [ 93 H 98 H 





Throughout]   


 


Respiratory   





Rate   


 


Respiratory 20 20 





Rate [   





Throughout]   


 


Blood Pressure   


 


O2 Sat by Pulse   94





Oximetry   











General appearance: Present: no acute distress, well-nourished





- EENT


Eyes: PERRL, EOM intact


ENT: hearing intact, clear oral mucosa


Ears: bilateral: normal





- Neck


Neck: supple, normal ROM





- Respiratory


Respiratory effort: normal


Respiratory: bilateral: CTA





- Breasts


Breasts: normal





- Cardiovascular


Heart rate: 78


Rhythm: regular


Heart Sounds: Present: S1 & S2.  Absent: gallop, rub


Extremities: no ischemia, pulses intact, No edema, normal color, Full ROM





- Gastrointestinal


General gastrointestinal: Present: soft, non-tender, non-distended, normal bowel

sounds





- Genitourinary


Female genitourinary: normal





- Integumentary


Integumentary: clear, warm, dry





- Musculoskeletal


Musculoskeletal: right sided weakness





- Neurologic


Neurologic: moves all extremities





- Psychiatric


Psychiatric: memory intact, appropriate mood/affect, intact judgment & insight





- Labs


CBC & Chem 7: 


                                 03/19/19 05:52





                                 03/19/19 05:52

## 2019-04-08 NOTE — DISCHARGE SUMMARY
Providers





- Providers


Date of Admission: 


03/07/19 18:09





Date of discharge: 03/21/19


Attending physician: 


TIGRE CHIN III, MD





                                        





03/07/19 16:20


Occupational Therapy Evaluate and Treat [CONS] Routine 


   Comment: 


   Reason For Exam: ADL dysfunction


Physical Therapy Evaluation and Treat [CONS] Routine 


   Comment: 


   Reason For Exam: Mobility Dysfunction





03/07/19 16:26


Speech Therapy Evaluation and Treat [CONS] Routine 


   Reason For Exam: Dysphagia/cognition





03/07/19 16:31


Consult to Case Management [CONS] Routine 


   Services Needed at Discharge: Home Health Services


   Notified:: cm notified





03/13/19 08:00


Consult to Physician [CONS] Routine 


   Comment: Ok to see Wednesday


   Consulting Provider: ROYER BETANCUR


   Physician Instructions: 


   Reason For Exam: Mgmnt HTN,CHF,COPD,Electrolytes,thrombocytopenia











Primary care physician: 


[facility], MD








Hospitalization


Reason for admission: CVA


Condition: Fair


Hospital course: 





69 YO Female with Obesity, HTN, COPD, CHF presented to ED for evaluation for 

right sided weakness and decreased responsiveness. Patient unable to provide 

history due to condition, per the son the patient experienced an acute onset of 

right arm and leg weakness, and became unresponsive while at a routine 

appointment for her  at his pulmonologist's office. EMS was notified and 

upon arrival the patient was found to have neurologic deficit resulting in a 

code stroke, found to have Acute CVA and was treated with TPA in ED.  She was 

transferred to the ICU afterwards for monitoring.  MRI brain showed acute left 

basal ganglia/left periventricular white matter infarct and a small acute 

infarct in the left cerebellum. Prior to transfer she developed abdominal pain 

and was evaluated and found to have gallstones.  After she was medically cleared

 she was transferred for further rehab.





She made progress with PT/OT but had issues with dysphagia and dysarthria.  Will

 need to follow up with HH for further therapy, especially SLP.





Patient was discharged home by another physician while I was out of town.


Disposition: [id number]/TX-06 HOME UNDER HOME [facility]


Time spent for discharge: <30min





Core Measure Documentation





- Palliative Care


Palliative Care/ Comfort Measures: Not Applicable





- Core Measures


Any of the following diagnoses?: stroke





- Stroke Discharge Requirements


Statin for LDL = or >70 mg/dl on DC: Yes


Anticoag for atrial fib/atrial flutter: Not Applicable


Antithrombotic for ischemic stroke: Yes





Exam





- Physical Exam


Narrative exam: 


MUSCULOSKELETAL SPECIALTY EXAM








Patient examined and discharged by another physician





My last exam of her was as follows: 








CONSTITUTIONAL:


Well developed, well nourished, appropriately groomed obese. 


RIGHT hand dominant.





RESPIRATORY:


Bilateral wheeze to ascultation, no increased work of breathing, on 2L O2 at 

home 24/7 





CARDIOVASCULAR:  


Regular Rhythm,tachycardic, no swelling, edema or tenderness in BUE or BLE. All 

extremities warm.  





GI: 


+ bowel sounds, soft, NTTP, nondistended.





INTEGUMENTARY:


Normal, no lesion, rash, masses or bruising noted in extremities. 





MUSCULOSKELETAL:


BUE and BLE normal without defect, crepitus, subluxation, effusion, arthritic 

changes or TTP. 





         SA       EF      WE      EE       FF      FA      HF      KE      ADF  

    EHL      APF


R       4/5      4/5     4/5      4/5     4/5     4/5     4/5     4/5     4/5   

    4/5      4/5


L        5/5      5/5     5/5     5/5      5/5     5 /5    5/5     5/5      5/5 

      5/5      5/5





ROM decreased on right but improving, normal on left


Tone normal





NEURO:


CN II - XII grossly intact except: visual field decreased on right, right facial

 droop, decreased shoulder shrug and head rotation on right





Sensation intact in all extremities without extinction. 


No tremor noted in 4 extremities.  


Naming and repetition impaired. Right inattention 


Follows 1 step commands.


Dysphasia and Dysarthria present


Dysphagia 








POSTURE and GAIT:


Sitting posture good. Balance appears reasonable.  Gait with RW fairly steady, 

some LOB and right inattention.





PSYCH:


Alert, oriented x3, affect appears flat. Insight appears intact.











- Constitutional


Vitals: 


                                        











Temp Pulse Resp BP Pulse Ox


 


 36.9 C   76   18   151/83   94 


 


 03/21/19 07:56  03/21/19 14:50  03/21/19 14:50  03/21/19 07:56  03/21/19 08:53














Plan


Activity: advance as tolerated, no driving until cleared by PCP, up only with 

assistance, fall precautions


Diet: other (Pureed/Thin Liq)


Special Instructions: physical therapy, occupational therapy, other (SLP)


Durable Medical Equipment Needed Upon Discharge: Walker-Rolling


Follow up with: 


[facility], [place] [Primary Care Provider] - 7 Days


Prescriptions: 


AtorvaSTATin [Lipitor] 40 mg PO QHS #30 tablet


Aspirin [Aspirin TAB] 325 mg PO QDAY #30 tablet


Ipratropium/Albuterol Sulfate [DUONEB *Not for PRN Use*] 1 ampul IH Q6HRT #50 

ampul.neb


Potassium Chloride [K-Dur] 10 meq PO DAILY #30 tablet


Furosemide [Lasix TAB] 20 mg PO QDAY #30 tablet


Omeprazole 40 mg PO DAILY #30 capsule.


Clopidogrel Bisulfate [Plavix] 75 mg PO QDAY #30 tablet


ALBUTEROL NEB's [Proventil 0.083% NEBS] 2.5 mg IH Q4HRT PRN #1 nebu


 PRN Reason: Shortness Of Breath


Atenolol [Tenormin] 100 mg PO QDAY #30 tablet


Lisinopril [Zestril TAB] 40 mg PO QDAY #30 tablet

## 2023-08-08 NOTE — PROGRESS NOTE
Assessment and Plan


Assessment and plan: 


Patient is a 67 yo woman with a history of Obesity, HTN, COPD, and CHF who 

presented with acute onset of right sided weakness, treated with Alteplase





-Acute Ischemic Stroke with Right hemiparesis and expressive aphasia s/p tPA: 

stroke protocol but hold oral statin due to speech/swallow impairment, use 

rectal ASA unless passes swallow evaluation, Neurology is following


-Accelerated HTN due to above: permissive for now, Neurology is following 


-Chronic Heart failure: strict I/O, daily weight, afterload reduction, 


-COPD (chronic obstructive pulmonary disease): supplemental oxygen, nebulizer 

therapy, chest x ray, pulse oximetry.





Neurology REcommend:


"ICU admission with post Alteplase protocol


Repeat CT head 24 hrs post Alteplase


MRI Brain, CTA head, neck noted, echo pending


PT/OT/ST


VTE prophylaxis


No anticoagulation or antiplatelet therapy until pt is cleared with 24 hr CT 

head


COntinue care for all medical issues as you are doing


POC discussed with pt and fmaily at bedside


Call with questions."





History


Interval history: 


Patient was seen and examined. Follow-up on current diagnosis of CVA. Overnight 

uneventful. Patient denies any chest pain, shortness breath, nausea/vomiting or 

severe headaches. Imaging, nursing note, chart, labs and old chart reviewed. 

Discussed with patient. 





Hospitalist Physical





- Physical exam


Narrative exam: 


Gen: WDWN, NAD, Awake, Alert, appears Orientated 


HEENT: NCAT, EOMI, PERRL, OP Clear 


Neck: supple, no adenopathy, no thyromegaly, no JVD 


CVS/Heart: RRR, normal S1S2, pulses present bilaterally 


Chest/Lungs: CTA B, Symmetrical chest expansion, good air entry bilaterally 


GI/Abdomen: soft, NTND, good bowel sounds, no guarding or rebound 


/Bladder: no suprapubic tenderness, no CVA or paraspinal tenderness 


Extermity/Skin: no c/c/e, no obvious rash 


MSK: FROM x 4, but weaker on right ext


Neuro: CN 2-12 grossly intact except speech, right sided hemiparesis, upper and 

lower


Psych: calm 








- Constitutional


Vitals: 


                                        











Temp Pulse Resp BP Pulse Ox


 


 98.3 F   77   20   166/86   95 


 


 03/01/19 08:07  03/01/19 08:00  03/01/19 08:00  03/01/19 06:03  03/01/19 07:47











General appearance: Present: obese





Results





- Labs


CBC & Chem 7: 


                                 02/28/19 13:20





                                 02/28/19 13:20


Labs: 


                             Laboratory Last Values











WBC  9.1 K/mm3 (4.5-11.0)   02/28/19  13:20    


 


RBC  5.62 M/mm3 (3.65-5.03)  H  02/28/19  13:20    


 


Hgb  16.3 gm/dl (10.1-14.3)  H  02/28/19  13:20    


 


Hct  48.3 % (30.3-42.9)  H  02/28/19  13:20    


 


MCV  86 fl (79-97)   02/28/19  13:20    


 


MCH  29 pg (28-32)   02/28/19  13:20    


 


MCHC  34 % (30-34)   02/28/19  13:20    


 


RDW  14.6 % (13.2-15.2)   02/28/19  13:20    


 


Plt Count  214 K/mm3 (140-440)   02/28/19  13:20    


 


Lymph % (Auto)  6.8 % (13.4-35.0)  L  02/28/19  13:20    


 


Mono % (Auto)  4.4 % (0.0-7.3)   02/28/19  13:20    


 


Eos % (Auto)  0.0 % (0.0-4.3)   02/28/19  13:20    


 


Baso % (Auto)  0.1 % (0.0-1.8)   02/28/19  13:20    


 


Lymph #  0.6 K/mm3 (1.2-5.4)  L  02/28/19  13:20    


 


Mono #  0.4 K/mm3 (0.0-0.8)   02/28/19  13:20    


 


Eos #  0.0 K/mm3 (0.0-0.4)   02/28/19  13:20    


 


Baso #  0.0 K/mm3 (0.0-0.1)   02/28/19  13:20    


 


Seg Neutrophils %  88.7 % (40.0-70.0)  H  02/28/19  13:20    


 


Seg Neutrophils #  8.1 K/mm3 (1.8-7.7)  H  02/28/19  13:20    


 


PT  13.4 Sec. (12.2-14.9)   02/28/19  13:20    


 


INR  0.96  (0.87-1.13)   02/28/19  13:20    


 


APTT  20.1 Sec. (24.2-36.6)  L  02/28/19  13:20    


 


Thrombin Time  19.6 Sec. (15.1-19.6)   02/28/19  13:20    


 


Sodium  139 mmol/L (137-145)   02/28/19  13:20    


 


Potassium  4.4 mmol/L (3.6-5.0)   02/28/19  13:20    


 


Chloride  96.9 mmol/L ()  L  02/28/19  13:20    


 


Carbon Dioxide  30 mmol/L (22-30)   02/28/19  13:20    


 


Anion Gap  17 mmol/L  02/28/19  13:20    


 


BUN  24 mg/dL (7-17)  H  02/28/19  13:20    


 


Creatinine  1.0 mg/dL (0.7-1.2)   02/28/19  13:20    


 


Estimated GFR  55 ml/min  02/28/19  13:20    


 


BUN/Creatinine Ratio  24 %  02/28/19  13:20    


 


Glucose  163 mg/dL ()  H  02/28/19  13:20    


 


Calcium  9.3 mg/dL (8.4-10.2)   02/28/19  13:20    


 


Total Bilirubin  0.60 mg/dL (0.1-1.2)   02/28/19  13:20    


 


AST  16 units/L (5-40)   02/28/19  13:20    


 


ALT  14 units/L (7-56)   02/28/19  13:20    


 


Alkaline Phosphatase  120 units/L ()   02/28/19  13:20    


 


Total Creatine Kinase  69 units/L ()   02/28/19  13:20    


 


CK-MB (CK-2)  1.9 ng/mL (0.0-4.0)   02/28/19  13:20    


 


CK-MB (CK-2) Rel Index  2.7  (0-4)   02/28/19  13:20    


 


Troponin T  < 0.010 ng/mL (0.00-0.029)   02/28/19  13:20    


 


Total Protein  7.0 g/dL (6.3-8.2)   02/28/19  13:20    


 


Albumin  4.4 g/dL (3.9-5)   02/28/19  13:20    


 


Albumin/Globulin Ratio  1.7 %  02/28/19  13:20    


 


Triglycerides  277 mg/dL (2-149)  H  03/01/19  05:30    


 


Cholesterol  193 mg/dL ()   03/01/19  05:30    


 


LDL Cholesterol Direct  137 mg/dL ()  H  03/01/19  05:30    


 


HDL Cholesterol  30 mg/dL (40-59)  L  03/01/19  05:30    


 


Cholesterol/HDL Ratio  6.43 %  03/01/19  05:30    


 


Urine Color  Straw  (Yellow)   02/28/19  15:41    


 


Urine Turbidity  Clear  (Clear)   02/28/19  15:41    


 


Urine pH  7.0  (5.0-7.0)   02/28/19  15:41    


 


Ur Specific Gravity  1.018  (1.003-1.030)   02/28/19  15:41    


 


Urine Protein  <15 mg/dl mg/dL (Negative)   02/28/19  15:41    


 


Urine Glucose (UA)  Neg mg/dL (Negative)   02/28/19  15:41    


 


Urine Ketones  Neg mg/dL (Negative)   02/28/19  15:41    


 


Urine Blood  Neg  (Negative)   02/28/19  15:41    


 


Urine Nitrite  Neg  (Negative)   02/28/19  15:41    


 


Urine Bilirubin  Neg  (Negative)   02/28/19  15:41    


 


Urine Urobilinogen  < 2.0 mg/dL (<2.0)   02/28/19  15:41    


 


Ur Leukocyte Esterase  Neg  (Negative)   02/28/19  15:41    


 


Urine WBC (Auto)  < 1.0 /HPF (0.0-6.0)   02/28/19  15:41    


 


Urine RBC (Auto)  1.0 /HPF (0.0-6.0)   02/28/19  15:41    


 


Urine Mucus  Few /HPF  02/28/19  15:41    


 


Urine Opiates Screen  Presumptive negative   02/28/19  15:41    


 


Urine Methadone Screen  Presumptive negative   02/28/19  15:41    


 


Ur Barbiturates Screen  Presumptive negative   02/28/19  15:41    


 


Ur Phencyclidine Scrn  Presumptive negative   02/28/19  15:41    


 


Ur Amphetamines Screen  Presumptive negative   02/28/19  15:41    


 


U Benzodiazepines Scrn  Presumptive negative   02/28/19  15:41    


 


Urine Cocaine Screen  Presumptive negative   02/28/19  15:41    


 


U Marijuana (THC) Screen  Presumptive negative   02/28/19  15:41    


 


Drugs of Abuse Note  Disclamer   02/28/19  15:41 Never smoker